# Patient Record
Sex: FEMALE | Race: WHITE | Employment: FULL TIME | URBAN - METROPOLITAN AREA
[De-identification: names, ages, dates, MRNs, and addresses within clinical notes are randomized per-mention and may not be internally consistent; named-entity substitution may affect disease eponyms.]

---

## 2017-02-03 ENCOUNTER — HOSPITAL ENCOUNTER (OUTPATIENT)
Dept: MRI IMAGING | Age: 47
Discharge: HOME | End: 2017-02-03
Payer: COMMERCIAL

## 2017-02-03 DIAGNOSIS — G89.4 CHRONIC PAIN SYNDROME: ICD-10-CM

## 2017-02-03 DIAGNOSIS — G47.01 INSOMNIA SECONDARY TO CHRONIC PAIN: ICD-10-CM

## 2017-02-03 DIAGNOSIS — G89.29 CHRONIC PAIN OF LEFT KNEE: ICD-10-CM

## 2017-02-03 DIAGNOSIS — M79.18 MYOFASCIAL PAIN DYSFUNCTION SYNDROME: ICD-10-CM

## 2017-02-03 DIAGNOSIS — M54.12 CERVICAL RADICULOPATHY: ICD-10-CM

## 2017-02-03 DIAGNOSIS — M25.50 POLYARTHRALGIA: ICD-10-CM

## 2017-02-03 DIAGNOSIS — M51.36 DDD (DEGENERATIVE DISC DISEASE), LUMBAR: ICD-10-CM

## 2017-02-03 DIAGNOSIS — Z79.899 ENCOUNTER FOR LONG-TERM (CURRENT) USE OF HIGH-RISK MEDICATION: ICD-10-CM

## 2017-02-03 DIAGNOSIS — M54.2 CERVICALGIA: ICD-10-CM

## 2017-02-03 DIAGNOSIS — M25.562 CHRONIC PAIN OF LEFT KNEE: ICD-10-CM

## 2017-02-03 DIAGNOSIS — M50.30 DDD (DEGENERATIVE DISC DISEASE), CERVICAL: ICD-10-CM

## 2017-02-03 DIAGNOSIS — G89.29 INSOMNIA SECONDARY TO CHRONIC PAIN: ICD-10-CM

## 2017-02-03 PROCEDURE — 73221 MRI JOINT UPR EXTREM W/O DYE: CPT

## 2017-02-10 ENCOUNTER — OFFICE VISIT (OUTPATIENT)
Dept: PAIN MANAGEMENT | Age: 47
End: 2017-02-10

## 2017-02-10 VITALS
BODY MASS INDEX: 34.07 KG/M2 | SYSTOLIC BLOOD PRESSURE: 127 MMHG | HEART RATE: 88 BPM | HEIGHT: 69 IN | WEIGHT: 230 LBS | DIASTOLIC BLOOD PRESSURE: 86 MMHG

## 2017-02-10 DIAGNOSIS — M54.12 CERVICAL RADICULOPATHY: ICD-10-CM

## 2017-02-10 DIAGNOSIS — M79.18 MYOFASCIAL PAIN DYSFUNCTION SYNDROME: ICD-10-CM

## 2017-02-10 DIAGNOSIS — G89.29 CHRONIC RIGHT SHOULDER PAIN: ICD-10-CM

## 2017-02-10 DIAGNOSIS — M25.521 RIGHT ELBOW PAIN: ICD-10-CM

## 2017-02-10 DIAGNOSIS — M15.9 GENERALIZED OA: ICD-10-CM

## 2017-02-10 DIAGNOSIS — G89.29 CHRONIC PAIN OF LEFT KNEE: ICD-10-CM

## 2017-02-10 DIAGNOSIS — M25.511 CHRONIC RIGHT SHOULDER PAIN: ICD-10-CM

## 2017-02-10 DIAGNOSIS — F51.04 CHRONIC INSOMNIA: ICD-10-CM

## 2017-02-10 DIAGNOSIS — M50.30 DDD (DEGENERATIVE DISC DISEASE), CERVICAL: ICD-10-CM

## 2017-02-10 DIAGNOSIS — G47.01 INSOMNIA SECONDARY TO CHRONIC PAIN: ICD-10-CM

## 2017-02-10 DIAGNOSIS — M51.36 DDD (DEGENERATIVE DISC DISEASE), LUMBAR: ICD-10-CM

## 2017-02-10 DIAGNOSIS — M25.562 CHRONIC PAIN OF LEFT KNEE: ICD-10-CM

## 2017-02-10 DIAGNOSIS — G89.29 INSOMNIA SECONDARY TO CHRONIC PAIN: ICD-10-CM

## 2017-02-10 DIAGNOSIS — M54.2 CERVICALGIA: ICD-10-CM

## 2017-02-10 DIAGNOSIS — M76.899 ENTHESOPATHY, KNEE: ICD-10-CM

## 2017-02-10 DIAGNOSIS — G89.4 CHRONIC PAIN SYNDROME: Primary | ICD-10-CM

## 2017-02-10 DIAGNOSIS — M25.50 POLYARTHRALGIA: ICD-10-CM

## 2017-02-10 RX ORDER — OXYCODONE AND ACETAMINOPHEN 7.5; 325 MG/1; MG/1
1 TABLET ORAL
Qty: 90 TAB | Refills: 0 | Status: SHIPPED | OUTPATIENT
Start: 2017-02-10 | End: 2017-04-27 | Stop reason: SDUPTHER

## 2017-02-10 NOTE — PROGRESS NOTES
HISTORY OF PRESENT ILLNESS  Pernell Kirby is a 42 y.o. female. HPI  The patient presents today for follow up of chronic  severe pain involving the cervical and lumbar spine related to underlying degenerative disc disease and spondylosis. She also continues to have difficulties with the left knee. She has not met with ortho yet. She has been given a referral by her physical therapist.  She is not sure of the name of the orthopedist.  She does not need a referral for this. She is also having trouble with her right elbow and has been wearing a band which helps some of the time. She plans to discuss this further with ortho also. The patient denies any significant changes since last f/u. She tolerates medications without side effects. Pernell Kirby reports no change in sleep or constipation. She takes trazodone to help her sleep at night. No cpap. She denies constipation. The patient reports 50% relief with current medications. She describes her pain as constant, achy (knees and legs), burning and nagging (in SI Joints). Activity, bending, excessive walking, standing, kneeling and weather aggravates her pain. Rest and medication alleviate her pain. She used to get injections but she moved to [facility]. She is waiting to get more settled before scheduling any injections (was getting cervical adwoa's). She is no longer going to PT for her knee (feeling much better). 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 She has to do a lot of reaching and maneuvering with her right arm/shoulder. The patient reports functional improvement and QOL with pain medication. History or right shoulder surgery (x2). She also had a Sands fracture with surgery (left foot). She also plays acoustic guitar in a band. Right handed. EXAM: MRI SHOULDER RT WO CONT study done 2/3/17     INDICATION: Right shoulder pain and limited range of motion.  Several surgeries  secondary to spur/impingement.     COMPARISON: None     TECHNIQUE: Axial proton density fat-saturated; oblique coronal T1, T2  fat-saturated, and proton density fat-saturated; and oblique sagittal T2  fat-saturated MRI of the right shoulder .     CONTRAST: None.     FINDINGS: A.C. joint: Normal for age. Anterior acromion process type: The  patient is status post prior acromioplasty.     Bone marrow: Small cyst in the posterior greater tuberosity likely related to  entering vasculature. No acute fracture, dislocation, or marrow replacing  process.     Joint fluid: No significant joint effusion. There is a ganglion cyst extending  from the rotator interval on series 5 image 14 which then traverses along the  coracoid process and then extends down into the suprascapular notch and adjacent  to the base of the coracoid process. The largest portion of this measures 2.1 x  1.2 cm on series 5 image 15.      Rotator cuff tendons: There is mildly increased signal in the superior fibers of  the subscapularis related to mild tendinosis. This is best seen on axial series  4 images 14 and 13. This is seen on sagittal series 8 image 10. The cuff is  otherwise intact. Trace fluid is seen in the subacromial and subdeltoid bursa.      Biceps tendon: Intact and located within the bicipital groove.      Muscles: No edema or atrophy.     Glenoid labrum: Intact.      Joint capsule: within normal limits.     Glenohumeral articular cartilage: Intact. No focal osteochondral lesion.     Soft tissue mass: None.     IMPRESSION  IMPRESSION:   1. Mild tendinosis in the superior fibers of the subscapularis. 2. Ganglion cyst extending from the rotator interval adjacent to the coracoid  process. This has a tortuous course adjacent to the coracoid process extending  down towards the suprascapular notch and further medially. The muscles appear  intact without evidence of denervation. 3. Status post acromioplasty. UDS 9/9/16 reviewed and consistent.    Review of Systems   Constitutional: Positive for malaise/fatigue. Gastrointestinal: Positive for constipation, heartburn and nausea. Musculoskeletal: Positive for back pain, joint pain (polyarticular), myalgias and neck pain. Neurological: Positive for weakness (generalized). Psychiatric/Behavioral: The patient has insomnia. All other systems reviewed and are negative. Physical Exam   Constitutional: She appears well-developed and well-nourished. No distress. HENT:   Head: Normocephalic. Right Ear: External ear normal.   Left Ear: External ear normal.   Eyes: Conjunctivae and EOM are normal. Pupils are equal, round, and reactive to light. Pulmonary/Chest: Effort normal. No respiratory distress. Musculoskeletal:        Right shoulder: She exhibits decreased range of motion, tenderness and pain. Left shoulder: She exhibits tenderness and pain. She exhibits normal range of motion. Left knee: She exhibits decreased range of motion. Tenderness found. Cervical back: She exhibits tenderness and pain. Lumbar back: She exhibits decreased range of motion, tenderness and pain. Neurological: Gait (antalgic) abnormal.   Skin: Skin is warm and dry. Psychiatric: She has a normal mood and affect. Her behavior is normal. Judgment and thought content normal.   Nursing note and vitals reviewed. ASSESSMENT and PLAN  Encounter Diagnoses   Name Primary?     Chronic pain syndrome Yes    Chronic right shoulder pain     Right elbow pain     Insomnia secondary to chronic pain     Cervical radiculopathy     Myofascial pain dysfunction syndrome     Cervicalgia     DDD (degenerative disc disease), cervical     DDD (degenerative disc disease), lumbar     Generalized OA     Polyarthralgia     Chronic insomnia     Chronic pain of left knee     Enthesopathy, knee      Orders Placed This Encounter    oxyCODONE-acetaminophen (PERCOCET) 7.5-325 mg per tablet    oxyCODONE-acetaminophen (PERCOCET) 7.5-325 mg per tablet     Patient Instructions   1. Continue medications as prescribed. 2.  Follow up in two months  3. Please see ortho for your right shoulder and elbow    40 minutes spent in visit face to face with the patient.     >50% of time spent counseling and coordinating care

## 2017-02-10 NOTE — MR AVS SNAPSHOT
Visit Information Date & Time Provider Department Dept. Phone Encounter #  
 2/10/2017  1:40 PM [facility] 21  Follow-up Instructions Return in about 2 months (around 4/10/2017). Upcoming Health Maintenance Date Due DTaP/Tdap/Td series (1 - Tdap) 3/17/1993 PAP AKA CERVICAL CYTOLOGY 5/14/2016 INFLUENZA AGE 9 TO ADULT 8/1/2016 Allergies as of 2/10/2017  Review Complete On: 2/10/2017 By: KAIT Gr Severity Noted Reaction Type Reactions Wellbutrin [Bupropion Hcl]  09/09/2016    Other (comments)  
 manic Current Immunizations  Never Reviewed No immunizations on file. Not reviewed this visit You Were Diagnosed With   
  
 Codes Comments Chronic pain syndrome    -  Primary ICD-10-CM: G89.4 ICD-9-CM: 338. 4 Chronic right shoulder pain     ICD-10-CM: M25.511, G89.29 ICD-9-CM: 719.41, 338.29 Right elbow pain     ICD-10-CM: M25.521 ICD-9-CM: 719.42 Insomnia secondary to chronic pain     ICD-10-CM: G89.29, G47.01 
ICD-9-CM: 338.29, 327.01 Cervical radiculopathy     ICD-10-CM: M54.12 
ICD-9-CM: 723.4 Myofascial pain dysfunction syndrome     ICD-10-CM: M79.1 ICD-9-CM: 729.1 Cervicalgia     ICD-10-CM: M54.2 ICD-9-CM: 723.1 DDD (degenerative disc disease), cervical     ICD-10-CM: M50.30 ICD-9-CM: 722.4 DDD (degenerative disc disease), lumbar     ICD-10-CM: M51.36 
ICD-9-CM: 722.52 Generalized OA     ICD-10-CM: M15.9 ICD-9-CM: 715.00 Polyarthralgia     ICD-10-CM: M25.50 ICD-9-CM: 719.49 Chronic insomnia     ICD-10-CM: F51.04 
ICD-9-CM: 780.52 Chronic pain of left knee     ICD-10-CM: M25.562, G89.29 ICD-9-CM: 719.46, 338.29 Enthesopathy, knee     ICD-10-CM: M76.9 ICD-9-CM: 726.60 Vitals BP Pulse Height(growth percentile) Weight(growth percentile) BMI OB Status 127/86 88 5' 9\" (1.753 m) 230 lb (104.3 kg) 33.97 kg/m2 Having regular periods Smoking Status Former Smoker Vitals History BMI and BSA Data Body Mass Index Body Surface Area  
 33.97 kg/m 2 2.25 m 2 Your Updated Medication List  
  
   
This list is accurate as of: 2/10/17  3:06 PM.  Always use your most recent med list.  
  
  
  
  
 EFFEXOR  mg capsule Generic drug:  venlafaxine-SR Take  by mouth daily. NexIUM 40 mg capsule Generic drug:  esomeprazole Take  by mouth daily. * oxyCODONE-acetaminophen 7.5-325 mg per tablet Commonly known as:  PERCOCET Take 1 Tab by mouth three (3) times daily as needed for Pain for up to 30 days. Max Daily Amount: 3 Tabs. Due to fill 02/28/17 for chronic pain  Indications: Pain * oxyCODONE-acetaminophen 7.5-325 mg per tablet Commonly known as:  PERCOCET Take 1 Tab by mouth three (3) times daily as needed for Pain for up to 30 days. Max Daily Amount: 3 Tabs. Due to fill 03/29/17 for chronic pain  Indications: Pain  
  
 topiramate 100 mg tablet Commonly known as:  TOPAMAX Take 50 mg by mouth two (2) times a day. 50 in am and 100 in pm  
  
 traZODone 100 mg tablet Commonly known as:  Debbe Gunner Take 100 mg by mouth nightly. TREXIMET  mg tablet Generic drug:  SUMAtriptan-naproxen Take 1 Tab by mouth once as needed. ZANAFLEX 4 mg tablet Generic drug:  tiZANidine Take 4 mg by mouth every six (6) hours as needed. * Notice: This list has 2 medication(s) that are the same as other medications prescribed for you. Read the directions carefully, and ask your doctor or other care provider to review them with you. Prescriptions Printed Refills  
 oxyCODONE-acetaminophen (PERCOCET) 7.5-325 mg per tablet 0 Sig: Take 1 Tab by mouth three (3) times daily as needed for Pain for up to 30 days. Max Daily Amount: 3 Tabs.  Due to fill 02/28/17 for chronic pain  Indications: Pain Class: Print Route: Oral  
 oxyCODONE-acetaminophen (PERCOCET) 7.5-325 mg per tablet 0 Sig: Take 1 Tab by mouth three (3) times daily as needed for Pain for up to 30 days. Max Daily Amount: 3 Tabs. Due to fill 03/29/17 for chronic pain  Indications: Pain Class: Print Route: Oral  
  
Follow-up Instructions Return in about 2 months (around 4/10/2017). Patient Instructions 1. Continue medications as prescribed. 2.  Follow up in two months 3. Please see ortho for your right shoulder and elbow Introducing [facility]! Misty Brown introduces [employer] patient portal. Now you can access parts of your medical record, email your doctor's office, and request medication refills online. 1. In your internet browser, go to [email or web address] 2. Click on the First Time User? Click Here link in the Sign In box. You will see the New Member Sign Up page. 3. Enter your [employer] Access Code exactly as it appears below. You will not need to use this code after youve completed the sign-up process. If you do not sign up before the expiration date, you must request a new code. · [employer] Access Code: [email or web address] Expires: 3/14/2017  5:25 PM 
 
4. Enter the last four digits of your Social Security Number (xxxx) and Date of Birth (mm/dd/yyyy) as indicated and click Submit. You will be taken to the next sign-up page. 5. Create a [employer]miro ID. This will be your [employer] login ID and cannot be changed, so think of one that is secure and easy to remember. 6. Create a [employer]ro password. You can change your password at any time. 7. Enter your Password Reset Question and Answer. This can be used at a later time if you forget your password. 8. Enter your e-mail address. You will receive e-mail notification when new information is available in [address]. 9. Click Sign Up. You can now view and download portions of your medical record. 10. Click the Download Summary menu link to download a portable copy of your medical information. If you have questions, please visit the Frequently Asked Questions section of the [employer] website. Remember, [employer] is NOT to be used for urgent needs. For medical emergencies, dial 911. Now available from your iPhone and Android! Please provide this summary of care documentation to your next provider. Your primary care clinician is listed as MELA ARMIJO. If you have any questions after today's visit, please call [phone].

## 2017-02-22 ENCOUNTER — TELEPHONE (OUTPATIENT)
Dept: PAIN MANAGEMENT | Age: 47
End: 2017-02-22

## 2017-02-23 NOTE — TELEPHONE ENCOUNTER
Patient updated ; patient advised that this office does not handle post-procedure pain; patient also advised to notify this office within 72 hours of being prescribed post procedure pain medication from surgeon.

## 2017-03-24 ENCOUNTER — TELEPHONE (OUTPATIENT)
Dept: PAIN MANAGEMENT | Age: 47
End: 2017-03-24

## 2017-04-27 ENCOUNTER — OFFICE VISIT (OUTPATIENT)
Dept: PAIN MANAGEMENT | Age: 47
End: 2017-04-27

## 2017-04-27 VITALS
DIASTOLIC BLOOD PRESSURE: 85 MMHG | HEART RATE: 81 BPM | SYSTOLIC BLOOD PRESSURE: 117 MMHG | BODY MASS INDEX: 33.97 KG/M2 | WEIGHT: 230 LBS

## 2017-04-27 DIAGNOSIS — M79.18 MYOFASCIAL PAIN DYSFUNCTION SYNDROME: ICD-10-CM

## 2017-04-27 DIAGNOSIS — M54.2 CERVICALGIA: ICD-10-CM

## 2017-04-27 DIAGNOSIS — M54.12 CERVICAL RADICULOPATHY: ICD-10-CM

## 2017-04-27 DIAGNOSIS — M51.36 DDD (DEGENERATIVE DISC DISEASE), LUMBAR: ICD-10-CM

## 2017-04-27 DIAGNOSIS — M25.562 CHRONIC PAIN OF LEFT KNEE: ICD-10-CM

## 2017-04-27 DIAGNOSIS — M25.50 POLYARTHRALGIA: ICD-10-CM

## 2017-04-27 DIAGNOSIS — M15.9 GENERALIZED OA: ICD-10-CM

## 2017-04-27 DIAGNOSIS — M50.30 DDD (DEGENERATIVE DISC DISEASE), CERVICAL: ICD-10-CM

## 2017-04-27 DIAGNOSIS — G89.4 CHRONIC PAIN SYNDROME: ICD-10-CM

## 2017-04-27 DIAGNOSIS — G89.29 CHRONIC PAIN OF LEFT KNEE: ICD-10-CM

## 2017-04-27 DIAGNOSIS — Z79.899 ENCOUNTER FOR LONG-TERM (CURRENT) USE OF HIGH-RISK MEDICATION: Primary | ICD-10-CM

## 2017-04-27 LAB
ALCOHOL UR POC: NORMAL
AMPHETAMINES UR POC: NEGATIVE
BARBITURATES UR POC: NEGATIVE
BENZODIAZEPINES UR POC: NEGATIVE
BUPRENORPHINE UR POC: NORMAL
CANNABINOIDS UR POC: NEGATIVE
CARISOPRODOL UR POC: NORMAL
COCAINE UR POC: NEGATIVE
FENTANYL UR POC: NORMAL
MDMA/ECSTASY UR POC: NEGATIVE
METHADONE UR POC: NEGATIVE
METHAMPHETAMINE UR POC: NEGATIVE
METHYLPHENIDATE UR POC: NEGATIVE
OPIATES UR POC: NEGATIVE
OXYCODONE UR POC: NORMAL
PHENCYCLIDINE UR POC: NORMAL
PROPOXYPHENE UR POC: NORMAL
TRAMADOL UR POC: NORMAL
TRICYCLICS UR POC: NEGATIVE

## 2017-04-27 RX ORDER — OXYCODONE AND ACETAMINOPHEN 7.5; 325 MG/1; MG/1
1 TABLET ORAL
Qty: 90 TAB | Refills: 0 | Status: SHIPPED | OUTPATIENT
Start: 2017-05-29 | End: 2017-06-23 | Stop reason: SDUPTHER

## 2017-04-27 RX ORDER — OXYCODONE AND ACETAMINOPHEN 7.5; 325 MG/1; MG/1
1 TABLET ORAL
Qty: 90 TAB | Refills: 0 | Status: SHIPPED | OUTPATIENT
Start: 2017-04-30 | End: 2017-05-30

## 2017-04-27 NOTE — PATIENT INSTRUCTIONS
1. Continue current plan with no evidence of addiction or diversion. Stable on current medication without adverse events. 2. Refill oxycodone 7.5/325 mg up to 3 times daily as needed. 3. Continue OTC ibuprofen as needed. With food only. 4. Continue to follow-up with orthopedic surgeon regarding recent shoulder surgery. 5. Please continue to avoid any further self increases in medication. 6. Schedule education class as soon as possible. 7. Discussed risks of addiction, dependency, and opioid induced hyperalgesia.    8. Return to clinic in 2 months

## 2017-04-27 NOTE — PROGRESS NOTES
HISTORY OF PRESENT ILLNESS  Osbaldo Barcenas is a 41 y.o. female    HPI: Ms. Heidy Abdul  returns today for f/u of chronic, severe polyarticular pain involving the cervical and lumbar spine with radiation to the proximal upper extremities. She has left knee pain. PT for her left knee pain with good improvement. No h/o lumbar or neck surgery. Prior cervical and SI joint injections with some improvement. No h/o PT for back or neck. Today is my first visit with Ms. Saavedra. We had a very lengthy conversation about her current condition and medications in detail today. She recently underwent surgery to remove a ganglion cyst in her right surgery last month. She reports good improvement from her surgery but is still sore postsurgically. She says that she was told that she did not have the surgery she could have lost use of her right arm. She also reports some improvement in her left knee pain as well since her physical therapy. She does still complain of her lumbar and neck pain unchanged since last visit. She has been using oxycodone 7.5/325 mg with significant improvement. She does report that postsurgically she only received 5 mg oxycodone which did not cover her pain. She admits to taking a few extra of her oxycodone 7.5 which left her short on her pill count today. Extensive counseling was provided. I explained her that continued behaviors could result in discharge from our practice. She has not attended our education class at this time. I recommended that she schedule for this class as soon as possible. Current medication management consists of Oxycodone 7.5/325 mg TID PRN. Medications are helping with pain control and quality of life. Her pain is 2/10 with medication and 7/10 without. Pt describes pain as constant, aching, stabbing, and burning. Aggravating factors include standing for long periods and looking upward,  Relieved with rest, medication, and avoiding painful activities.  Current treatment is helping to improve general activity, mood, walking, sleep, enjoyment of life    In the past 30 days, the patient reports approximately 60% pain relief with current treatment/medications. She  is otherwise doing well with no other complaints today. She denies any adverse events including nausea, vomiting, dizziness, increased constipation, hallucinations, or seizures. POC UDS today. Confirmation pending. Has not attended education class. Allergies   Allergen Reactions    Wellbutrin [Bupropion Hcl] Other (comments)     manic       Past Surgical History:   Procedure Laterality Date    HX CHOLECYSTECTOMY  2010    HX HEENT      adenoids with tube placemen , age 13   [facility]uss[facility]TH[facility] Right [phone]    arthroscopic shoulder    HX ORTHOPAEDIC Left [id number]    foot repair after break , bone spur removal         Review of Systems   Constitutional: Negative for chills, fever and weight loss. HENT: Negative for congestion and sore throat. Eyes: Negative for blurred vision and double vision. Respiratory: Negative for cough, shortness of breath and wheezing. Cardiovascular: Negative for chest pain and palpitations. Gastrointestinal: Positive for heartburn. Negative for constipation, diarrhea, nausea and vomiting. Esophageal motility disorder   Genitourinary: Negative. Musculoskeletal: Positive for back pain, joint pain and neck pain. Negative for falls and myalgias. Neurological: Negative for dizziness, sensory change, seizures and loss of consciousness. Endo/Heme/Allergies: Does not bruise/bleed easily. Psychiatric/Behavioral: Positive for depression. The patient has insomnia. The patient is not nervous/anxious. Physical Exam   Constitutional: She is oriented to person, place, and time and well-developed, well-nourished, and in no distress. No distress. overweight   HENT:   Head: Normocephalic and atraumatic.    Eyes: EOM are normal.   Pulmonary/Chest: Effort normal.   Musculoskeletal:        Cervical back: She exhibits decreased range of motion, tenderness and pain. Lumbar back: She exhibits pain. She exhibits normal range of motion and no tenderness. Neurological: She is alert and oriented to person, place, and time. Skin: Skin is warm and dry. No rash noted. She is not diaphoretic. No erythema. Psychiatric: Mood, memory, affect and judgment normal.   Nursing note and vitals reviewed. ASSESSMENT:    1. Encounter for long-term (current) use of high-risk medication    2. Myofascial pain dysfunction syndrome    3. Cervicalgia    4. Cervical radiculopathy    5. DDD (degenerative disc disease), cervical    6. DDD (degenerative disc disease), lumbar    7. Polyarthralgia    8. Generalized OA    9. Chronic pain syndrome    10. Chronic pain of left knee         [place] Prescription Monitoring Program was reviewed which does not demonstrate aberrancies and/or inconsistencies with regard to the historical prescribing of controlled medications to this patient by other providers. PLAN / Pt Instructions:  1. Continue current plan with no evidence of addiction or diversion. Stable on current medication without adverse events. 2. Refill oxycodone 7.5/325 mg up to 3 times daily as needed. 3. Continue OTC ibuprofen as needed. With food only. 4. Continue to follow-up with orthopedic surgeon regarding recent shoulder surgery. 5. Please continue to avoid any further self increases in medication. 6. Schedule education class as soon as possible. 7. Discussed risks of addiction, dependency, and opioid induced hyperalgesia. 8. Return to clinic in 2 months    Medications Ordered Today   Medications    oxyCODONE-acetaminophen (PERCOCET) 7.5-325 mg per tablet     Sig: Take 1 Tab by mouth three (3) times daily as needed for Pain for up to 30 days. Max Daily Amount: 3 Tabs.  Indications: Pain     Dispense:  90 Tab     Refill:  0    oxyCODONE-acetaminophen (PERCOCET) 7.5-325 mg per tablet     Sig: Take 1 Tab by mouth three (3) times daily as needed for Pain for up to 30 days. Max Daily Amount: 3 Tabs. Indications: Pain     Dispense:  90 Tab     Refill:  0       Pain medications prescribed with the objective of pain relief and improved physical and psychosocial function in this patient. Spent 25 minutes with patient today reviewing the treatment plan, goals of treatment plan, and limitations of the treatment plan, to include the potential for side effects from medications and procedures. Gavin Valencia, [address] 4/27/2017      Note: Please excuse any typographical errors. Voice recognition software was used for this note and may cause mistakes.

## 2017-04-27 NOTE — MR AVS SNAPSHOT
Visit Information Date & Time Provider Department Dept. Phone Encounter #  
 4/27/2017 10:20 AM KAIT Mata [address] [facility] [phone] [id number] Follow-up Instructions Return in about 2 months (around 6/27/2017). Your Appointments 6/23/2017 10:20 AM  
Follow Up with KAIT Mata [address] [facility] (ALAYNA SCHEDULING) Appt Note: return in 2 months [address] [facility] [address]  
[phone] [address]  
  
    
 6/23/2017  1:00 PM  
Office Visit with [facility] [address] [facility] (ALAYNA SCHEDULING) Appt Note: edu  
 [address] [facility] [address]  
[phone]  [address] [id number] Upcoming Health Maintenance Date Due DTaP/Tdap/Td series (1 - Tdap) 3/17/1993 PAP AKA CERVICAL CYTOLOGY 5/14/2016 INFLUENZA AGE 9 TO ADULT 8/1/2016 Allergies as of 4/27/2017  Review Complete On: 4/27/2017 By: KAIT Mata Severity Noted Reaction Type Reactions Wellbutrin [Bupropion Hcl]  09/09/2016    Other (comments)  
 manic Current Immunizations  Never Reviewed No immunizations on file. Not reviewed this visit You Were Diagnosed With   
  
 Codes Comments Encounter for long-term (current) use of high-risk medication    -  Primary ICD-10-CM: E88.469 ICD-9-CM: V58.69 Myofascial pain dysfunction syndrome     ICD-10-CM: M79.1 ICD-9-CM: 729.1 Cervicalgia     ICD-10-CM: M54.2 ICD-9-CM: 723.1 Cervical radiculopathy     ICD-10-CM: M54.12 
ICD-9-CM: 723.4 DDD (degenerative disc disease), cervical     ICD-10-CM: M50.30 ICD-9-CM: 722.4 DDD (degenerative disc disease), lumbar     ICD-10-CM: M51.36 
ICD-9-CM: 722.52 Polyarthralgia     ICD-10-CM: M25.50 ICD-9-CM: 719.49 Generalized OA     ICD-10-CM: M15.9 ICD-9-CM: 715.00 Chronic pain syndrome     ICD-10-CM: G89.4 ICD-9-CM: 338. 4 Chronic pain of left knee     ICD-10-CM: M25.562, G89.29 ICD-9-CM: 719.46, 338.29 Vitals BP Pulse Weight(growth percentile) LMP BMI OB Status 117/85 81 230 lb (104.3 kg) 04/23/2017 (Approximate) 33.97 kg/m2 Having regular periods Smoking Status Former Smoker Vitals History BMI and BSA Data Body Mass Index Body Surface Area  
 33.97 kg/m 2 2.25 m 2 Your Updated Medication List  
  
   
This list is accurate as of: 4/27/17 11:35 AM.  Always use your most recent med list.  
  
  
  
  
 EFFEXOR  mg capsule Generic drug:  venlafaxine-SR Take  by mouth daily. NexIUM 40 mg capsule Generic drug:  esomeprazole Take  by mouth daily. * oxyCODONE-acetaminophen 7.5-325 mg per tablet Commonly known as:  PERCOCET Take 1 Tab by mouth three (3) times daily as needed for Pain for up to 30 days. Max Daily Amount: 3 Tabs. Indications: Pain Start taking on:  4/30/2017 * oxyCODONE-acetaminophen 7.5-325 mg per tablet Commonly known as:  PERCOCET Take 1 Tab by mouth three (3) times daily as needed for Pain for up to 30 days. Max Daily Amount: 3 Tabs. Indications: Pain Start taking on:  5/29/2017  
  
 topiramate 100 mg tablet Commonly known as:  TOPAMAX Take 50 mg by mouth two (2) times a day. 50 in am and 100 in pm  
  
 traZODone 100 mg tablet Commonly known as:  Bucklin Belling Take 100 mg by mouth nightly. TREXIMET  mg tablet Generic drug:  SUMAtriptan-naproxen Take 1 Tab by mouth once as needed. ZANAFLEX 4 mg tablet Generic drug:  tiZANidine Take 4 mg by mouth every six (6) hours as needed. * Notice: This list has 2 medication(s) that are the same as other medications prescribed for you. Read the directions carefully, and ask your doctor or other care provider to review them with you. Prescriptions Printed Refills  
 oxyCODONE-acetaminophen (PERCOCET) 7.5-325 mg per tablet 0 Starting on: 4/30/2017 Sig: Take 1 Tab by mouth three (3) times daily as needed for Pain for up to 30 days. Max Daily Amount: 3 Tabs. Indications: Pain Class: Print Route: Oral  
 oxyCODONE-acetaminophen (PERCOCET) 7.5-325 mg per tablet 0 Starting on: 5/29/2017 Sig: Take 1 Tab by mouth three (3) times daily as needed for Pain for up to 30 days. Max Daily Amount: 3 Tabs. Indications: Pain Class: Print Route: Oral  
  
We Performed the Following AMB POC DRUG SCREEN () [ [facility]] DRUG SCREEN [[id number] Custom] Follow-up Instructions Return in about 2 months (around 6/27/2017). Patient Instructions 1. Continue current plan with no evidence of addiction or diversion. Stable on current medication without adverse events. 2. Refill oxycodone 7.5/325 mg up to 3 times daily as needed. 3. Continue OTC ibuprofen as needed. With food only. 4. Continue to follow-up with orthopedic surgeon regarding recent shoulder surgery. 5. Please continue to avoid any further self increases in medication. 6. Schedule education class as soon as possible. 7. Discussed risks of addiction, dependency, and opioid induced hyperalgesia. 8. Return to clinic in 2 months Introducing [facility]! [employer] introduces [employer] patient portal. Now you can access parts of your medical record, email your doctor's office, and request medication refills online. 1. In your internet browser, go to [email or web address] 2. Click on the First Time User? Click Here link in the Sign In box. You will see the New Member Sign Up page. 3. Enter your [employer] Access Code exactly as it appears below. You will not need to use this code after youve completed the sign-up process. If you do not sign up before the expiration date, you must request a new code. · [employer] Access Code: [email or web address] Expires: 7/26/2017 11:35 AM 
 
4. Enter the last four digits of your Social Security Number (xxxx) and Date of Birth (mm/dd/yyyy) as indicated and click Submit. You will be taken to the next sign-up page. 5. Create a [employer] ID. This will be your [employer] login ID and cannot be changed, so think of one that is secure and easy to remember. 6. Create a [employer] password. You can change your password at any time. 7. Enter your Password Reset Question and Answer. This can be used at a later time if you forget your password. 8. Enter your e-mail address. You will receive e-mail notification when new information is available in [address] 9. Click Sign Up. You can now view and download portions of your medical record. 10. Click the Download Summary menu link to download a portable copy of your medical information. If you have questions, please visit the Frequently Asked Questions section of the [employer] website. Remember, [employer] is NOT to be used for urgent needs. For medical emergencies, dial 911. Now available from your iPhone and Android! Please provide this summary of care documentation to your next provider. Your primary care clinician is listed as MELA ARMIJO. If you have any questions after today's visit, please call [phone].

## 2017-04-27 NOTE — PROGRESS NOTES
Nursing Notes    Patient presents to the office today in follow-up. Patient rates her pain at 4/10 on the numerical pain scale. Reviewed medications with counts as follows:    Rx Date filled Qty Dispensed Pill Count Last Dose Short     Percocet 7.5/325mg  04/02/17 90 1 This am  Yes ( three days short )                                             Comments:     POC UDS was performed in office today    Any new labs or imaging since last appointment? YES; pre-op labwork and right shoulder x-ray    Have you been to an emergency room (ER) or urgent care clinic since your last visit? NO            Have you been hospitalized since your last visit? NO     If yes, where, when, and reason for visit? Have you seen or consulted any other health care providers outside of the [address] [place] [address]  since your last visit? YES     If yes, where, when, and reason for visit? Orthopedic surgeon       HM deferred to pcp. Patient noted to be short on medications , percocet 7.5/325mg ( three days short)  this office visit; advised to follow dosing schedule [facility]. Patient admits to self increasing due to recent surgery.

## 2017-06-21 ENCOUNTER — DOCUMENTATION ONLY (OUTPATIENT)
Dept: PAIN MANAGEMENT | Age: 47
End: 2017-06-21

## 2017-06-23 ENCOUNTER — DOCUMENTATION ONLY (OUTPATIENT)
Dept: PAIN MANAGEMENT | Age: 47
End: 2017-06-23

## 2017-06-23 ENCOUNTER — OFFICE VISIT (OUTPATIENT)
Dept: PAIN MANAGEMENT | Age: 47
End: 2017-06-23

## 2017-06-23 DIAGNOSIS — Z71.89 COUNSELING AND COORDINATION OF CARE: Primary | ICD-10-CM

## 2017-06-23 RX ORDER — OXYCODONE AND ACETAMINOPHEN 7.5; 325 MG/1; MG/1
1 TABLET ORAL
Qty: 90 TAB | Refills: 0 | Status: SHIPPED | OUTPATIENT
Start: 2017-06-28 | End: 2017-07-17 | Stop reason: SDUPTHER

## 2017-06-23 RX ORDER — OXYCODONE AND ACETAMINOPHEN 7.5; 325 MG/1; MG/1
1 TABLET ORAL
Qty: 90 TAB | Refills: 0 | OUTPATIENT
Start: 2017-06-28 | End: 2017-06-23 | Stop reason: SDUPTHER

## 2017-06-23 NOTE — PROGRESS NOTES
Ms. Ángel Jade attended the Education Class facilitated by Lizette Chery. Objectives of this class are to review practice policies, protocols and the Controlled Substances Consent and Treatment Agreement, discuss \"what if\" scenarios, introduce staff, and provide an opportunity for questions and answers. Ultimately, goals for this class are for Ms. Saavedra to:    · Be educated - Learn as much as possible about her pain and related treatment, our policies and the Controlled Substances Agreement. · Be responsible - Follow the providers advice regarding treatment recommendations, medications, and prescription information. · Be confident - Better manage her pain and return to a more functional lifestyle. At least 45 minutes was spent with the patient in face-to-face contact today.

## 2017-06-23 NOTE — PROGRESS NOTES
came into the office today for an Appt. She was about 15 minutes late, so we told her she had to reschedule. She was a little upset. She was given a new appt. She asked the [facility]  About her medications. She was told she would have to call Triage to leave a message about getting meds until her appt. She started crying.  As she walked out of our office she punched our sliding doors and they came off track

## 2017-06-23 NOTE — PROGRESS NOTES
HISTORY OF PRESENT ILLNESS  Justine Talley is a 41 y.o. female.   HPI    ROS    Physical Exam    ASSESSMENT and PLAN  {ASSESSMENT/PLAN:59370}

## 2017-07-08 ENCOUNTER — HOSPITAL ENCOUNTER (OUTPATIENT)
Dept: MRI IMAGING | Age: 47
Discharge: HOME | End: 2017-07-08
Attending: INTERNAL MEDICINE
Payer: COMMERCIAL

## 2017-07-08 DIAGNOSIS — M54.12 CERVICAL RADICULOPATHY: ICD-10-CM

## 2017-07-08 PROCEDURE — 72141 MRI NECK SPINE W/O DYE: CPT

## 2017-07-17 ENCOUNTER — OFFICE VISIT (OUTPATIENT)
Dept: PAIN MANAGEMENT | Age: 47
End: 2017-07-17

## 2017-07-17 VITALS
DIASTOLIC BLOOD PRESSURE: 86 MMHG | HEART RATE: 80 BPM | WEIGHT: 230 LBS | BODY MASS INDEX: 33.97 KG/M2 | RESPIRATION RATE: 20 BRPM | SYSTOLIC BLOOD PRESSURE: 126 MMHG | TEMPERATURE: 97.2 F

## 2017-07-17 DIAGNOSIS — M50.30 DDD (DEGENERATIVE DISC DISEASE), CERVICAL: ICD-10-CM

## 2017-07-17 DIAGNOSIS — G89.29 CHRONIC MIDLINE LOW BACK PAIN WITHOUT SCIATICA: ICD-10-CM

## 2017-07-17 DIAGNOSIS — M54.2 CERVICALGIA: ICD-10-CM

## 2017-07-17 DIAGNOSIS — M54.50 CHRONIC MIDLINE LOW BACK PAIN WITHOUT SCIATICA: ICD-10-CM

## 2017-07-17 DIAGNOSIS — G89.4 CHRONIC PAIN SYNDROME: ICD-10-CM

## 2017-07-17 DIAGNOSIS — M15.9 GENERALIZED OA: ICD-10-CM

## 2017-07-17 DIAGNOSIS — M25.50 POLYARTHRALGIA: ICD-10-CM

## 2017-07-17 DIAGNOSIS — M51.36 DDD (DEGENERATIVE DISC DISEASE), LUMBAR: ICD-10-CM

## 2017-07-17 DIAGNOSIS — M54.12 CERVICAL RADICULOPATHY: ICD-10-CM

## 2017-07-17 RX ORDER — OXYCODONE AND ACETAMINOPHEN 7.5; 325 MG/1; MG/1
1 TABLET ORAL
Qty: 90 TAB | Refills: 0 | Status: SHIPPED | OUTPATIENT
Start: 2017-07-17 | End: 2017-07-17 | Stop reason: SDUPTHER

## 2017-07-17 RX ORDER — OXYCODONE AND ACETAMINOPHEN 7.5; 325 MG/1; MG/1
1 TABLET ORAL
Qty: 90 TAB | Refills: 0 | Status: SHIPPED | OUTPATIENT
Start: 2017-08-26 | End: 2017-09-14 | Stop reason: SDUPTHER

## 2017-07-17 RX ORDER — OXYCODONE AND ACETAMINOPHEN 7.5; 325 MG/1; MG/1
1 TABLET ORAL
Qty: 90 TAB | Refills: 0 | Status: SHIPPED | OUTPATIENT
Start: 2017-07-27 | End: 2017-09-14 | Stop reason: SDUPTHER

## 2017-07-17 NOTE — PROGRESS NOTES
Nursing Notes    Patient presents to the office today in follow-up. Patient rates her pain at 5/10 on the numerical pain scale. Reviewed medications with counts as follows:    Rx Date filled Qty Dispensed Pill Count Last Dose Short     Percocet 7.5/325mg  06/28/17 90 27 Today  No ( with one day valerie period )                                             Comments:     POC UDS was not performed in office today    Any new labs or imaging since last appointment? YES; labwork and cervical MRI    Have you been to an emergency room (ER) or urgent care clinic since your last visit? NO            Have you been hospitalized since your last visit? NO     If yes, where, when, and reason for visit? Have you seen or consulted any other health care providers outside of the [address] [place] [address]  since your last visit? YES     If yes, where, when, and reason for visit? Anesthesiologist , rheumatology , and new pcp       HM deferred to pcp.

## 2017-07-17 NOTE — PATIENT INSTRUCTIONS
1. Continue current plan with no evidence of addiction or diversion. Stable on current medication without adverse events. 2. Refill oxycodone 7.5/325 mg up to 3 times daily as needed. 3. Continue to follow-up with interventional is regarding possible cervical injections. 4. Continue OTC ibuprofen as needed. With food only. 5. Continue to follow-up with orthopedic surgeon regarding recent shoulder surgery. 6. Discussed risks of addiction, dependency, and opioid induced hyperalgesia.    7. Return to clinic in 2 months

## 2017-07-17 NOTE — PROGRESS NOTES
HISTORY OF PRESENT ILLNESS  Eva Restrepo is a 41 y.o. female    HPI: Ms. Teofilo Hoffman  returns today for f/u of chronic, severe polyarticular pain involving the cervical and lumbar spine with radiation to the proximal upper extremities. She has left knee pain. PT for her left knee pain with good improvement. No h/o lumbar or neck surgery. Prior cervical and SI joint injections with some improvement. No h/o PT for back or neck. Ms. Teofilo Hoffman continues unchanged since last visit. She is recovering well since her recent surgery to remove ganglion cyst in her shoulder. She does still complain of her neck pain. She is currently following up with an interventionalists in [place] who is planning to do cervical injections. MRI was recently ordered which was reviewed and discussed in office today. She is planning to follow-up with her interventionalists soon. We will continue with her current treatment plan which is offering significant pain control. I will have her follow-up in 2 months for further evaluation and recommendation. Current medication management consists of Oxycodone 7.5/325 mg TID PRN. Medications are helping with pain control and quality of life. Her pain is 2/10 with medication and 7/10 without. Pt describes pain as constant, aching, stabbing, and burning. Aggravating factors include standing for long periods and looking upward,  Relieved with rest, medication, and avoiding painful activities. Current treatment is helping to improve general activity, mood, walking, sleep, enjoyment of life    In the past 30 days, the patient reports approximately 60% pain relief with current treatment/medications. She  is otherwise doing well with no other complaints today. She denies any adverse events including nausea, vomiting, dizziness, increased constipation, hallucinations, or seizures. education class on 6/23/17.        Allergies   Allergen Reactions    Wellbutrin [Bupropion Hcl] Other (comments) manic       Past Surgical History:   Procedure Laterality Date    HX CHOLECYSTECTOMY  2010    HX HEENT      adenoids with tube placemen , age 13   [facility] Right [phone]    arthroscopic shoulder    HX ORTHOPAEDIC Left [id number]    foot repair after break , bone spur removal         Review of Systems   Constitutional: Negative for chills, fever and weight loss. HENT: Negative for congestion and sore throat. Eyes: Negative for blurred vision and double vision. Respiratory: Negative for cough, shortness of breath and wheezing. Cardiovascular: Negative for chest pain and palpitations. Gastrointestinal: Positive for heartburn. Negative for constipation, diarrhea, nausea and vomiting. Esophageal motility disorder   Genitourinary: Negative. Musculoskeletal: Positive for back pain, joint pain and neck pain. Negative for falls and myalgias. Neurological: Negative for dizziness, sensory change, seizures and loss of consciousness. Endo/Heme/Allergies: Does not bruise/bleed easily. Psychiatric/Behavioral: Positive for depression. The patient has insomnia. The patient is not nervous/anxious. Physical Exam   Constitutional: She is oriented to person, place, and time and well-developed, well-nourished, and in no distress. No distress. overweight   HENT:   Head: Normocephalic and atraumatic. Eyes: EOM are normal.   Pulmonary/Chest: Effort normal.   Musculoskeletal:        Cervical back: She exhibits decreased range of motion, tenderness and pain. Lumbar back: She exhibits pain. She exhibits normal range of motion and no tenderness. Neurological: She is alert and oriented to person, place, and time. Skin: Skin is warm and dry. No rash noted. She is not diaphoretic. No erythema. Psychiatric: Mood, memory, affect and judgment normal.   Nursing note and vitals reviewed. ASSESSMENT:    1. Cervicalgia    2. Cervical radiculopathy    3.  DDD (degenerative disc disease), cervical    4. DDD (degenerative disc disease), lumbar    5. Chronic midline low back pain without sciatica    6. Polyarthralgia    7. Generalized OA    8. Chronic pain syndrome         [place] Prescription Monitoring Program was reviewed which does not demonstrate aberrancies and/or inconsistencies with regard to the historical prescribing of controlled medications to this patient by other providers. PLAN / Pt Instructions:  1. Continue current plan with no evidence of addiction or diversion. Stable on current medication without adverse events. 2. Refill oxycodone 7.5/325 mg up to 3 times daily as needed. 3. Continue OTC ibuprofen as needed. With food only. 4. Continue to follow-up with orthopedic surgeon regarding recent shoulder surgery. 5. Discussed risks of addiction, dependency, and opioid induced hyperalgesia. 6. Return to clinic in 2 months       Medications Ordered Today   Medications    DISCONTD: oxyCODONE-acetaminophen (PERCOCET) 7.5-325 mg per tablet     Sig: Take 1 Tab by mouth three (3) times daily as needed for Pain for up to 30 days. Max Daily Amount: 3 Tabs. Indications: Pain     Dispense:  90 Tab     Refill:  0    DISCONTD: oxyCODONE-acetaminophen (PERCOCET) 7.5-325 mg per tablet     Sig: Take 1 Tab by mouth three (3) times daily as needed for Pain for up to 30 days. Max Daily Amount: 3 Tabs. Indications: Pain     Dispense:  90 Tab     Refill:  0    oxyCODONE-acetaminophen (PERCOCET) 7.5-325 mg per tablet     Sig: Take 1 Tab by mouth three (3) times daily as needed for Pain for up to 30 days. Max Daily Amount: 3 Tabs. Indications: Pain     Dispense:  90 Tab     Refill:  0    oxyCODONE-acetaminophen (PERCOCET) 7.5-325 mg per tablet     Sig: Take 1 Tab by mouth three (3) times daily as needed for Pain for up to 30 days. Max Daily Amount: 3 Tabs.  Indications: Pain     Dispense:  90 Tab     Refill:  0       Pain medications prescribed with the objective of pain relief and improved physical and psychosocial function in this patient. Spent 25 minutes with patient today reviewing the treatment plan, goals of treatment plan, and limitations of the treatment plan, to include the potential for side effects from medications and procedures. [place], [place] 7/17/2017      Note: Please excuse any typographical errors. Voice recognition software was used for this note and may cause mistakes.

## 2017-07-17 NOTE — MR AVS SNAPSHOT
Visit Information Date & Time Provider Department Dept. Phone Encounter #  
 7/17/2017  1:40 PM Natalie Uribe, [address] [place] [address] Pain Management [phone] [id number] Follow-up Instructions Return in about 2 months (around 9/17/2017). Upcoming Health Maintenance Date Due DTaP/Tdap/Td series (1 - Tdap) 3/17/1993 PAP AKA CERVICAL CYTOLOGY 5/14/2016 INFLUENZA AGE 9 TO ADULT 8/1/2017 Allergies as of 7/17/2017  Review Complete On: 7/17/2017 By: KAIT Conway Severity Noted Reaction Type Reactions Wellbutrin [Bupropion Hcl]  09/09/2016    Other (comments)  
 manic Current Immunizations  Never Reviewed No immunizations on file. Not reviewed this visit You Were Diagnosed With   
  
 Codes Comments Cervicalgia     ICD-10-CM: M54.2 ICD-9-CM: 723.1 Cervical radiculopathy     ICD-10-CM: M54.12 
ICD-9-CM: 723.4 DDD (degenerative disc disease), cervical     ICD-10-CM: M50.30 ICD-9-CM: 722.4 DDD (degenerative disc disease), lumbar     ICD-10-CM: M51.36 
ICD-9-CM: 722.52 Chronic midline low back pain without sciatica     ICD-10-CM: M54.5, G89.29 ICD-9-CM: 724.2, 338.29 Polyarthralgia     ICD-10-CM: M25.50 ICD-9-CM: 719.49 Generalized OA     ICD-10-CM: M15.9 ICD-9-CM: 715.00 Chronic pain syndrome     ICD-10-CM: G89.4 ICD-9-CM: 338. 4 Vitals BP Pulse Temp Resp Weight(growth percentile) BMI  
 126/86 (BP 1 Location: Left arm, BP Patient Position: Sitting) 80 97.2 °F (36.2 °C) (Oral) 20 230 lb (104.3 kg) 33.97 kg/m2 OB Status Smoking Status Having regular periods Former Smoker Vitals History BMI and BSA Data Body Mass Index Body Surface Area  
 33.97 kg/m 2 2.25 m 2 Your Updated Medication List  
  
   
This list is accurate as of: 7/17/17  3:11 PM.  Always use your most recent med list.  
  
  
  
  
 EFFEXOR  mg capsule Generic drug:  venlafaxine-SR Take  by mouth daily. NexIUM 40 mg capsule Generic drug:  esomeprazole Take  by mouth daily. * oxyCODONE-acetaminophen 7.5-325 mg per tablet Commonly known as:  PERCOCET Take 1 Tab by mouth three (3) times daily as needed for Pain for up to 30 days. Max Daily Amount: 3 Tabs. Indications: Pain Start taking on:  7/27/2017 * oxyCODONE-acetaminophen 7.5-325 mg per tablet Commonly known as:  PERCOCET Take 1 Tab by mouth three (3) times daily as needed for Pain for up to 30 days. Max Daily Amount: 3 Tabs. Indications: Pain Start taking on:  8/26/2017  
  
 topiramate 100 mg tablet Commonly known as:  TOPAMAX Take 50 mg by mouth two (2) times a day. 50 in am and 100 in pm  
  
 traZODone 100 mg tablet Commonly known as:  Illene Dus Take 100 mg by mouth nightly. TREXIMET  mg tablet Generic drug:  SUMAtriptan-naproxen Take 1 Tab by mouth once as needed. ZANAFLEX 4 mg tablet Generic drug:  tiZANidine Take 4 mg by mouth every six (6) hours as needed. * Notice: This list has 2 medication(s) that are the same as other medications prescribed for you. Read the directions carefully, and ask your doctor or other care provider to review them with you. Prescriptions Printed Refills  
 oxyCODONE-acetaminophen (PERCOCET) 7.5-325 mg per tablet 0 Starting on: 7/27/2017 Sig: Take 1 Tab by mouth three (3) times daily as needed for Pain for up to 30 days. Max Daily Amount: 3 Tabs. Indications: Pain Class: Print Route: Oral  
 oxyCODONE-acetaminophen (PERCOCET) 7.5-325 mg per tablet 0 Starting on: 8/26/2017 Sig: Take 1 Tab by mouth three (3) times daily as needed for Pain for up to 30 days. Max Daily Amount: 3 Tabs. Indications: Pain Class: Print Route: Oral  
  
Follow-up Instructions Return in about 2 months (around 9/17/2017). Patient Instructions 1. Continue current plan with no evidence of addiction or diversion. Stable on current medication without adverse events. 2. Refill oxycodone 7.5/325 mg up to 3 times daily as needed. 3. Continue to follow-up with interventional is regarding possible cervical injections. 4. Continue OTC ibuprofen as needed. With food only. 5. Continue to follow-up with orthopedic surgeon regarding recent shoulder surgery. 6. Discussed risks of addiction, dependency, and opioid induced hyperalgesia. 7. Return to clinic in 2 months Introducing [facility]! Dear Robb Storm: Thank you for requesting a [employer] account. Our records indicate that you already have an active [employer] account. You can access your account anytime at [email or web address] Did you know that you can access your hospital and ER discharge instructions at any time in [employer]? You can also review all of your test results from your hospital stay or ER visit. Additional Information If you have questions, please visit the Frequently Asked Questions section of the [employer] website at [email or web address] Remember, [employer] is NOT to be used for urgent needs. For medical emergencies, dial 911. Now available from your iPhone and Android! Please provide this summary of care documentation to your next provider. Your primary care clinician is listed as Guanaco Moody. If you have any questions after today's visit, please call [phone].

## 2017-07-18 ENCOUNTER — DOCUMENTATION ONLY (OUTPATIENT)
Dept: PAIN MANAGEMENT | Age: 47
End: 2017-07-18

## 2017-09-14 ENCOUNTER — OFFICE VISIT (OUTPATIENT)
Dept: PAIN MANAGEMENT | Age: 47
End: 2017-09-14

## 2017-09-14 VITALS
HEART RATE: 86 BPM | RESPIRATION RATE: 12 BRPM | HEIGHT: 69 IN | SYSTOLIC BLOOD PRESSURE: 154 MMHG | DIASTOLIC BLOOD PRESSURE: 97 MMHG | BODY MASS INDEX: 34.07 KG/M2 | WEIGHT: 230 LBS | TEMPERATURE: 98.2 F

## 2017-09-14 DIAGNOSIS — M54.12 CERVICAL RADICULOPATHY: ICD-10-CM

## 2017-09-14 DIAGNOSIS — M50.30 DDD (DEGENERATIVE DISC DISEASE), CERVICAL: ICD-10-CM

## 2017-09-14 DIAGNOSIS — M15.9 GENERALIZED OA: ICD-10-CM

## 2017-09-14 DIAGNOSIS — M54.50 CHRONIC MIDLINE LOW BACK PAIN WITHOUT SCIATICA: ICD-10-CM

## 2017-09-14 DIAGNOSIS — M25.50 POLYARTHRALGIA: ICD-10-CM

## 2017-09-14 DIAGNOSIS — G89.4 CHRONIC PAIN SYNDROME: ICD-10-CM

## 2017-09-14 DIAGNOSIS — M54.2 CERVICALGIA: Primary | ICD-10-CM

## 2017-09-14 DIAGNOSIS — Z79.899 ENCOUNTER FOR LONG-TERM (CURRENT) USE OF HIGH-RISK MEDICATION: ICD-10-CM

## 2017-09-14 DIAGNOSIS — G89.29 CHRONIC MIDLINE LOW BACK PAIN WITHOUT SCIATICA: ICD-10-CM

## 2017-09-14 LAB
ALCOHOL UR POC: NORMAL
AMPHETAMINES UR POC: NORMAL
BARBITURATES UR POC: NORMAL
BENZODIAZEPINES UR POC: NORMAL
BUPRENORPHINE UR POC: NORMAL
CANNABINOIDS UR POC: NORMAL
CARISOPRODOL UR POC: NORMAL
COCAINE UR POC: NORMAL
FENTANYL UR POC: NORMAL
MDMA/ECSTASY UR POC: NORMAL
METHADONE UR POC: NORMAL
METHAMPHETAMINE UR POC: NORMAL
METHYLPHENIDATE UR POC: NORMAL
OPIATES UR POC: NORMAL
OXYCODONE UR POC: NORMAL
PHENCYCLIDINE UR POC: NORMAL
PROPOXYPHENE UR POC: NORMAL
TRAMADOL UR POC: NORMAL
TRICYCLICS UR POC: NORMAL

## 2017-09-14 RX ORDER — OXYCODONE AND ACETAMINOPHEN 7.5; 325 MG/1; MG/1
1 TABLET ORAL
Qty: 90 TAB | Refills: 0 | Status: SHIPPED | OUTPATIENT
Start: 2017-09-25 | End: 2017-12-07 | Stop reason: SDUPTHER

## 2017-09-14 RX ORDER — OXYCODONE AND ACETAMINOPHEN 7.5; 325 MG/1; MG/1
1 TABLET ORAL
Qty: 90 TAB | Refills: 0 | Status: SHIPPED | OUTPATIENT
Start: 2017-10-24 | End: 2017-12-07 | Stop reason: SDUPTHER

## 2017-09-14 RX ORDER — OXYCODONE AND ACETAMINOPHEN 7.5; 325 MG/1; MG/1
1 TABLET ORAL
Qty: 90 TAB | Refills: 0 | Status: SHIPPED | OUTPATIENT
Start: 2017-11-23 | End: 2017-12-07 | Stop reason: SDUPTHER

## 2017-09-14 NOTE — PATIENT INSTRUCTIONS
1. Continue current plan with no evidence of addiction or diversion. Stable on current medication without adverse events. 2. Refill oxycodone 7.5/325 mg up to 3 times daily as needed. 3. Continue OTC ibuprofen as needed. With food only. 4. Continue to follow-up with orthopedic surgeon regarding recent shoulder surgery. 5. Discussed risks of addiction, dependency, and opioid induced hyperalgesia.    6. Return to clinic in 3 months

## 2017-09-14 NOTE — MR AVS SNAPSHOT
Visit Information Date & Time Provider Department Dept. Phone Encounter #  
 9/14/2017  1:20 PM Alex Talavera, [facility] [phone] Follow-up Instructions Return in about 3 months (around 12/14/2017). Upcoming Health Maintenance Date Due DTaP/Tdap/Td series (1 - Tdap) 3/17/1993 PAP AKA CERVICAL CYTOLOGY 5/14/2016 INFLUENZA AGE 9 TO ADULT 8/1/2017 Allergies as of 9/14/2017  Review Complete On: 9/14/2017 By: Ibeth Marquez Severity Noted Reaction Type Reactions Wellbutrin [Bupropion Hcl]  09/09/2016    Other (comments)  
 manic Current Immunizations  Never Reviewed No immunizations on file. Not reviewed this visit You Were Diagnosed With   
  
 Codes Comments Cervicalgia    -  Primary ICD-10-CM: M54.2 ICD-9-CM: 723.1 Cervical radiculopathy     ICD-10-CM: M54.12 
ICD-9-CM: 723.4 Chronic midline low back pain without sciatica     ICD-10-CM: M54.5, G89.29 ICD-9-CM: 724.2, 338.29 Chronic pain syndrome     ICD-10-CM: G89.4 ICD-9-CM: 338.4 DDD (degenerative disc disease), cervical     ICD-10-CM: M50.30 ICD-9-CM: 722.4 Polyarthralgia     ICD-10-CM: M25.50 ICD-9-CM: 719.49 Generalized OA     ICD-10-CM: M15.9 ICD-9-CM: 715.00 Vitals BP Pulse Temp Resp Height(growth percentile) Weight(growth percentile) (!) 154/97 86 98.2 °F (36.8 °C) 12 5' 9\" (1.753 m) 230 lb (104.3 kg) LMP BMI OB Status Smoking Status 09/08/2017 33.97 kg/m2 Having regular periods Former Smoker BMI and BSA Data Body Mass Index Body Surface Area  
 33.97 kg/m 2 2.25 m 2 Your Updated Medication List  
  
   
This list is accurate as of: 9/14/17  1:40 PM.  Always use your most recent med list.  
  
  
  
  
 EFFEXOR  mg capsule Generic drug:  venlafaxine-SR Take  by mouth daily. NexIUM 40 mg capsule Generic drug:  esomeprazole Take  by mouth daily. * oxyCODONE-acetaminophen 7.5-325 mg per tablet Commonly known as:  PERCOCET Take 1 Tab by mouth three (3) times daily as needed for Pain for up to 30 days. Max Daily Amount: 3 Tabs. Indications: Pain Start taking on:  9/25/2017 * oxyCODONE-acetaminophen 7.5-325 mg per tablet Commonly known as:  PERCOCET Take 1 Tab by mouth three (3) times daily as needed for Pain for up to 30 days. Max Daily Amount: 3 Tabs. Indications: Pain Start taking on:  10/24/2017 * oxyCODONE-acetaminophen 7.5-325 mg per tablet Commonly known as:  PERCOCET Take 1 Tab by mouth three (3) times daily as needed for Pain for up to 30 days. Max Daily Amount: 3 Tabs. Indications: Pain Start taking on:  11/23/2017  
  
 topiramate 100 mg tablet Commonly known as:  TOPAMAX Take 50 mg by mouth two (2) times a day. 50 in am and 100 in pm  
  
 traZODone 100 mg tablet Commonly known as:  Jaja Geneva Take 100 mg by mouth nightly. TREXIMET  mg tablet Generic drug:  SUMAtriptan-naproxen Take 1 Tab by mouth once as needed. ZANAFLEX 4 mg tablet Generic drug:  tiZANidine Take 4 mg by mouth every six (6) hours as needed. * Notice: This list has 3 medication(s) that are the same as other medications prescribed for you. Read the directions carefully, and ask your doctor or other care provider to review them with you. Prescriptions Printed Refills  
 oxyCODONE-acetaminophen (PERCOCET) 7.5-325 mg per tablet 0 Starting on: 9/25/2017 Sig: Take 1 Tab by mouth three (3) times daily as needed for Pain for up to 30 days. Max Daily Amount: 3 Tabs. Indications: Pain Class: Print Route: Oral  
 oxyCODONE-acetaminophen (PERCOCET) 7.5-325 mg per tablet 0 Starting on: 10/24/2017 Sig: Take 1 Tab by mouth three (3) times daily as needed for Pain for up to 30 days. Max Daily Amount: 3 Tabs. Indications: Pain Class: Print  Route: Oral  
 oxyCODONE-acetaminophen (PERCOCET) 7.5-325 mg per tablet 0 Starting on: 11/23/2017 Sig: Take 1 Tab by mouth three (3) times daily as needed for Pain for up to 30 days. Max Daily Amount: 3 Tabs. Indications: Pain Class: Print Route: Oral  
  
Follow-up Instructions Return in about 3 months (around 12/14/2017). Patient Instructions 1. Continue current plan with no evidence of addiction or diversion. Stable on current medication without adverse events. 2. Refill oxycodone 7.5/325 mg up to 3 times daily as needed. 3. Continue OTC ibuprofen as needed. With food only. 4. Continue to follow-up with orthopedic surgeon regarding recent shoulder surgery. 5. Discussed risks of addiction, dependency, and opioid induced hyperalgesia. 6. Return to clinic in 3 months Introducing [facility]! Dear Cristo Choudhury: Thank you for requesting a [employer] account. Our records indicate that you already have an active [employer] account. You can access your account anytime at [email or web address] Did you know that you can access your hospital and ER discharge instructions at any time in [employer]? You can also review all of your test results from your hospital stay or ER visit. Additional Information If you have questions, please visit the Frequently Asked Questions section of the [employer] website at [email or web address] Remember, [employer] is NOT to be used for urgent needs. For medical emergencies, dial 911. Now available from your iPhone and Android! Please provide this summary of care documentation to your next provider. Your primary care clinician is listed as Raissa Arredondo. If you have any questions after today's visit, please call [phone].

## 2017-09-14 NOTE — PROGRESS NOTES
Nursing Notes    Patient presents to the office today in follow-up. Patient rates her pain at 3/10 on the numerical pain scale. Reviewed medications with counts as follows:    Rx Date filled Qty Dispensed Pill Count Last Dose Short   oxycodonr 7.5-325mg 08/26/17 90 31 today no                                          Comments:     POC UDS was performed in office today    Any new labs or imaging since last appointment? YES,x ray teeth    Have you been to an emergency room (ER) or urgent care clinic since your last visit? NO            Have you been hospitalized since your last visit? NO     If yes, where, when, and reason for visit? Have you seen or consulted any other health care providers outside of the [address] [place] [address]  since your last visit? YES     If yes, where, when, and reason for visit? HM deferred to pcp.

## 2017-09-14 NOTE — PROGRESS NOTES
HISTORY OF PRESENT ILLNESS  Andreia Oseguera is a 41 y.o. female    HPI: Ms. Yamini Umanzor  returns today for f/u of chronic, severe polyarticular pain involving the cervical and lumbar spine with radiation to the proximal upper extremities. She has left knee pain. PT for her left knee pain with good improvement. No h/o lumbar or neck surgery. Prior cervical and SI joint injections with some improvement. No h/o PT for back or neck. Ms. Yamini Umanzor continues unchanged since last visit. She is doing very well with her current treatment plan which has been offering significant pain control. She reports no new complaints today. She continues to follow-up with interventionalist closer to her home in [place]. She is planning to undergo cervical injections soon. We will continue with her current treatment plan no changes today. I will have her follow-up in 3 months for further evaluation and recommendation. Current medication management consists of Oxycodone 7.5/325 mg TID PRN. Medications are helping with pain control and quality of life. Her pain is 2/10 with medication and 7/10 without. Pt describes pain as constant, aching, stabbing, and burning. Aggravating factors include standing for long periods and looking upward,  Relieved with rest, medication, and avoiding painful activities. Current treatment is helping to improve general activity, mood, walking, sleep, enjoyment of life    In the past 30 days, the patient reports approximately 60% pain relief with current treatment/medications. She  is otherwise doing well with no other complaints today. She denies any adverse events including nausea, vomiting, dizziness, increased constipation, hallucinations, or seizures. education class on 6/23/17    POC UDS today. Confirmation pending.        Allergies   Allergen Reactions    Wellbutrin [Bupropion Hcl] Other (comments)     manic       Past Surgical History:   Procedure Laterality Date    HX CHOLECYSTECTOMY 2010    HX HEENT      adenoids with tube placemen , age 13   [facility] ORTHOP[facility]IC Right [id number],3041    arthroscopic shoulder    HX ORTHOPAEDIC Left [phone]    foot repair after break , bone spur removal         Review of Systems   Constitutional: Negative for chills, fever and weight loss. HENT: Negative for congestion and sore throat. Eyes: Negative for blurred vision and double vision. Respiratory: Negative for cough, shortness of breath and wheezing. Cardiovascular: Negative for chest pain and palpitations. Gastrointestinal: Positive for heartburn. Negative for constipation, diarrhea, nausea and vomiting. Esophageal motility disorder   Genitourinary: Negative. Musculoskeletal: Positive for back pain, joint pain and neck pain. Negative for falls and myalgias. Neurological: Negative for dizziness, sensory change, seizures and loss of consciousness. Endo/Heme/Allergies: Does not bruise/bleed easily. Psychiatric/Behavioral: Positive for depression. The patient has insomnia. The patient is not nervous/anxious. Physical Exam   Constitutional: She is oriented to person, place, and time and well-developed, well-nourished, and in no distress. No distress. overweight   HENT:   Head: Normocephalic and atraumatic. Eyes: EOM are normal.   Pulmonary/Chest: Effort normal.   Musculoskeletal:        Cervical back: She exhibits decreased range of motion, tenderness and pain. Lumbar back: She exhibits pain. She exhibits normal range of motion and no tenderness. Neurological: She is alert and oriented to person, place, and time. Skin: Skin is warm and dry. No rash noted. She is not diaphoretic. No erythema. Psychiatric: Mood, memory, affect and judgment normal.   Nursing note and vitals reviewed. ASSESSMENT:    1. Cervicalgia    2. Cervical radiculopathy    3. Chronic midline low back pain without sciatica    4. Chronic pain syndrome    5.  DDD (degenerative disc disease), cervical    6. Polyarthralgia    7. Generalized OA         [place] Prescription Monitoring Program was reviewed which does not demonstrate aberrancies and/or inconsistencies with regard to the historical prescribing of controlled medications to this patient by other providers. PLAN / Pt Instructions:  1. Continue current plan with no evidence of addiction or diversion. Stable on current medication without adverse events. 2. Refill oxycodone 7.5/325 mg up to 3 times daily as needed. 3. Continue OTC ibuprofen as needed. With food only. 4. Continue to follow-up with orthopedic surgeon regarding recent shoulder surgery. 5. Discussed risks of addiction, dependency, and opioid induced hyperalgesia. 6. Return to clinic in 3 months       Medications Ordered Today   Medications    oxyCODONE-acetaminophen (PERCOCET) 7.5-325 mg per tablet     Sig: Take 1 Tab by mouth three (3) times daily as needed for Pain for up to 30 days. Max Daily Amount: 3 Tabs. Indications: Pain     Dispense:  90 Tab     Refill:  0    oxyCODONE-acetaminophen (PERCOCET) 7.5-325 mg per tablet     Sig: Take 1 Tab by mouth three (3) times daily as needed for Pain for up to 30 days. Max Daily Amount: 3 Tabs. Indications: Pain     Dispense:  90 Tab     Refill:  0    oxyCODONE-acetaminophen (PERCOCET) 7.5-325 mg per tablet     Sig: Take 1 Tab by mouth three (3) times daily as needed for Pain for up to 30 days. Max Daily Amount: 3 Tabs. Indications: Pain     Dispense:  90 Tab     Refill:  0       Pain medications prescribed with the objective of pain relief and improved physical and psychosocial function in this patient. Spent 25 minutes with patient today reviewing the treatment plan, goals of treatment plan, and limitations of the treatment plan, to include the potential for side effects from medications and procedures. [address] 9/14/2017      Note: Please excuse any typographical errors.  Voice recognition software was used for this note and may cause mistakes.

## 2017-12-07 ENCOUNTER — OFFICE VISIT (OUTPATIENT)
Dept: PAIN MANAGEMENT | Age: 47
End: 2017-12-07

## 2017-12-07 VITALS
RESPIRATION RATE: 14 BRPM | DIASTOLIC BLOOD PRESSURE: 79 MMHG | BODY MASS INDEX: 34.07 KG/M2 | HEIGHT: 69 IN | WEIGHT: 230 LBS | TEMPERATURE: 97.4 F | SYSTOLIC BLOOD PRESSURE: 124 MMHG | HEART RATE: 93 BPM

## 2017-12-07 DIAGNOSIS — M54.2 CERVICALGIA: ICD-10-CM

## 2017-12-07 DIAGNOSIS — M25.50 POLYARTHRALGIA: ICD-10-CM

## 2017-12-07 DIAGNOSIS — M79.18 MYOFASCIAL PAIN DYSFUNCTION SYNDROME: ICD-10-CM

## 2017-12-07 DIAGNOSIS — M51.36 DDD (DEGENERATIVE DISC DISEASE), LUMBAR: ICD-10-CM

## 2017-12-07 DIAGNOSIS — M54.50 CHRONIC MIDLINE LOW BACK PAIN WITHOUT SCIATICA: ICD-10-CM

## 2017-12-07 DIAGNOSIS — G89.29 CHRONIC MIDLINE LOW BACK PAIN WITHOUT SCIATICA: ICD-10-CM

## 2017-12-07 DIAGNOSIS — M50.30 DDD (DEGENERATIVE DISC DISEASE), CERVICAL: ICD-10-CM

## 2017-12-07 DIAGNOSIS — M54.12 CERVICAL RADICULOPATHY: ICD-10-CM

## 2017-12-07 RX ORDER — IBUPROFEN 200 MG
800 TABLET ORAL
COMMUNITY
End: 2019-07-22

## 2017-12-07 RX ORDER — OXYCODONE AND ACETAMINOPHEN 7.5; 325 MG/1; MG/1
1 TABLET ORAL
Qty: 90 TAB | Refills: 0 | Status: SHIPPED | OUTPATIENT
Start: 2018-01-21 | End: 2018-03-12 | Stop reason: SDUPTHER

## 2017-12-07 RX ORDER — OXYCODONE AND ACETAMINOPHEN 7.5; 325 MG/1; MG/1
1 TABLET ORAL
Qty: 90 TAB | Refills: 0 | Status: SHIPPED | OUTPATIENT
Start: 2018-02-20 | End: 2018-03-12 | Stop reason: SDUPTHER

## 2017-12-07 RX ORDER — NALOXONE HYDROCHLORIDE 4 MG/.1ML
1 SPRAY NASAL AS NEEDED
Qty: 1 EACH | Refills: 1 | Status: SHIPPED | OUTPATIENT
Start: 2017-12-07 | End: 2019-07-22

## 2017-12-07 RX ORDER — MORPHINE SULFATE 15 MG/1
15 TABLET, FILM COATED, EXTENDED RELEASE ORAL EVERY 12 HOURS
Qty: 60 TAB | Refills: 0 | Status: SHIPPED | OUTPATIENT
Start: 2018-02-05 | End: 2018-03-12 | Stop reason: SDUPTHER

## 2017-12-07 RX ORDER — MORPHINE SULFATE 15 MG/1
15 TABLET, FILM COATED, EXTENDED RELEASE ORAL EVERY 12 HOURS
Qty: 60 TAB | Refills: 0 | Status: SHIPPED | OUTPATIENT
Start: 2018-01-06 | End: 2018-03-12 | Stop reason: SDUPTHER

## 2017-12-07 RX ORDER — OXYCODONE AND ACETAMINOPHEN 7.5; 325 MG/1; MG/1
1 TABLET ORAL
Qty: 90 TAB | Refills: 0 | Status: SHIPPED | OUTPATIENT
Start: 2017-12-22 | End: 2018-01-21

## 2017-12-07 RX ORDER — MORPHINE SULFATE 15 MG/1
15 TABLET, FILM COATED, EXTENDED RELEASE ORAL EVERY 12 HOURS
Qty: 60 TAB | Refills: 0 | Status: SHIPPED | OUTPATIENT
Start: 2017-12-07 | End: 2018-03-12 | Stop reason: SDUPTHER

## 2017-12-07 NOTE — PATIENT INSTRUCTIONS
1. Continue current plan with no evidence of addiction or diversion. Stable on current medication without adverse events. 2. Refill oxycodone 7.5/325 mg up to 3 times daily as needed. 3. Add morphine ER 15 mg every 12 hours. 4. Continue OTC ibuprofen as needed. With food only. 5. Continue to follow-up with orthopedic surgeon regarding recent shoulder surgery. 6. Add naloxone 4 mg nasal spray for opioid induced respiratory depression emergency only. Extensive counseling was provided regarding this medication. 7. Discussed risks of addiction, dependency, and opioid induced hyperalgesia. 8. Return to clinic in 3 months          Stopping Smokeless Tobacco Use: Care Instructions  Your Care Instructions    Smokeless tobacco comes in many forms, such as snuff and chewing tobacco:  · Snuff is finely ground tobacco sold in cans or pouches. Most of the time, snuff is used by putting a \"pinch\" or \"dip\" between the lower lip or cheek and the gum. · Chewing tobacco is sold as loose leaves, plugs, or twists. It is chewed or placed between the cheek and the gum or teeth. There are plenty of reasons to stop using smokeless tobacco. These products are harmful. They are not risk-free alternatives to smoking. Smokeless tobacco contains nicotine, which is addicting. Though using smokeless tobacco is less harmful than smoking cigarettes, it can cause serious health problems, such as:  · White patches or red sores in your mouth that can turn into mouth cancer involving the lip, tongue, or cheek. · Tooth loss and other dental problems. · Gum disease. Your gums may pull away from your teeth and not grow back. People who use smokeless tobacco crave the nicotine in it. Giving up smokeless tobacco is much harder than simply changing a habit. Your body has to stop craving the nicotine. It is hard to quit, but you can do it.  Many tools are available for people who want to quit using smokeless tobacco. You may find that combining tools works best for you. There are several steps to quitting. First you get ready to quit. Then you get support to help you. After that, you learn new skills and behaviors to quit. For many people, a necessary step is getting and using medicine. Your doctor will help you set up the plan that best meets your needs. You may want to attend a tobacco cessation program. When you choose a program, look for one that has proven success. Ask your doctor for ideas. You will greatly increase your chances of success if you take medicine as well as get counseling or join a cessation program.  Some of the changes you feel when you first quit smokeless tobacco are uncomfortable. Your body will miss the nicotine at first, and you may feel short-tempered and grumpy. You may have trouble sleeping or concentrating. Medicine can help you deal with these symptoms. You may struggle with changing your habits and rituals. The last step is the tricky one: Be prepared for the urge to use smokeless tobacco to continue for a time. This is a lot to deal with, but keep at it. You will feel better. Follow-up care is a key part of your treatment and safety. Be sure to make and go to all appointments, and call your doctor if you are having problems. It's also a good idea to know your test results and keep a list of the medicines you take. How can you care for yourself at home? · Ask your family, friends, and coworkers for support. You have a better chance of quitting if you have help and support. · Join a support group for people who are trying to quit using smokeless tobacco.  · Set a quit date. Pick your date carefully so that it is not right in the middle of a big deadline or stressful time. After you quit, do not use smokeless tobacco even once. Get rid of all spit cups, cans, and pouches after your last use. Clean your house and your clothes so that they do not smell of tobacco.  · Learn how to be a non-user.  Think about ways you can avoid those things that make you reach for tobacco.  ¨ Learn some ways to deal with cravings, like calling a friend or going for a walk. Cravings often pass. ¨ Avoid situations that put you at greatest risk for using smokeless tobacco. For some people, it is hard to spend time with friends without dipping or chewing. For others, they might skip a coffee break with coworkers who smoke or use smokeless tobacco.  ¨ Change your daily routine. Take a different route to work, or eat a meal in a different place. · Cut down on stress. Calm yourself or release tension by doing an activity you enjoy, such as reading a book, taking a hot bath, or gardening. · Talk to your doctor or pharmacist about nicotine replacement therapy. You still get nicotine, but you do not use tobacco. Nicotine replacement products help you slowly reduce the amount of nicotine you need. Many of these products are available over the counter. They include nicotine patches, gum, lozenges, and inhalers. · Ask your doctor about bupropion (Wellbutrin) or varenicline (Chantix), which are prescription medicines. They do not contain nicotine. They help you by reducing withdrawal symptoms, such as stress and anxiety. · Get regular exercise. Having healthy habits will help your body move past its craving for nicotine. · Be prepared to keep trying. Most people are not successful the first few times they try to quit. Do not get mad at yourself if you use tobacco again. Make a list of things you learned, and think about when you want to try again, such as next week, next month, or next year. Where can you learn more? Go to http://jackie-yair.info/. Enter M105 in the search box to learn more about \"Stopping Smokeless Tobacco Use: Care Instructions. \"  Current as of: March 20, 2017  Content Version: 11.4  © 9122-1228 [employer], [employer].  Care instructions adapted under license by [employer] (which disclaims liability or warranty for this information). If you have questions about a medical condition or this instruction, always ask your healthcare professional. [place] [address] any warranty or liability for your use of this information.

## 2017-12-07 NOTE — PROGRESS NOTES
Nursing Notes    Patient presents to the office today in follow-up. Patient rates her pain at 6/10 on the numerical pain scale. Reviewed medications with counts as follows:    Rx Date filled Qty Dispensed Pill Count Last Dose Short   Percocet 7.5/325 mg 11/23/17 90 45 today no                                      POC UDS was not performed in office today    Any new labs or imaging since last appointment? NO    Have you been to an emergency room (ER) or urgent care clinic since your last visit? NO            Have you been hospitalized since your last visit? NO     If yes, where, when, and reason for visit? Have you seen or consulted any other health care providers outside of the [address] [place] [address]  since your last visit? NO     If yes, where, when, and reason for visit? HM deferred to pcp.

## 2017-12-07 NOTE — PROGRESS NOTES
HISTORY OF PRESENT ILLNESS  Rick Way is a 41 y.o. female    HPI: Ms. Leroy Doe  returns today for f/u of chronic, severe polyarticular pain involving the cervical and lumbar spine with radiation to the proximal upper extremities. She has left knee pain. PT for her left knee pain with good improvement. No h/o lumbar or neck surgery. Prior cervical and SI joint injections with some improvement. No h/o PT for back or neck. Ms. Leroy Doe continues unchanged since last visit. She has been doing well with her current treatment plan which has been offering significant pain control but she has noticed that the medication is not lasting long as it used to. She is now receiving about 3-4 hours of  pain relief. I have recommended that we begin transition to long-acting medication. We will start morphine ER 15 mg every 12 hours. She will continue with her oxycodone to use on an as-needed basis only. She is in agreement with this plan. I will have her follow-up in 3 months or sooner if needed. Because the patient's current regimen places him/her at increased risk for possible overdose, a prescription for naloxone nasal spray is being provided. The patient understands that this medication is only to be used in the setting of a possible overdose and that inadvertent use of this medication could precipitate overt withdrawal.    Current medication management consists of Oxycodone 7.5/325 mg TID PRN. Medications are helping with pain control and quality of life. Her pain is 2/10 with medication and 7/10 without. Pt describes pain as constant, aching, stabbing, and burning. Aggravating factors include standing for long periods and looking upward,  Relieved with rest, medication, and avoiding painful activities. Current treatment is helping to improve general activity, mood, walking, sleep, enjoyment of life    In the past 30 days, the patient reports approximately 60% pain relief with current treatment/medications. She  is otherwise doing well with no other complaints today. She denies any adverse events including nausea, vomiting, dizziness, increased constipation, hallucinations, or seizures. education class on 6/23/17    POC UDS today. Confirmation pending. Allergies   Allergen Reactions    Wellbutrin [Bupropion Hcl] Other (comments)     manic       Past Surgical History:   Procedure Laterality Date    HX CHOLECYSTECTOMY  2010    HX HEENT      adenoids with tube placemen , age 13   [facility] Right [id number]    arthroscopic shoulder    HX ORTHOPAEDIC Left [id number]    foot repair after break , bone spur removal         Review of Systems   Constitutional: Negative for chills, fever and weight loss. HENT: Negative for congestion and sore throat. Eyes: Negative for blurred vision and double vision. Respiratory: Negative for cough, shortness of breath and wheezing. Cardiovascular: Negative for chest pain and palpitations. Gastrointestinal: Positive for heartburn. Negative for constipation, diarrhea, nausea and vomiting. Esophageal motility disorder   Genitourinary: Negative. Musculoskeletal: Positive for back pain, joint pain and neck pain. Negative for falls and myalgias. Neurological: Negative for dizziness, sensory change, seizures and loss of consciousness. Endo/Heme/Allergies: Does not bruise/bleed easily. Psychiatric/Behavioral: Positive for depression. The patient has insomnia. The patient is not nervous/anxious. Physical Exam   Constitutional: She is oriented to person, place, and time and well-developed, well-nourished, and in no distress. No distress. overweight   HENT:   Head: Normocephalic and atraumatic. Eyes: EOM are normal.   Pulmonary/Chest: Effort normal.   Musculoskeletal:        Cervical back: She exhibits decreased range of motion, tenderness and pain. Lumbar back: She exhibits pain. She exhibits normal range of motion and no tenderness. Neurological: She is alert and oriented to person, place, and time. Skin: Skin is warm and dry. No rash noted. She is not diaphoretic. No erythema. Psychiatric: Mood, memory, affect and judgment normal.   Nursing note and vitals reviewed. ASSESSMENT:    1. Myofascial pain dysfunction syndrome    2. Cervicalgia    3. Cervical radiculopathy    4. DDD (degenerative disc disease), cervical    5. DDD (degenerative disc disease), lumbar    6. Chronic midline low back pain without sciatica    7. HUMBERTO Jacobo  Monitoring Program was reviewed which does not demonstrate aberrancies and/or inconsistencies with regard to the historical prescribing of controlled medications to this patient by other providers. PLAN / Pt Instructions:  1. Continue current plan with no evidence of addiction or diversion. Stable on current medication without adverse events. 2. Refill oxycodone 7.5/325 mg up to 3 times daily as needed. 3. Add morphine ER 15 mg every 12 hours. 4. Continue OTC ibuprofen as needed. With food only. 5. Continue to follow-up with orthopedic surgeon regarding recent shoulder surgery. 6. Add naloxone 4 mg nasal spray for opioid induced respiratory depression emergency only. Extensive counseling was provided regarding this medication. 7. Discussed risks of addiction, dependency, and opioid induced hyperalgesia. 8. Return to clinic in 3 months       Medications Ordered Today   Medications    oxyCODONE-acetaminophen (PERCOCET) 7.5-325 mg per tablet     Sig: Take 1 Tab by mouth three (3) times daily as needed for Pain for up to 30 days. Max Daily Amount: 3 Tabs. Indications: Pain     Dispense:  90 Tab     Refill:  0    oxyCODONE-acetaminophen (PERCOCET) 7.5-325 mg per tablet     Sig: Take 1 Tab by mouth three (3) times daily as needed for Pain for up to 30 days. Max Daily Amount: 3 Tabs.  Indications: Pain     Dispense:  90 Tab     Refill:  0    oxyCODONE-acetaminophen (PERCOCET) 7.5-325 mg per tablet     Sig: Take 1 Tab by mouth three (3) times daily as needed for Pain for up to 30 days. Max Daily Amount: 3 Tabs. Indications: Pain     Dispense:  90 Tab     Refill:  0    morphine CR (MS CONTIN) 15 mg CR tablet     Sig: Take 1 Tab by mouth every twelve (12) hours for 30 days. Max Daily Amount: 30 mg. Dispense:  60 Tab     Refill:  0    morphine CR (MS CONTIN) 15 mg CR tablet     Sig: Take 1 Tab by mouth every twelve (12) hours for 30 days. Max Daily Amount: 30 mg. Dispense:  60 Tab     Refill:  0    morphine CR (MS CONTIN) 15 mg CR tablet     Sig: Take 1 Tab by mouth every twelve (12) hours for 30 days. Max Daily Amount: 30 mg. Dispense:  60 Tab     Refill:  0    naloxone (NARCAN) 4 mg/actuation nasal spray     Si [place] by IntraNASal route as needed. Indications: OPIATE-INDUCED RESPIRATORY DEPRESSION     Dispense:  1 Each     Refill:  1       Pain medications prescribed with the objective of pain relief and improved physical and psychosocial function in this patient. Spent 25 minutes with patient today reviewing the treatment plan, goals of treatment plan, and limitations of the treatment plan, to include the potential for side effects from medications and procedures. Toshia Chun, [id number] 2017      Note: Please excuse any typographical errors. Voice recognition software was used for this note and may cause mistakes.

## 2017-12-07 NOTE — MR AVS SNAPSHOT
Visit Information Date & Time Provider Department Dept. Phone Encounter #  
 12/7/2017  1:00 PM Lou Camara [facility] [phone] Follow-up Instructions Return in about 3 months (around 3/7/2018). Upcoming Health Maintenance Date Due DTaP/Tdap/Td series (1 - Tdap) 3/17/1993 PAP AKA CERVICAL CYTOLOGY 5/14/2016 Influenza Age 5 to Adult 8/1/2017 Allergies as of 12/7/2017  Review Complete On: 12/7/2017 By: KAIT Cortes Severity Noted Reaction Type Reactions Wellbutrin [Bupropion Hcl]  09/09/2016    Other (comments)  
 manic Current Immunizations  Never Reviewed No immunizations on file. Not reviewed this visit You Were Diagnosed With   
  
 Codes Comments Myofascial pain dysfunction syndrome     ICD-10-CM: M79.1 ICD-9-CM: 729.1 Cervicalgia     ICD-10-CM: M54.2 ICD-9-CM: 723.1 Cervical radiculopathy     ICD-10-CM: M54.12 
ICD-9-CM: 723.4 DDD (degenerative disc disease), cervical     ICD-10-CM: M50.30 ICD-9-CM: 722.4 DDD (degenerative disc disease), lumbar     ICD-10-CM: M51.36 
ICD-9-CM: 722.52 Chronic midline low back pain without sciatica     ICD-10-CM: M54.5, G89.29 ICD-9-CM: 724.2, 338.29 Polyarthralgia     ICD-10-CM: M25.50 ICD-9-CM: 719.49 Vitals BP Pulse Temp Resp Height(growth percentile) Weight(growth percentile) 124/79 (BP 1 Location: Left arm, BP Patient Position: Sitting) 93 97.4 °F (36.3 °C) (Oral) 14 5' 9\" (1.753 m) 230 lb (104.3 kg) BMI OB Status Smoking Status 33.97 kg/m2 Having regular periods Former Smoker Vitals History BMI and BSA Data Body Mass Index Body Surface Area  
 33.97 kg/m 2 2.25 m 2 Your Updated Medication List  
  
   
This list is accurate as of: 12/7/17  1:50 PM.  Always use your most recent med list.  
  
  
  
  
 EFFEXOR  mg capsule Generic drug:  venlafaxine-SR Take 150 mg by mouth daily. ibuprofen 200 mg tablet Commonly known as:  MOTRIN Take 800 mg by mouth every eight (8) hours as needed for Pain. * morphine CR 15 mg CR tablet Commonly known as:  MS CONTIN Take 1 Tab by mouth every twelve (12) hours for 30 days. Max Daily Amount: 30 mg.  
  
 * morphine CR 15 mg CR tablet Commonly known as:  MS CONTIN Take 1 Tab by mouth every twelve (12) hours for 30 days. Max Daily Amount: 30 mg. Start taking on:  1/6/2018 * morphine CR 15 mg CR tablet Commonly known as:  MS CONTIN Take 1 Tab by mouth every twelve (12) hours for 30 days. Max Daily Amount: 30 mg. Start taking on:  2/5/2018 NexIUM 40 mg capsule Generic drug:  esomeprazole Take 40 mg by mouth daily. * oxyCODONE-acetaminophen 7.5-325 mg per tablet Commonly known as:  PERCOCET Take 1 Tab by mouth three (3) times daily as needed for Pain for up to 30 days. Max Daily Amount: 3 Tabs. Indications: Pain Start taking on:  12/22/2017 * oxyCODONE-acetaminophen 7.5-325 mg per tablet Commonly known as:  PERCOCET Take 1 Tab by mouth three (3) times daily as needed for Pain for up to 30 days. Max Daily Amount: 3 Tabs. Indications: Pain Start taking on:  1/21/2018 * oxyCODONE-acetaminophen 7.5-325 mg per tablet Commonly known as:  PERCOCET Take 1 Tab by mouth three (3) times daily as needed for Pain for up to 30 days. Max Daily Amount: 3 Tabs. Indications: Pain Start taking on:  2/20/2018  
  
 topiramate 100 mg tablet Commonly known as:  TOPAMAX Take 50 mg by mouth two (2) times a day. 50 in am and 100 in pm  
  
 traZODone 100 mg tablet Commonly known as:  [place] Take 100 mg by mouth nightly. TREXIMET  mg tablet Generic drug:  SUMAtriptan-naproxen Take 1 Tab by mouth once as needed. ZANAFLEX 4 mg tablet Generic drug:  tiZANidine Take 4 mg by mouth every six (6) hours as needed. * Notice: This list has 6 medication(s) that are the same as other medications prescribed for you. Read the directions carefully, and ask your doctor or other care provider to review them with you. Prescriptions Printed Refills  
 oxyCODONE-acetaminophen (PERCOCET) 7.5-325 mg per tablet 0 Starting on: 12/22/2017 Sig: Take 1 Tab by mouth three (3) times daily as needed for Pain for up to 30 days. Max Daily Amount: 3 Tabs. Indications: Pain Class: Print Route: Oral  
 oxyCODONE-acetaminophen (PERCOCET) 7.5-325 mg per tablet 0 Starting on: 1/21/2018 Sig: Take 1 Tab by mouth three (3) times daily as needed for Pain for up to 30 days. Max Daily Amount: 3 Tabs. Indications: Pain Class: Print Route: Oral  
 oxyCODONE-acetaminophen (PERCOCET) 7.5-325 mg per tablet 0 Starting on: 2/20/2018 Sig: Take 1 Tab by mouth three (3) times daily as needed for Pain for up to 30 days. Max Daily Amount: 3 Tabs. Indications: Pain Class: Print Route: Oral  
 morphine CR (MS CONTIN) 15 mg CR tablet 0 Sig: Take 1 Tab by mouth every twelve (12) hours for 30 days. Max Daily Amount: 30 mg.  
 Class: Print Route: Oral  
 morphine CR (MS CONTIN) 15 mg CR tablet 0 Starting on: 1/6/2018 Sig: Take 1 Tab by mouth every twelve (12) hours for 30 days. Max Daily Amount: 30 mg.  
 Class: Print Route: Oral  
 morphine CR (MS CONTIN) 15 mg CR tablet 0 Starting on: 2/5/2018 Sig: Take 1 Tab by mouth every twelve (12) hours for 30 days. Max Daily Amount: 30 mg.  
 Class: Print Route: Oral  
  
Follow-up Instructions Return in about 3 months (around 3/7/2018). Patient Instructions 1. Continue current plan with no evidence of addiction or diversion. Stable on current medication without adverse events. 2. Refill oxycodone 7.5/325 mg up to 3 times daily as needed. 3. Add morphine ER 15 mg every 12 hours. 4. Continue OTC ibuprofen as needed. With food only. 5. Continue to follow-up with orthopedic surgeon regarding recent shoulder surgery. 6. Discussed risks of addiction, dependency, and opioid induced hyperalgesia. 7. Return to clinic in 3 months Stopping Smokeless Tobacco Use: Care Instructions Your Care Instructions Smokeless tobacco comes in many forms, such as snuff and chewing tobacco: · Snuff is finely ground tobacco sold in cans or pouches. Most of the time, snuff is used by putting a \"pinch\" or \"dip\" between the lower lip or cheek and the gum. · Chewing tobacco is sold as loose leaves, plugs, or twists. It is chewed or placed between the cheek and the gum or teeth. There are plenty of reasons to stop using smokeless tobacco. These products are harmful. They are not risk-free alternatives to smoking. Smokeless tobacco contains nicotine, which is addicting. Though using smokeless tobacco is less harmful than smoking cigarettes, it can cause serious health problems, such as: 
· White patches or red sores in your mouth that can turn into mouth cancer involving the lip, tongue, or cheek. · Tooth loss and other dental problems. · Gum disease. Your gums may pull away from your teeth and not grow back. People who use smokeless tobacco crave the nicotine in it. Giving up smokeless tobacco is much harder than simply changing a habit. Your body has to stop craving the nicotine. It is hard to quit, but you can do it. Many tools are available for people who want to quit using smokeless tobacco. You may find that combining tools works best for you. There are several steps to quitting. First you get ready to quit. Then you get support to help you. After that, you learn new skills and behaviors to quit. For many people, a necessary step is getting and using medicine. Your doctor will help you set up the plan that best meets your needs.  You may want to attend a tobacco cessation program. When you choose a program, look for one that has proven success. Ask your doctor for ideas. You will greatly increase your chances of success if you take medicine as well as get counseling or join a cessation program. 
Some of the changes you feel when you first quit smokeless tobacco are uncomfortable. Your body will miss the nicotine at first, and you may feel short-tempered and grumpy. You may have trouble sleeping or concentrating. Medicine can help you deal with these symptoms. You may struggle with changing your habits and rituals. The last step is the tricky one: Be prepared for the urge to use smokeless tobacco to continue for a time. This is a lot to deal with, but keep at it. You will feel better. Follow-up care is a key part of your treatment and safety. Be sure to make and go to all appointments, and call your doctor if you are having problems. It's also a good idea to know your test results and keep a list of the medicines you take. How can you care for yourself at home? · Ask your family, friends, and coworkers for support. You have a better chance of quitting if you have help and support. · Join a support group for people who are trying to quit using smokeless tobacco. 
· Set a quit date. Pick your date carefully so that it is not right in the middle of a big deadline or stressful time. After you quit, do not use smokeless tobacco even once. Get rid of all spit cups, cans, and pouches after your last use. Clean your house and your clothes so that they do not smell of tobacco. 
· Learn how to be a non-user. Think about ways you can avoid those things that make you reach for tobacco. 
¨ Learn some ways to deal with cravings, like calling a friend or going for a walk. Cravings often pass. ¨ Avoid situations that put you at greatest risk for using smokeless tobacco. For some people, it is hard to spend time with friends without dipping or chewing.  For others, they might skip a coffee break with coworkers who smoke or use smokeless tobacco. 
¨ Change your daily routine. Take a different route to work, or eat a meal in a different place. · Cut down on stress. Calm yourself or release tension by doing an activity you enjoy, such as reading a book, taking a hot bath, or gardening. · Talk to your doctor or pharmacist about nicotine replacement therapy. You still get nicotine, but you do not use tobacco. Nicotine replacement products help you slowly reduce the amount of nicotine you need. Many of these products are available over the counter. They include nicotine patches, gum, lozenges, and inhalers. · Ask your doctor about bupropion (Wellbutrin) or varenicline (Chantix), which are prescription medicines. They do not contain nicotine. They help you by reducing withdrawal symptoms, such as stress and anxiety. · Get regular exercise. Having healthy habits will help your body move past its craving for nicotine. · Be prepared to keep trying. Most people are not successful the first few times they try to quit. Do not get mad at yourself if you use tobacco again. Make a list of things you learned, and think about when you want to try again, such as next week, next month, or next year. Where can you learn more? Go to http://jackie-yair.info/. Enter D323 in the search box to learn more about \"Stopping Smokeless Tobacco Use: Care Instructions. \" Current as of: March 20, 2017 Content Version: 11.4 © 1977-6382 [employer]. Care instructions adapted under license by [employer] (which disclaims liability or warranty for this information). If you have questions about a medical condition or this instruction, always ask your healthcare professional. [address] any warranty or liability for your use of this information. Introducing [facility]! Dear Kaycee Matute: Thank you for requesting a [employer] account.   Our records indicate that you already have an active [employer] account. You can access your account anytime at [email or web address] Did you know that you can access your hospital and ER discharge instructions at any time in [employer] You can also review all of your test results from your hospital stay or ER visit. Additional Information If you have questions, please visit the Frequently Asked Questions section of the [employer] website at [email or web address] Remember, [employer] is NOT to be used for urgent needs. For medical emergencies, dial 911. Now available from your iPhone and Android! Please provide this summary of care documentation to your next provider. Your primary care clinician is listed as Ry Laurent. If you have any questions after today's visit, please call [phone].

## 2018-03-12 ENCOUNTER — OFFICE VISIT (OUTPATIENT)
Dept: PAIN MANAGEMENT | Age: 48
End: 2018-03-12

## 2018-03-12 VITALS
HEART RATE: 86 BPM | WEIGHT: 245 LBS | TEMPERATURE: 98.9 F | BODY MASS INDEX: 36.29 KG/M2 | HEIGHT: 69 IN | SYSTOLIC BLOOD PRESSURE: 129 MMHG | DIASTOLIC BLOOD PRESSURE: 78 MMHG | RESPIRATION RATE: 14 BRPM

## 2018-03-12 DIAGNOSIS — M51.36 DDD (DEGENERATIVE DISC DISEASE), LUMBAR: ICD-10-CM

## 2018-03-12 DIAGNOSIS — M25.50 POLYARTHRALGIA: ICD-10-CM

## 2018-03-12 DIAGNOSIS — Z79.899 ENCOUNTER FOR LONG-TERM (CURRENT) USE OF HIGH-RISK MEDICATION: Primary | ICD-10-CM

## 2018-03-12 DIAGNOSIS — M54.50 CHRONIC MIDLINE LOW BACK PAIN WITHOUT SCIATICA: ICD-10-CM

## 2018-03-12 DIAGNOSIS — G89.29 CHRONIC MIDLINE LOW BACK PAIN WITHOUT SCIATICA: ICD-10-CM

## 2018-03-12 DIAGNOSIS — M50.30 DDD (DEGENERATIVE DISC DISEASE), CERVICAL: ICD-10-CM

## 2018-03-12 DIAGNOSIS — M15.9 GENERALIZED OA: ICD-10-CM

## 2018-03-12 DIAGNOSIS — G89.4 CHRONIC PAIN SYNDROME: ICD-10-CM

## 2018-03-12 DIAGNOSIS — M54.12 CERVICAL RADICULOPATHY: ICD-10-CM

## 2018-03-12 DIAGNOSIS — M79.18 MYOFASCIAL PAIN DYSFUNCTION SYNDROME: ICD-10-CM

## 2018-03-12 LAB
ALCOHOL UR POC: NORMAL
AMPHETAMINES UR POC: NEGATIVE
BARBITURATES UR POC: NORMAL
BENZODIAZEPINES UR POC: NEGATIVE
BUPRENORPHINE UR POC: NEGATIVE
CANNABINOIDS UR POC: NEGATIVE
CARISOPRODOL UR POC: NORMAL
COCAINE UR POC: NEGATIVE
FENTANYL UR POC: NORMAL
MDMA/ECSTASY UR POC: NORMAL
METHADONE UR POC: NEGATIVE
METHAMPHETAMINE UR POC: NORMAL
METHYLPHENIDATE UR POC: NORMAL
OPIATES UR POC: NORMAL
OXYCODONE UR POC: NORMAL
PHENCYCLIDINE UR POC: NORMAL
PROPOXYPHENE UR POC: NORMAL
TRAMADOL UR POC: NORMAL
TRICYCLICS UR POC: NORMAL

## 2018-03-12 RX ORDER — NALOXONE HYDROCHLORIDE 2 MG/.4ML
2 INJECTION, SOLUTION INTRAMUSCULAR; SUBCUTANEOUS
Qty: 1 DEVICE | Refills: 1 | Status: SHIPPED | OUTPATIENT
Start: 2018-03-12 | End: 2018-03-12 | Stop reason: SDUPTHER

## 2018-03-12 RX ORDER — MORPHINE SULFATE 15 MG/1
15 TABLET, FILM COATED, EXTENDED RELEASE ORAL EVERY 12 HOURS
Qty: 60 TAB | Refills: 0 | Status: SHIPPED | OUTPATIENT
Start: 2018-04-11 | End: 2018-06-18 | Stop reason: SDUPTHER

## 2018-03-12 RX ORDER — MORPHINE SULFATE 15 MG/1
15 TABLET, FILM COATED, EXTENDED RELEASE ORAL EVERY 12 HOURS
Qty: 60 TAB | Refills: 0 | Status: SHIPPED | OUTPATIENT
Start: 2018-05-10 | End: 2018-06-18 | Stop reason: SDUPTHER

## 2018-03-12 RX ORDER — NALOXONE HYDROCHLORIDE 2 MG/.4ML
2 INJECTION, SOLUTION INTRAMUSCULAR; SUBCUTANEOUS
Qty: 1 DEVICE | Refills: 1 | Status: SHIPPED | OUTPATIENT
Start: 2018-03-12 | End: 2019-07-22

## 2018-03-12 RX ORDER — OXYCODONE AND ACETAMINOPHEN 7.5; 325 MG/1; MG/1
1 TABLET ORAL
Qty: 60 TAB | Refills: 0 | Status: SHIPPED | OUTPATIENT
Start: 2018-05-09 | End: 2018-06-18 | Stop reason: SDUPTHER

## 2018-03-12 RX ORDER — OXYCODONE AND ACETAMINOPHEN 7.5; 325 MG/1; MG/1
1 TABLET ORAL
Qty: 60 TAB | Refills: 0 | Status: SHIPPED | OUTPATIENT
Start: 2018-06-08 | End: 2018-06-18 | Stop reason: SDUPTHER

## 2018-03-12 RX ORDER — SUMATRIPTAN 50 MG/1
100 TABLET, FILM COATED ORAL 2 TIMES DAILY
Refills: 5 | COMMUNITY
Start: 2018-03-04 | End: 2019-05-24

## 2018-03-12 RX ORDER — MORPHINE SULFATE 15 MG/1
15 TABLET, FILM COATED, EXTENDED RELEASE ORAL EVERY 12 HOURS
Qty: 60 TAB | Refills: 0 | Status: SHIPPED | OUTPATIENT
Start: 2018-03-12 | End: 2018-06-18 | Stop reason: SDUPTHER

## 2018-03-12 NOTE — PROGRESS NOTES
HISTORY OF PRESENT ILLNESS  eDbra Winkler is a 41 y.o. female    HPI: Ms. Kulwant Rodriguez  returns today for f/u of chronic, severe polyarticular pain involving the cervical and lumbar spine with radiation to the proximal upper extremities. She has left knee pain. PT for her left knee pain with good improvement. No h/o lumbar or neck surgery. Prior cervical and SI joint injections with some improvement. No h/o PT for back or neck. Ms. Kulwant Rodriguez reports mild improvement since last visit. We started morphine ER 15 mg every 12 hours. She is now able to use much less of her oxycodone. She has an unfilled prescription with her today as well as plenty of her last prescription left over. We discussed tapering her oxycodone down to no more than 2 times daily as needed. She is in agreement with this plan. She is otherwise doing well with no new complaints. We will continue with no changes today. I will have her follow-up in 3 months or sooner if needed. She does note that she never received a prescription for naloxone. A new prescription for naloxone injector will be sent today. Because the patient's current regimen places him/her at increased risk for possible overdose, a prescription for naloxone injector is being provided. The patient understands that this medication is only to be used in the setting of a possible overdose and that inadvertent use of this medication could precipitate overt withdrawal.    Current medication management consists of Oxycodone 7.5/325 mg TID PRN. Medications are helping with pain control and quality of life. Her pain is 2/10 with medication and 7/10 without. Pt describes pain as constant, aching, stabbing, and burning. Aggravating factors include standing for long periods and looking upward,  Relieved with rest, medication, and avoiding painful activities.  Current treatment is helping to improve general activity, mood, walking, sleep, enjoyment of life    In the past 30 days, the patient reports approximately 60% pain relief with current treatment/medications. She  is otherwise doing well with no other complaints today. She denies any adverse events including nausea, vomiting, dizziness, increased constipation, hallucinations, or seizures. education class on 6/23/17    POC UDS today. Confirmation pending. Allergies   Allergen Reactions    Wellbutrin [Bupropion Hcl] Other (comments)     manic       Past Surgical History:   Procedure Laterality Date    HX CHOLECYSTECTOMY  2010    HX HEENT      adenoids with tube placemen , age 13   [facility] Right [id number]    arthroscopic shoulder    HX ORTHOPAEDIC Left [id number]    foot repair after break , bone spur removal         Review of Systems   Constitutional: Negative for chills, fever and weight loss. HENT: Negative for congestion and sore throat. Eyes: Negative for blurred vision and double vision. Respiratory: Negative for cough, shortness of breath and wheezing. Cardiovascular: Negative for chest pain and palpitations. Gastrointestinal: Positive for heartburn. Negative for constipation, diarrhea, nausea and vomiting. Esophageal motility disorder   Genitourinary: Negative. Musculoskeletal: Positive for back pain, joint pain and neck pain. Negative for falls and myalgias. Neurological: Negative for dizziness, sensory change, seizures and loss of consciousness. Endo/Heme/Allergies: Does not bruise/bleed easily. Psychiatric/Behavioral: Positive for depression. The patient has insomnia. The patient is not nervous/anxious. Physical Exam   Constitutional: She is oriented to person, place, and time and well-developed, well-nourished, and in no distress. No distress. overweight   HENT:   Head: Normocephalic and atraumatic. Eyes: EOM are normal.   Pulmonary/Chest: Effort normal.   Musculoskeletal:        Cervical back: She exhibits decreased range of motion, tenderness and pain.         Lumbar back: She exhibits pain. She exhibits normal range of motion and no tenderness. Neurological: She is alert and oriented to person, place, and time. Skin: Skin is warm and dry. No rash noted. She is not diaphoretic. No erythema. Psychiatric: Mood, memory, affect and judgment normal.   Nursing note and vitals reviewed. ASSESSMENT:    1. Encounter for long-term (current) use of high-risk medication    2. Myofascial pain dysfunction syndrome    3. Cervical radiculopathy    4. DDD (degenerative disc disease), cervical    5. DDD (degenerative disc disease), lumbar    6. Chronic midline low back pain without sciatica    7. Polyarthralgia    8. Generalized OA    9. Chronic pain syndrome         [place] Prescription Monitoring Program was reviewed which does not demonstrate aberrancies and/or inconsistencies with regard to the historical prescribing of controlled medications to this patient by other providers. PLAN / Pt Instructions:  1. Continue current plan with no evidence of addiction or diversion. Stable on current medication without adverse events. 2. Refill oxycodone 7.5/325 mg up to 3 times daily as needed. 3. Refill Morphine ER 15 mg every 12 hours. 4. Continue OTC ibuprofen as needed. With food only. 5. Continue to follow-up with orthopedic surgeon regarding recent shoulder surgery. 6. Add naloxone 4 mg injector for opioid induced respiratory depression emergency only. Extensive counseling was provided regarding this medication. 7. Discussed risks of addiction, dependency, and opioid induced hyperalgesia. 8. Return to clinic in 3 months       Medications Ordered Today   Medications    morphine CR (MS CONTIN) 15 mg CR tablet     Sig: Take 1 Tab by mouth every twelve (12) hours for 30 days. Max Daily Amount: 30 mg. Dispense:  60 Tab     Refill:  0    morphine CR (MS CONTIN) 15 mg CR tablet     Sig: Take 1 Tab by mouth every twelve (12) hours for 30 days. Max Daily Amount: 30 mg. Dispense:  60 Tab     Refill:  0    morphine CR (MS CONTIN) 15 mg CR tablet     Sig: Take 1 Tab by mouth every twelve (12) hours for 30 days. Max Daily Amount: 30 mg. Dispense:  60 Tab     Refill:  0    oxyCODONE-acetaminophen (PERCOCET) 7.5-325 mg per tablet     Sig: Take 1 Tab by mouth two (2) times daily as needed for Pain for up to 30 days. Max Daily Amount: 2 Tabs. Indications: Pain     Dispense:  60 Tab     Refill:  0    oxyCODONE-acetaminophen (PERCOCET) 7.5-325 mg per tablet     Sig: Take 1 Tab by mouth two (2) times daily as needed for Pain for up to 30 days. Max Daily Amount: 2 Tabs. Indications: Pain     Dispense:  60 Tab     Refill:  0    DISCONTD: naloxone (EVZIO) 2 mg/0.4 mL auto-injector     Si.4 mL by IntraMUSCular route once as needed for Overdose. Dispense:  1 Device     Refill:  1    naloxone (EVZIO) 2 mg/0.4 mL auto-injector     Si.4 mL by IntraMUSCular route once as needed for Overdose. Dispense:  1 Device     Refill:  1       Pain medications prescribed with the objective of pain relief and improved physical and psychosocial function in this patient. Spent 25 minutes with patient today reviewing the treatment plan, goals of treatment plan, and limitations of the treatment plan, to include the potential for side effects from medications and procedures. Frank Pink 3/12/2018      Note: Please excuse any typographical errors. Voice recognition software was used for this note and may cause mistakes.

## 2018-03-12 NOTE — MR AVS SNAPSHOT
[address] [facility] [address] [phone] Patient: Carmita White MRN: [id number] XCK:[birth year] Visit Information Date & Time Provider Department Dept. Phone Encounter #  
 3/12/2018  1:00 PM Bree Mai [address] [place] [address] for Pain Management 21  Follow-up Instructions Return in about 3 months (around 6/12/2018). Upcoming Health Maintenance Date Due DTaP/Tdap/Td series (1 - Tdap) 3/17/1993 PAP AKA CERVICAL CYTOLOGY 5/14/2016 Influenza Age 5 to Adult 8/1/2017 Allergies as of 3/12/2018  Review Complete On: 3/12/2018 By: KAIT Pascal Severity Noted Reaction Type Reactions Wellbutrin [Bupropion Hcl]  09/09/2016    Other (comments)  
 manic Current Immunizations  Never Reviewed No immunizations on file. Not reviewed this visit You Were Diagnosed With   
  
 Codes Comments Encounter for long-term (current) use of high-risk medication    -  Primary ICD-10-CM: R12.771 ICD-9-CM: V58.69 Myofascial pain dysfunction syndrome     ICD-10-CM: M79.1 ICD-9-CM: 729.1 Cervical radiculopathy     ICD-10-CM: M54.12 
ICD-9-CM: 723.4 DDD (degenerative disc disease), cervical     ICD-10-CM: M50.30 ICD-9-CM: 722.4 DDD (degenerative disc disease), lumbar     ICD-10-CM: M51.36 
ICD-9-CM: 722.52 Chronic midline low back pain without sciatica     ICD-10-CM: M54.5, G89.29 ICD-9-CM: 724.2, 338.29 Polyarthralgia     ICD-10-CM: M25.50 ICD-9-CM: 719.49 Generalized OA     ICD-10-CM: M15.9 ICD-9-CM: 715.00 Chronic pain syndrome     ICD-10-CM: G89.4 ICD-9-CM: 338. 4 Vitals BP Pulse Temp Resp Height(growth percentile) Weight(growth percentile) 129/78 (BP 1 Location: Left arm, BP Patient Position: Sitting) 86 98.9 °F (37.2 °C) 14 5' 9\" (1.753 m) 245 lb (111.1 kg) BMI OB Status Smoking Status 36.18 kg/m2 Having regular periods Former Smoker BMI and BSA Data Body Mass Index Body Surface Area  
 36.18 kg/m 2 2.33 m 2 Preferred Pharmacy Pharmacy Name Phone [address] [place] #  [phone] Your Updated Medication List  
  
   
This list is accurate as of 3/12/18  1:44 PM.  Always use your most recent med list.  
  
  
  
  
 EFFEXOR  mg capsule Generic drug:  venlafaxine-SR Take 150 mg by mouth daily. ibuprofen 200 mg tablet Commonly known as:  MOTRIN Take 800 mg by mouth every eight (8) hours as needed for Pain. * morphine CR 15 mg CR tablet Commonly known as:  MS CONTIN Take 1 Tab by mouth every twelve (12) hours for 30 days. Max Daily Amount: 30 mg.  
  
 * morphine CR 15 mg CR tablet Commonly known as:  MS CONTIN Take 1 Tab by mouth every twelve (12) hours for 30 days. Max Daily Amount: 30 mg. Start taking on:  4/11/2018 * morphine CR 15 mg CR tablet Commonly known as:  MS CONTIN Take 1 Tab by mouth every twelve (12) hours for 30 days. Max Daily Amount: 30 mg. Start taking on:  5/10/2018 * naloxone 4 mg/actuation nasal spray Commonly known as:  NARCAN  
1 Thelma by IntraNASal route as needed. Indications: OPIATE-INDUCED RESPIRATORY DEPRESSION  
  
 * naloxone 2 mg/0.4 mL auto-injector Commonly known as:  EVZIO  
0.4 mL by IntraMUSCular route once as needed for Overdose. NexIUM 40 mg capsule Generic drug:  esomeprazole Take 40 mg by mouth daily. * oxyCODONE-acetaminophen 7.5-325 mg per tablet Commonly known as:  PERCOCET Take 1 Tab by mouth two (2) times daily as needed for Pain for up to 30 days. Max Daily Amount: 2 Tabs. Indications: Pain Start taking on:  5/9/2018 * oxyCODONE-acetaminophen 7.5-325 mg per tablet Commonly known as:  PERCOCET Take 1 Tab by mouth two (2) times daily as needed for Pain for up to 30 days. Max Daily Amount: 2 Tabs. Indications: Pain Start taking on:  6/8/2018 SUMAtriptan 50 mg tablet Commonly known as:  IMITREX Take 50 mg by mouth two (2) times a day. topiramate 100 mg tablet Commonly known as:  TOPAMAX Take 50 mg by mouth two (2) times a day. 50 in am and 100 in pm  
  
 traZODone 100 mg tablet Commonly known as:  Emelia Oconnorese Take 100 mg by mouth nightly. TREXIMET  mg tablet Generic drug:  SUMAtriptan-naproxen Take 1 Tab by mouth once as needed. ZANAFLEX 4 mg tablet Generic drug:  tiZANidine Take 4 mg by mouth every six (6) hours as needed. * Notice: This list has 7 medication(s) that are the same as other medications prescribed for you. Read the directions carefully, and ask your doctor or other care provider to review them with you. Prescriptions Printed Refills  
 morphine CR (MS CONTIN) 15 mg CR tablet 0 Sig: Take 1 Tab by mouth every twelve (12) hours for 30 days. Max Daily Amount: 30 mg.  
 Class: Print Route: Oral  
 morphine CR (MS CONTIN) 15 mg CR tablet 0 Starting on: 4/11/2018 Sig: Take 1 Tab by mouth every twelve (12) hours for 30 days. Max Daily Amount: 30 mg.  
 Class: Print Route: Oral  
 morphine CR (MS CONTIN) 15 mg CR tablet 0 Starting on: 5/10/2018 Sig: Take 1 Tab by mouth every twelve (12) hours for 30 days. Max Daily Amount: 30 mg.  
 Class: Print Route: Oral  
 oxyCODONE-acetaminophen (PERCOCET) 7.5-325 mg per tablet 0 Starting on: 5/9/2018 Sig: Take 1 Tab by mouth two (2) times daily as needed for Pain for up to 30 days. Max Daily Amount: 2 Tabs. Indications: Pain Class: Print Route: Oral  
 oxyCODONE-acetaminophen (PERCOCET) 7.5-325 mg per tablet 0 Starting on: 6/8/2018 Sig: Take 1 Tab by mouth two (2) times daily as needed for Pain for up to 30 days. Max Daily Amount: 2 Tabs. Indications: Pain Class: Print  Route: Oral  
  
 Prescriptions Sent to Pharmacy Refills  
 naloxone (EVZIO) 2 mg/0.4 mL auto-injector 1 Si.4 mL by IntraMUSCular route once as needed for Overdose. Class: Normal  
 Pharmacy: [address] [place] [address] # [id number]  #: [phone] Route: IntraMUSCular We Performed the Following AMB POC DRUG SCREEN () [ [facility]] DRUG SCREEN [[id number] Custom] Follow-up Instructions Return in about 3 months (around 2018). Patient Instructions 1. Continue current plan with no evidence of addiction or diversion. Stable on current medication without adverse events. 2. Refill oxycodone 7.5/325 mg up to 3 times daily as needed. 3. Refill Morphine ER 15 mg every 12 hours. 4. Continue OTC ibuprofen as needed. With food only. 5. Continue to follow-up with orthopedic surgeon regarding recent shoulder surgery. 6. Add naloxone 4 mg injector for opioid induced respiratory depression emergency only. Extensive counseling was provided regarding this medication. 7. Discussed risks of addiction, dependency, and opioid induced hyperalgesia. 8. Return to clinic in 3 months Introducing [facility]! Dear Josue Dumont: Thank you for requesting a [employer] account. Our records indicate that you already have an active [employer] account. You can access your account anytime at [email or web address] Did you know that you can access your hospital and ER discharge instructions at any time in [employer]? You can also review all of your test results from your hospital stay or ER visit. Additional Information If you have questions, please visit the Frequently Asked Questions section of the [employer] website at [email or web address] Remember, [employer] is NOT to be used for urgent needs. For medical emergencies, dial 911. Now available from your iPhone and Android! Please provide this summary of care documentation to your next provider. Your primary care clinician is listed as Frantz Huff. If you have any questions after today's visit, please call [phone].

## 2018-03-12 NOTE — PROGRESS NOTES
Nursing Notes    Patient presents to the office today in follow-up. Patient rates her pain at 1/10 on the numerical pain scale. Reviewed medications with counts as follows:    Rx Date filled Qty Dispensed Pill Count Last Dose Short   Percocet 7.5 mg 02/28/18 90 83 yesterday no   Ms. Contin 15 mg ER 02/11/18 60 7 This am  no         Comments: Patient is here today for a follow up appt today she states her pain level today is a 1  She states she is now taking Imitrex and Naproxsen due to her insurance. She is taking abx for a tooth issue    POC UDS was performed in office today per verbal order per [facility]    Any new labs or imaging since last appointment? NO    Have you been to an emergency room (ER) or urgent care clinic since your last visit? NO            Have you been hospitalized since your last visit? NO     If yes, where, when, and reason for visit? Have you seen or consulted any other health care providers outside of the [address] [place] [address]  since your last visit? NO     If yes, where, when, and reason for visit? HM deferred to pcp.

## 2018-03-12 NOTE — PATIENT INSTRUCTIONS
1. Continue current plan with no evidence of addiction or diversion. Stable on current medication without adverse events. 2. Refill oxycodone 7.5/325 mg up to 3 times daily as needed. 3. Refill Morphine ER 15 mg every 12 hours. 4. Continue OTC ibuprofen as needed. With food only. 5. Continue to follow-up with orthopedic surgeon regarding recent shoulder surgery. 6. Add naloxone 4 mg injector for opioid induced respiratory depression emergency only. Extensive counseling was provided regarding this medication. 7. Discussed risks of addiction, dependency, and opioid induced hyperalgesia.    8. Return to clinic in 3 months

## 2018-06-15 ENCOUNTER — TELEPHONE (OUTPATIENT)
Dept: PAIN MANAGEMENT | Age: 48
End: 2018-06-15

## 2018-06-18 ENCOUNTER — OFFICE VISIT (OUTPATIENT)
Dept: PAIN MANAGEMENT | Age: 48
End: 2018-06-18

## 2018-06-18 VITALS
TEMPERATURE: 99.1 F | SYSTOLIC BLOOD PRESSURE: 124 MMHG | BODY MASS INDEX: 36.29 KG/M2 | HEIGHT: 69 IN | HEART RATE: 79 BPM | RESPIRATION RATE: 12 BRPM | WEIGHT: 245 LBS | DIASTOLIC BLOOD PRESSURE: 84 MMHG

## 2018-06-18 DIAGNOSIS — M54.50 CHRONIC MIDLINE LOW BACK PAIN WITHOUT SCIATICA: ICD-10-CM

## 2018-06-18 DIAGNOSIS — M54.2 CERVICALGIA: ICD-10-CM

## 2018-06-18 DIAGNOSIS — M79.18 MYOFASCIAL PAIN DYSFUNCTION SYNDROME: ICD-10-CM

## 2018-06-18 DIAGNOSIS — M50.30 DDD (DEGENERATIVE DISC DISEASE), CERVICAL: ICD-10-CM

## 2018-06-18 DIAGNOSIS — G89.4 CHRONIC PAIN SYNDROME: ICD-10-CM

## 2018-06-18 DIAGNOSIS — M54.12 CERVICAL RADICULOPATHY: ICD-10-CM

## 2018-06-18 DIAGNOSIS — G89.29 CHRONIC MIDLINE LOW BACK PAIN WITHOUT SCIATICA: ICD-10-CM

## 2018-06-18 DIAGNOSIS — M15.9 GENERALIZED OA: ICD-10-CM

## 2018-06-18 DIAGNOSIS — M25.50 POLYARTHRALGIA: ICD-10-CM

## 2018-06-18 DIAGNOSIS — M51.36 DDD (DEGENERATIVE DISC DISEASE), LUMBAR: ICD-10-CM

## 2018-06-18 RX ORDER — OXYCODONE AND ACETAMINOPHEN 7.5; 325 MG/1; MG/1
1 TABLET ORAL
Qty: 60 TAB | Refills: 0 | Status: SHIPPED | OUTPATIENT
Start: 2018-06-25 | End: 2018-07-25

## 2018-06-18 RX ORDER — OXYCODONE AND ACETAMINOPHEN 7.5; 325 MG/1; MG/1
1 TABLET ORAL
Qty: 90 TAB | Refills: 0 | Status: SHIPPED | OUTPATIENT
Start: 2018-08-23 | End: 2018-09-11 | Stop reason: SDUPTHER

## 2018-06-18 RX ORDER — OXYCODONE AND ACETAMINOPHEN 7.5; 325 MG/1; MG/1
1 TABLET ORAL
Qty: 60 TAB | Refills: 0 | Status: SHIPPED | OUTPATIENT
Start: 2018-07-24 | End: 2018-10-03 | Stop reason: SDUPTHER

## 2018-06-18 RX ORDER — NAPROXEN 500 MG/1
500 TABLET ORAL 2 TIMES DAILY WITH MEALS
COMMUNITY
End: 2019-05-24

## 2018-06-18 RX ORDER — MORPHINE SULFATE 15 MG/1
15 TABLET, FILM COATED, EXTENDED RELEASE ORAL EVERY 12 HOURS
Qty: 60 TAB | Refills: 0 | Status: SHIPPED | OUTPATIENT
Start: 2018-07-19 | End: 2018-08-18

## 2018-06-18 RX ORDER — MORPHINE SULFATE 15 MG/1
15 TABLET, FILM COATED, EXTENDED RELEASE ORAL EVERY 12 HOURS
Qty: 60 TAB | Refills: 0 | Status: SHIPPED | OUTPATIENT
Start: 2018-08-18 | End: 2018-09-11 | Stop reason: SDUPTHER

## 2018-06-18 RX ORDER — MORPHINE SULFATE 15 MG/1
15 TABLET, FILM COATED, EXTENDED RELEASE ORAL EVERY 12 HOURS
Qty: 60 TAB | Refills: 0 | Status: SHIPPED | OUTPATIENT
Start: 2018-06-20 | End: 2018-07-20

## 2018-06-18 NOTE — PROGRESS NOTES
HISTORY OF PRESENT ILLNESS  Giuliano Wong is a 57 y.o. female    HPI: Ms. Jung Truong  returns today for f/u of chronic, severe polyarticular pain involving the cervical and lumbar spine with radiation to the proximal upper extremities. She has left knee pain. PT for her left knee pain with good improvement. No h/o lumbar or neck surgery. Prior cervical and SI joint injections with some improvement. No h/o PT for back or neck. Ms. Jung Truong continues unchanged as last visit. She has been doing well with her current treatment plan which has been offering significant pain control. We did have a lengthy conversation about changes to our practice. I explained to her that moving forward we will be focusing on more conservative and non-opioid plan of care. This will result in changes to her current treatment plan. I have agreed to continue with her current treatment plan for now but we will begin tapering her medications next visit. She does drive quite some distance but would like to continue for now. I have asked her to schedule appointment 3 months and to call and cancel her appointment and pain management agreement she does decide to transfer her care. Current medication management consists of morphine ER 15 mg every 12 hours and oxycodone 7.5/325 mg TID PRN. No concurrent benzodiazepines. Medications are helping with pain control and quality of life. Her pain is 2/10 with medication and 7/10 without. Pt describes pain as constant, aching, stabbing, and burning. Aggravating factors include standing for long periods and looking upward,  Relieved with rest, medication, and avoiding painful activities. Current treatment is helping to improve general activity, mood, walking, sleep, enjoyment of life    Ms. Saavedra is tolerating medications well, with no side effects noted. She is able to stay more active with less discomfort with these current doses.  In the past 30 days, the patient reports an average of 60% pain relief with current treatment/medications. She is informed of side effects, risks, and benefits of this regimen, and emphasizes that she derives a significant improvement in functionality and quality of life, and notes that non-opioid medications and therapies in the past have not offered significant benefit. She  is otherwise doing well with no other complaints today. She denies any adverse events including nausea, vomiting, dizziness, increased constipation, hallucinations, or seizures. Because the patient's current regimen places him/her at increased risk for possible overdose, a prescription for naloxone injector is being provided. The patient understands that this medication is only to be used in the setting of a possible overdose and that inadvertent use of this medication could precipitate overt withdrawal.    MME: 52.5  COMM: 6  OSWESTRY: 16 %  ORT: 3  PMA updated: 6/18/2018  education class on 6/23/17       Allergies   Allergen Reactions    Wellbutrin [Bupropion Hcl] Other (comments)     manic       Past Surgical History:   Procedure Laterality Date    HX CHOLECYSTECTOMY  2010    HX HEENT      adenoids with tube placemen , age 13   [facility] Right [phone]    arthroscopic shoulder    HX ORTHOPAEDIC Left [id number]    foot repair after break , bone spur removal         Review of Systems   Constitutional: Negative for chills, fever and weight loss. HENT: Negative for congestion and sore throat. Eyes: Negative for blurred vision and double vision. Respiratory: Negative for cough, shortness of breath and wheezing. Cardiovascular: Negative for chest pain and palpitations. Gastrointestinal: Positive for heartburn. Negative for constipation, diarrhea, nausea and vomiting. Esophageal motility disorder   Genitourinary: Negative. Musculoskeletal: Positive for back pain, joint pain and neck pain. Negative for falls and myalgias.    Neurological: Negative for dizziness, sensory change, seizures and loss of consciousness. Endo/Heme/Allergies: Does not bruise/bleed easily. Psychiatric/Behavioral: Positive for depression. The patient has insomnia. The patient is not nervous/anxious. Physical Exam   Constitutional: She is oriented to person, place, and time and well-developed, well-nourished, and in no distress. No distress. overweight   HENT:   Head: Normocephalic and atraumatic. Eyes: EOM are normal.   Pulmonary/Chest: Effort normal.   Musculoskeletal:        Cervical back: She exhibits decreased range of motion, tenderness and pain. Lumbar back: She exhibits pain. She exhibits normal range of motion and no tenderness. Neurological: She is alert and oriented to person, place, and time. Skin: Skin is warm and dry. No rash noted. She is not diaphoretic. No erythema. Psychiatric: Mood, memory, affect and judgment normal.   Nursing note and vitals reviewed. ASSESSMENT:    1. Chronic midline low back pain without sciatica    2. Chronic pain syndrome    3. Myofascial pain dysfunction syndrome    4. Cervical radiculopathy    5. DDD (degenerative disc disease), cervical    6. DDD (degenerative disc disease), lumbar    7. Cervicalgia    8. Polyarthralgia    9. Generalized OA         [place] Prescription Monitoring Program was reviewed which does not demonstrate aberrancies and/or inconsistencies with regard to the historical prescribing of controlled medications to this patient by other providers. PLAN / Pt Instructions:  1. Continue current plan with no evidence of addiction or diversion. Stable on current medication without adverse events. 2. Refill oxycodone 7.5/325 mg up to 3 times daily as needed. 3. Refill Morphine ER 15 mg every 12 hours. 4. Continue OTC ibuprofen as needed. With food only. 5. Continue to follow-up with orthopedic surgeon regarding recent shoulder surgery.   6. Naloxone 4 mg injector for opioid induced respiratory depression emergency only. 7. Discussed risks of addiction, dependency, and opioid induced hyperalgesia. Please remember to call at least 4-5 business days prior to your medication refill. Return to clinic in 3 months. Please call and cancel your appointment and pain management agreement if you do decide to transfer your care. Medications Ordered Today   Medications    morphine CR (MS CONTIN) 15 mg CR tablet     Sig: Take 1 Tab by mouth every twelve (12) hours for 30 days. Max Daily Amount: 30 mg. Dispense:  60 Tab     Refill:  0    oxyCODONE-acetaminophen (PERCOCET) 7.5-325 mg per tablet     Sig: Take 1 Tab by mouth two (2) times daily as needed for Pain for up to 30 days. Max Daily Amount: 2 Tabs. Indications: Pain     Dispense:  60 Tab     Refill:  0    morphine CR (MS CONTIN) 15 mg CR tablet     Sig: Take 1 Tab by mouth every twelve (12) hours for 30 days. Max Daily Amount: 30 mg. Dispense:  60 Tab     Refill:  0    morphine CR (MS CONTIN) 15 mg CR tablet     Sig: Take 1 Tab by mouth every twelve (12) hours for 30 days. Max Daily Amount: 30 mg. Dispense:  60 Tab     Refill:  0    oxyCODONE-acetaminophen (PERCOCET) 7.5-325 mg per tablet     Sig: Take 1 Tab by mouth two (2) times daily as needed for Pain for up to 30 days. Max Daily Amount: 2 Tabs. Indications: Pain     Dispense:  60 Tab     Refill:  0    oxyCODONE-acetaminophen (PERCOCET) 7.5-325 mg per tablet     Sig: Take 1 Tab by mouth three (3) times daily as needed for Pain for up to 30 days. Max Daily Amount: 3 Tabs. Indications: Pain     Dispense:  90 Tab     Refill:  0         DISPOSITION   Pain medications are prescribed with the objective of pain relief and improved physical and psychosocial function in this patient.  Patient has been counseled on proper use of prescribed medications.    Patient has been counseled about chronic medical conditions and their relationship to anxiety and depression and recommended mental health support as needed.  Reviewed with patient self-help tools, home exercise, and lifestyle changes to assist the patient in self-management of symptoms.  Reviewed with patient the treatment plan, goals of treatment plan, and limitations of treatment plan, to include the potential for side effects from medications and procedures. If side effects occur, it is the responsibility of the patient to inform the clinic so that a change in the treatment plan can be made in a safe manner. The patient is advised that stopping prescribed medication may cause an increase in symptoms and possible medication withdrawal symptoms. The patient is informed an emergency room evaluation may be necessary if this occurs. Spent 25 minutes with patient today which more than 50% of that time was spent on counseling and coordination of care. Frank Ruff 6/18/2018      Note: Please excuse any typographical errors. Voice recognition software was used for this note and may cause mistakes.

## 2018-06-18 NOTE — PATIENT INSTRUCTIONS
1. Continue current plan with no evidence of addiction or diversion. Stable on current medication without adverse events. 2. Refill oxycodone 7.5/325 mg up to 3 times daily as needed. 3. Refill Morphine ER 15 mg every 12 hours. 4. Continue OTC ibuprofen as needed. With food only. 5. Continue to follow-up with orthopedic surgeon regarding recent shoulder surgery. 6. Naloxone 4 mg injector for opioid induced respiratory depression emergency only. 7. Discussed risks of addiction, dependency, and opioid induced hyperalgesia. Please remember to call at least 4-5 business days prior to your medication refill. Return to clinic in 3 months. Please call and cancel your appointment and pain management agreement if you do decide to transfer your care.

## 2018-06-18 NOTE — PROGRESS NOTES
Nursing Notes    Patient presents to the office today in follow-up. Patient rates her pain at 3/10 on the numerical pain scale. Reviewed medications with counts as follows:    Rx Date filled Qty Dispensed Pill Count Last Dose Short   Percocet 7.5-325 mg tab 5/26/18 60 20 6/17/18 no   Morphine Sulf ER 15 mg tab 5/21/18 60 3 today no                                Comments: The pt's COMM was completed and given to the provider for review. The pt scored an 6. POC UDS was not performed in office today. Any new labs or imaging since last appointment? YES, Lab work for physical.    Have you been to an emergency room (ER) or urgent care clinic since your last visit? YES, Urgent Care for Bronchitis. Have you been hospitalized since your last visit? NO     If yes, where, when, and reason for visit? Have you seen or consulted any other health care providers outside of the [address] [place] [address]  since your last visit? YES, PCP, and Urgent Care Dr.     If yes, where, when, and reason for visit? Ms. Saavedra has a reminder for a \"due or due soon\" health maintenance. I have asked that she contact her primary care provider for follow-up on this health maintenance.

## 2018-06-18 NOTE — MR AVS SNAPSHOT
[address] [id number] [phone] Patient: Kayla Martinez MRN: [id number] QCO:[birth year]02 Visit Information Date & Time Provider Department Dept. Phone Encounter #  
 6/18/2018  1:00 PM Nika Maloney [address] for Pain Management  Follow-up Instructions Return in about 3 months (around 9/18/2018). Upcoming Health Maintenance Date Due DTaP/Tdap/Td series (1 - Tdap) 3/17/1993 PAP AKA CERVICAL CYTOLOGY 5/14/2016 Influenza Age 5 to Adult 8/1/2018 Allergies as of 6/18/2018  Review Complete On: 6/18/2018 By: KAIT Wiseman Severity Noted Reaction Type Reactions Wellbutrin [Bupropion Hcl]  09/09/2016    Other (comments)  
 manic Current Immunizations  Never Reviewed No immunizations on file. Not reviewed this visit You Were Diagnosed With   
  
 Codes Comments Chronic midline low back pain without sciatica     ICD-10-CM: M54.5, G89.29 ICD-9-CM: 724.2, 338.29 Chronic pain syndrome     ICD-10-CM: G89.4 ICD-9-CM: 338. 4 Myofascial pain dysfunction syndrome     ICD-10-CM: M79.1 ICD-9-CM: 729.1 Cervical radiculopathy     ICD-10-CM: M54.12 
ICD-9-CM: 723.4 DDD (degenerative disc disease), cervical     ICD-10-CM: M50.30 ICD-9-CM: 722.4 DDD (degenerative disc disease), lumbar     ICD-10-CM: M51.36 
ICD-9-CM: 722.52 Cervicalgia     ICD-10-CM: M54.2 ICD-9-CM: 723.1 Polyarthralgia     ICD-10-CM: M25.50 ICD-9-CM: 719.49 Generalized OA     ICD-10-CM: M15.9 ICD-9-CM: 715.00 Vitals BP Pulse Temp Resp Height(growth percentile) Weight(growth percentile) 124/84 (BP 1 Location: Right arm, BP Patient Position: Sitting) 79 99.1 °F (37.3 °C) (Oral) 12 5' 9\" (1.753 m) 245 lb (111.1 kg) BMI OB Status Smoking Status 36.18 kg/m2 Having regular periods Former Smoker Vitals History BMI and BSA Data Body Mass Index Body Surface Area  
 36.18 kg/m 2 2.33 m 2 Preferred Pharmacy Pharmacy Name Phone [facility] #  [phone] Your Updated Medication List  
  
   
This list is accurate as of 6/18/18  2:05 PM.  Always use your most recent med list.  
  
  
  
  
 EFFEXOR  mg capsule Generic drug:  venlafaxine-SR Take 150 mg by mouth daily. ibuprofen 200 mg tablet Commonly known as:  MOTRIN Take 800 mg by mouth every eight (8) hours as needed for Pain. LOESTRIN FE 1/20 (28-DAY) PO Take  by mouth. * morphine CR 15 mg CR tablet Commonly known as:  MS CONTIN Take 1 Tab by mouth every twelve (12) hours for 30 days. Max Daily Amount: 30 mg. Start taking on:  6/20/2018 * morphine CR 15 mg CR tablet Commonly known as:  MS CONTIN Take 1 Tab by mouth every twelve (12) hours for 30 days. Max Daily Amount: 30 mg. Start taking on:  7/19/2018 * morphine CR 15 mg CR tablet Commonly known as:  MS CONTIN Take 1 Tab by mouth every twelve (12) hours for 30 days. Max Daily Amount: 30 mg. Start taking on:  8/18/2018 * naloxone 4 mg/actuation nasal spray Commonly known as:  NARCAN  
1 [place] by IntraNASal route as needed. Indications: OPIATE-INDUCED RESPIRATORY DEPRESSION  
  
 * naloxone 2 mg/0.4 mL auto-injector Commonly known as:  EVZIO  
0.4 mL by IntraMUSCular route once as needed for Overdose.  
  
 naproxen 500 mg tablet Commonly known as:  NAPROSYN Take 500 mg by mouth two (2) times daily (with meals). NexIUM 40 mg capsule Generic drug:  esomeprazole Take 40 mg by mouth daily. * oxyCODONE-acetaminophen 7.5-325 mg per tablet Commonly known as:  PERCOCET Take 1 Tab by mouth two (2) times daily as needed for Pain for up to 30 days. Max Daily Amount: 2 Tabs. Indications: Pain Start taking on:  6/25/2018 * oxyCODONE-acetaminophen 7.5-325 mg per tablet Commonly known as:  PERCOCET Take 1 Tab by mouth two (2) times daily as needed for Pain for up to 30 days. Max Daily Amount: 2 Tabs. Indications: Pain Start taking on:  7/24/2018 * oxyCODONE-acetaminophen 7.5-325 mg per tablet Commonly known as:  PERCOCET Take 1 Tab by mouth three (3) times daily as needed for Pain for up to 30 days. Max Daily Amount: 3 Tabs. Indications: Pain Start taking on:  8/23/2018 SUMAtriptan 50 mg tablet Commonly known as:  IMITREX Take 50 mg by mouth two (2) times a day. topiramate 100 mg tablet Commonly known as:  TOPAMAX Take 50 mg by mouth two (2) times a day. 50 in am and 100 in pm  
  
 traZODone 100 mg tablet Commonly known as:   [place] Take 100 mg by mouth nightly. TREXIMET  mg tablet Generic drug:  SUMAtriptan-naproxen Take 1 Tab by mouth once as needed. ZANAFLEX 4 mg tablet Generic drug:  tiZANidine Take 4 mg by mouth every six (6) hours as needed. * Notice: This list has 8 medication(s) that are the same as other medications prescribed for you. Read the directions carefully, and ask your doctor or other care provider to review them with you. Prescriptions Printed Refills  
 morphine CR (MS CONTIN) 15 mg CR tablet 0 Starting on: 6/20/2018 Sig: Take 1 Tab by mouth every twelve (12) hours for 30 days. Max Daily Amount: 30 mg.  
 Class: Print Route: Oral  
 oxyCODONE-acetaminophen (PERCOCET) 7.5-325 mg per tablet 0 Starting on: 6/25/2018 Sig: Take 1 Tab by mouth two (2) times daily as needed for Pain for up to 30 days. Max Daily Amount: 2 Tabs. Indications: Pain Class: Print Route: Oral  
 morphine CR (MS CONTIN) 15 mg CR tablet 0 Starting on: 7/19/2018 Sig: Take 1 Tab by mouth every twelve (12) hours for 30 days. Max Daily Amount: 30 mg.  
 Class: Print  Route: Oral  
 morphine CR (MS CONTIN) 15 mg CR tablet 0  
 Starting on: 8/18/2018 Sig: Take 1 Tab by mouth every twelve (12) hours for 30 days. Max Daily Amount: 30 mg.  
 Class: Print Route: Oral  
 oxyCODONE-acetaminophen (PERCOCET) 7.5-325 mg per tablet 0 Starting on: 7/24/2018 Sig: Take 1 Tab by mouth two (2) times daily as needed for Pain for up to 30 days. Max Daily Amount: 2 Tabs. Indications: Pain Class: Print Route: Oral  
 oxyCODONE-acetaminophen (PERCOCET) 7.5-325 mg per tablet 0 Starting on: 8/23/2018 Sig: Take 1 Tab by mouth three (3) times daily as needed for Pain for up to 30 days. Max Daily Amount: 3 Tabs. Indications: Pain Class: Print Route: Oral  
  
Follow-up Instructions Return in about 3 months (around 9/18/2018). Patient Instructions 1. Continue current plan with no evidence of addiction or diversion. Stable on current medication without adverse events. 2. Refill oxycodone 7.5/325 mg up to 3 times daily as needed. 3. Refill Morphine ER 15 mg every 12 hours. 4. Continue OTC ibuprofen as needed. With food only. 5. Continue to follow-up with orthopedic surgeon regarding recent shoulder surgery. 6. Naloxone 4 mg injector for opioid induced respiratory depression emergency only. 7. Discussed risks of addiction, dependency, and opioid induced hyperalgesia. Please remember to call at least 4-5 business days prior to your medication refill. Return to clinic in 3 months. Please call and cancel your appointment and pain management agreement if you do decide to transfer your care. Introducing [facility]! Dear Cristo Choudhury: Thank you for requesting a [employer] account. Our records indicate that you already have an active [employer] account. You can access your account anytime at [email or web address] Did you know that you can access your hospital and ER discharge instructions at any time in [employer]endor Registry?   You can also review all of your test results from your hospital stay or ER visit. Additional Information If you have questions, please visit the Frequently Asked Questions section of the [employer] website at [email or web address] Remember, [employer] is NOT to be used for urgent needs. For medical emergencies, dial 911. Now available from your iPhone and Android! Please provide this summary of care documentation to your next provider. Your primary care clinician is listed as Lenin Ferrer. If you have any questions after today's visit, please call [phone].

## 2018-07-19 ENCOUNTER — TELEPHONE (OUTPATIENT)
Dept: PAIN MANAGEMENT | Age: 48
End: 2018-07-19

## 2018-07-19 ENCOUNTER — DOCUMENTATION ONLY (OUTPATIENT)
Dept: PAIN MANAGEMENT | Age: 48
End: 2018-07-19

## 2018-07-19 NOTE — TELEPHONE ENCOUNTER
Vangie Davis has called requesting a refill of their controlled medication, Morphine 15 mg and Percocet 7.5-325 mg, for the management of Chronic midline low back pain without sciatica. Last office visit date: 6/18/18    Date last  was pulled and reviewed : 7/19/18 and compliant. Last filled 6/19/18 and 6/27/18    Was the patient compliant when the above report was pulled? yes    Analgesia: Patient report 70% of pain relief with current medication regimen    Aberrancies: No aberranices in the last 30 days. ADL's: Patient report she is able to do basic ADL's at home. Adverse Reaction:Patient report no adverse reaction. Provider's last note and plan of care reviewed? yes  Request forwarded to provider for review.

## 2018-08-23 ENCOUNTER — TELEPHONE (OUTPATIENT)
Dept: PAIN MANAGEMENT | Age: 48
End: 2018-08-23

## 2018-08-23 ENCOUNTER — DOCUMENTATION ONLY (OUTPATIENT)
Dept: PAIN MANAGEMENT | Age: 48
End: 2018-08-23

## 2018-08-23 NOTE — TELEPHONE ENCOUNTER
Patient called for refill of medication 820 1:13pm    1. Name,  verified  2. Medication requested - morphine, oxycodone  3.  [phone]  4. Percentage of relief from medication - 90  5. Yes can do daily tasks  6.   No side effects from medications

## 2018-08-23 NOTE — TELEPHONE ENCOUNTER
Guillewendy Hoyt has called requesting a refill of their controlled medication, Morphine 15 mg and Oxycodone 7.5-325, for the management of Chronic midline low back pain without sciatica. Last office visit date: 6/18/18    Date last  was pulled and reviewed : 8/23/18 and compliant. Last filled 7/23/18 and 7/24/18    Was the patient compliant when the above report was pulled? yes    Analgesia: Patient report 90% of pain relief with current medication regimen    Aberrancies: No aberrancies in the last 30 days. ADL's: Patient report she is able to do basic ADL's at home. Adverse Reaction:Patient report no adverse reaction. Provider's last note and plan of care reviewed? yes  Request forwarded to provider for review. Provider was made aware.

## 2018-09-11 DIAGNOSIS — M54.12 CERVICAL RADICULOPATHY: ICD-10-CM

## 2018-09-11 DIAGNOSIS — G89.4 CHRONIC PAIN SYNDROME: ICD-10-CM

## 2018-09-11 DIAGNOSIS — M54.50 CHRONIC MIDLINE LOW BACK PAIN WITHOUT SCIATICA: ICD-10-CM

## 2018-09-11 DIAGNOSIS — G89.29 CHRONIC MIDLINE LOW BACK PAIN WITHOUT SCIATICA: ICD-10-CM

## 2018-09-11 DIAGNOSIS — M50.30 DDD (DEGENERATIVE DISC DISEASE), CERVICAL: ICD-10-CM

## 2018-09-11 DIAGNOSIS — M51.36 DDD (DEGENERATIVE DISC DISEASE), LUMBAR: ICD-10-CM

## 2018-09-11 DIAGNOSIS — M54.2 CERVICALGIA: ICD-10-CM

## 2018-09-11 DIAGNOSIS — M79.18 MYOFASCIAL PAIN DYSFUNCTION SYNDROME: ICD-10-CM

## 2018-09-11 RX ORDER — OXYCODONE AND ACETAMINOPHEN 7.5; 325 MG/1; MG/1
1 TABLET ORAL
Qty: 90 TAB | Refills: 0 | Status: SHIPPED | OUTPATIENT
Start: 2018-09-23 | End: 2018-10-03 | Stop reason: SDUPTHER

## 2018-09-11 RX ORDER — MORPHINE SULFATE 15 MG/1
15 TABLET, FILM COATED, EXTENDED RELEASE ORAL EVERY 12 HOURS
Qty: 60 TAB | Refills: 0 | Status: SHIPPED | OUTPATIENT
Start: 2018-09-24 | End: 2018-10-03 | Stop reason: SDUPTHER

## 2018-10-03 ENCOUNTER — OFFICE VISIT (OUTPATIENT)
Dept: PAIN MANAGEMENT | Age: 48
End: 2018-10-03

## 2018-10-03 VITALS
BODY MASS INDEX: 35.55 KG/M2 | TEMPERATURE: 97 F | RESPIRATION RATE: 14 BRPM | WEIGHT: 240 LBS | HEIGHT: 69 IN | DIASTOLIC BLOOD PRESSURE: 83 MMHG | SYSTOLIC BLOOD PRESSURE: 123 MMHG | HEART RATE: 81 BPM

## 2018-10-03 DIAGNOSIS — M54.12 CERVICAL RADICULOPATHY: ICD-10-CM

## 2018-10-03 DIAGNOSIS — G89.4 CHRONIC PAIN SYNDROME: ICD-10-CM

## 2018-10-03 DIAGNOSIS — M51.36 DDD (DEGENERATIVE DISC DISEASE), LUMBAR: ICD-10-CM

## 2018-10-03 DIAGNOSIS — M79.18 MYOFASCIAL PAIN DYSFUNCTION SYNDROME: ICD-10-CM

## 2018-10-03 DIAGNOSIS — M54.2 CERVICALGIA: ICD-10-CM

## 2018-10-03 DIAGNOSIS — G89.29 CHRONIC MIDLINE LOW BACK PAIN WITHOUT SCIATICA: ICD-10-CM

## 2018-10-03 DIAGNOSIS — M54.50 CHRONIC MIDLINE LOW BACK PAIN WITHOUT SCIATICA: ICD-10-CM

## 2018-10-03 DIAGNOSIS — Z79.899 ENCOUNTER FOR LONG-TERM (CURRENT) USE OF HIGH-RISK MEDICATION: Primary | ICD-10-CM

## 2018-10-03 DIAGNOSIS — M50.30 DDD (DEGENERATIVE DISC DISEASE), CERVICAL: ICD-10-CM

## 2018-10-03 RX ORDER — MORPHINE SULFATE 15 MG/1
15 TABLET, FILM COATED, EXTENDED RELEASE ORAL DAILY
Qty: 30 TAB | Refills: 0 | Status: SHIPPED | OUTPATIENT
Start: 2018-11-25 | End: 2018-12-27 | Stop reason: CLARIF

## 2018-10-03 RX ORDER — OXYCODONE AND ACETAMINOPHEN 7.5; 325 MG/1; MG/1
1 TABLET ORAL
Qty: 90 TAB | Refills: 0 | Status: SHIPPED | OUTPATIENT
Start: 2018-11-25 | End: 2018-12-27 | Stop reason: SDUPTHER

## 2018-10-03 RX ORDER — METFORMIN HYDROCHLORIDE 500 MG/1
500 TABLET ORAL 2 TIMES DAILY
Refills: 5 | COMMUNITY
Start: 2018-08-27 | End: 2018-12-27

## 2018-10-03 RX ORDER — MORPHINE SULFATE 15 MG/1
15 TABLET, FILM COATED, EXTENDED RELEASE ORAL EVERY 12 HOURS
Qty: 60 TAB | Refills: 0 | Status: SHIPPED | OUTPATIENT
Start: 2018-10-26 | End: 2018-11-25

## 2018-10-03 RX ORDER — OXYCODONE AND ACETAMINOPHEN 7.5; 325 MG/1; MG/1
1 TABLET ORAL
Qty: 90 TAB | Refills: 0 | Status: SHIPPED | OUTPATIENT
Start: 2018-10-26 | End: 2018-11-25

## 2018-10-03 RX ORDER — OXYCODONE AND ACETAMINOPHEN 7.5; 325 MG/1; MG/1
1 TABLET ORAL
Qty: 120 TAB | Refills: 0 | Status: SHIPPED | OUTPATIENT
Start: 2018-12-23 | End: 2018-12-27 | Stop reason: CLARIF

## 2018-10-03 NOTE — PROGRESS NOTES
HISTORY OF PRESENT ILLNESS  Bryan Lee is a 57 y.o. female    HPI: Ms. Michael Gutierres  returns today for f/u of chronic, severe polyarticular pain involving the cervical and lumbar spine with radiation to the proximal upper extremities. She has left knee pain. PT for her left knee pain with good improvement. No h/o lumbar or neck surgery. Prior cervical and SI joint injections with some improvement. No h/o PT for back or neck. Ms. Michael Gutierres continues unchanged as last visit. She reports no significant changes since her last visit. She continues to do well with her current treatment plan which has been offering significant pain control. We began tapering her morphine during her last visit after lengthy discussion regarding changes to our practice. She has been doing well with tapering plan so far but does report some mild increased pain. She lives in [facility] and has been trying to find a practice closer to her home. I have reminded her to call and cancel her appointment and pain management agreement if she does decide to transfer care. We will continue to work on developing a conference having conservative treatment plan moving forward. I will have her follow-up in 3 months for further evaluation and recommendation or sooner if needed. Current medication management consists of morphine ER 15 mg every 12 hours and oxycodone 7.5/325 mg TID PRN. No concurrent benzodiazepines. Medications are helping with pain control and quality of life. Her pain is 2/10 with medication and 7/10 without. Pt describes pain as constant, aching, stabbing, and burning. Aggravating factors include standing for long periods and looking upward,  Relieved with rest, medication, and avoiding painful activities. Current treatment is helping to improve general activity, mood, walking, sleep, enjoyment of life    Ms. Saavedra is tolerating medications well, with no side effects noted.  She is able to stay more active with less discomfort with these current doses. In the past 30 days, the patient reports an average of 60% pain relief with current treatment/medications. She is informed of side effects, risks, and benefits of this regimen, and emphasizes that she derives a significant improvement in functionality and quality of life, and notes that non-opioid medications and therapies in the past have not offered significant benefit. She  is otherwise doing well with no other complaints today. She denies any adverse events including nausea, vomiting, dizziness, increased constipation, hallucinations, or seizures. Because the patient's current regimen places him/her at increased risk for possible overdose, a prescription for naloxone injector is being provided. The patient understands that this medication is only to be used in the setting of a possible overdose and that inadvertent use of this medication could precipitate overt withdrawal.    MME: 63.8  COMM: 1  OSWESTRY: 18 %  ORT: 3  PMA updated: 6/18/2018  POC UDS today. Confirmation pending. education class on 6/23/17       Allergies   Allergen Reactions    Wellbutrin [Bupropion Hcl] Other (comments)     manic       Past Surgical History:   Procedure Laterality Date    HX CHOLECYSTECTOMY  2010    HX HEENT      adenoids with tube placemen , age 13   [de-identified] ORTHOPAEDIC Right 4538,6568    arthroscopic shoulder    HX ORTHOPAEDIC Left [id number]    foot repair after break , bone spur removal         Review of Systems   Constitutional: Negative for chills, fever and weight loss. HENT: Negative for congestion and sore throat. Eyes: Negative for blurred vision and double vision. Respiratory: Negative for cough, shortness of breath and wheezing. Cardiovascular: Negative for chest pain and palpitations. Gastrointestinal: Positive for heartburn. Negative for constipation, diarrhea, nausea and vomiting. Esophageal motility disorder   Genitourinary: Negative. Musculoskeletal: Positive for back pain, joint pain and neck pain. Negative for falls and myalgias. Neurological: Negative for dizziness, sensory change, seizures and loss of consciousness. Endo/Heme/Allergies: Does not bruise/bleed easily. Psychiatric/Behavioral: Positive for depression. The patient has insomnia. The patient is not nervous/anxious. Physical Exam   Constitutional: She is oriented to person, place, and time and well-developed, well-nourished, and in no distress. No distress. overweight   HENT:   Head: Normocephalic and atraumatic. Eyes: EOM are normal.   Pulmonary/Chest: Effort normal.   Musculoskeletal:        Cervical back: She exhibits decreased range of motion, tenderness and pain. Lumbar back: She exhibits pain. She exhibits normal range of motion and no tenderness. Neurological: She is alert and oriented to person, place, and time. Skin: Skin is warm and dry. No rash noted. She is not diaphoretic. No erythema. Psychiatric: Mood, memory, affect and judgment normal.   Nursing note and vitals reviewed. ASSESSMENT:    1. Encounter for long-term (current) use of high-risk medication    2. Chronic midline low back pain without sciatica    3. Chronic pain syndrome    4. Myofascial pain dysfunction syndrome    5. Cervical radiculopathy    6. DDD (degenerative disc disease), cervical    7. DDD (degenerative disc disease), lumbar    8. [address] [place] [address] Prescription Monitoring Program was reviewed which does not demonstrate aberrancies and/or inconsistencies with regard to the historical prescribing of controlled medications to this patient by other providers. PLAN / Pt Instructions:  1. Modify current plan with no evidence of addiction or diversion. Stable on current medication without adverse events.     2. Refill oxycodone 7.5/325 mg up to 3 times daily as needed pain times 2 months, then adjust up to 4 times daily as needed until next visit.  3. Refill Morphine ER 15 mg every 12 hours times 1 month, then adjust down to 15 mg once daily times 1 month, then discontinue  4. Continue OTC ibuprofen as needed. With food only. 5. Continue to follow-up with orthopedic surgeon regarding recent shoulder surgery. 6. Naloxone 4 mg injector for opioid induced respiratory depression emergency only. 7. Discussed risks of addiction, dependency, and opioid induced hyperalgesia. Please remember to call at least 5 business days prior to your medication refill. Return to clinic in 3 months. Please call and cancel your appointment and pain management agreement if you do decide to transfer your care. Medications Ordered Today   Medications    morphine CR (MS CONTIN) 15 mg CR tablet     Sig: Take 1 Tab by mouth every twelve (12) hours for 30 days. Max Daily Amount: 30 mg. Dispense:  60 Tab     Refill:  0    morphine CR (MS CONTIN) 15 mg CR tablet     Sig: Take 1 Tab by mouth daily for 30 days. Max Daily Amount: 15 mg. Dispense:  30 Tab     Refill:  0    oxyCODONE-acetaminophen (PERCOCET) 7.5-325 mg per tablet     Sig: Take 1 Tab by mouth three (3) times daily as needed for Pain for up to 30 days. Max Daily Amount: 3 Tabs. Indications: Pain     Dispense:  90 Tab     Refill:  0    oxyCODONE-acetaminophen (PERCOCET) 7.5-325 mg per tablet     Sig: Take 1 Tab by mouth three (3) times daily as needed for Pain for up to 30 days. Max Daily Amount: 3 Tabs. Indications: Pain     Dispense:  90 Tab     Refill:  0    oxyCODONE-acetaminophen (PERCOCET) 7.5-325 mg per tablet     Sig: Take 1 Tab by mouth four (4) times daily as needed for Pain for up to 30 days. Max Daily Amount: 4 Tabs. Indications: Pain     Dispense:  120 Tab     Refill:  0       DISPOSITION   Pain medications are prescribed with the objective of pain relief and improved physical and psychosocial function in this patient.    Patient has been counseled on proper use of prescribed medications.  Patient has been counseled about chronic medical conditions and their relationship to anxiety and depression and recommended mental health support as needed.  Reviewed with patient self-help tools, home exercise, and lifestyle changes to assist the patient in self-management of symptoms.  Reviewed with patient the treatment plan, goals of treatment plan, and limitations of treatment plan, to include the potential for side effects from medications and procedures. If side effects occur, it is the responsibility of the patient to inform the clinic so that a change in the treatment plan can be made in a safe manner. The patient is advised that stopping prescribed medication may cause an increase in symptoms and possible medication withdrawal symptoms. The patient is informed an emergency room evaluation may be necessary if this occurs. Spent 25 minutes with patient today which more than 50% of that time was spent on counseling and coordination of care. Jessica Nicole, [address] 10/3/2018      Note: Please excuse any typographical errors. Voice recognition software was used for this note and may cause mistakes.

## 2018-10-03 NOTE — PATIENT INSTRUCTIONS
1. Modify current plan with no evidence of addiction or diversion. Stable on current medication without adverse events. 2. Refill oxycodone 7.5/325 mg up to 3 times daily as needed pain times 2 months, then adjust up to 4 times daily as needed until next visit. 3. Refill Morphine ER 15 mg every 12 hours times 1 month, then adjust down to 15 mg once daily times 1 month, then discontinue  4. Continue OTC ibuprofen as needed. With food only. 5. Continue to follow-up with orthopedic surgeon regarding recent shoulder surgery. 6. Naloxone 4 mg injector for opioid induced respiratory depression emergency only. 7. Discussed risks of addiction, dependency, and opioid induced hyperalgesia. Please remember to call at least 5 business days prior to your medication refill. Return to clinic in 3 months. Please call and cancel your appointment and pain management agreement if you do decide to transfer your care.

## 2018-10-03 NOTE — PROGRESS NOTES
Nursing Notes    Patient presents to the office today in follow-up. Patient rates her pain at 1/10 on the numerical pain scale. Reviewed medications with counts as follows:    Rx Date filled Qty Dispensed Pill Count Last Dose Short   Percocet 7.5 mg 09/27/18 90 76 This am  no   Morphine 15 mg  09/27/18 60 50 Last pm no       Last opioid agreement 06/18/18  Last urine drug screen 03/12/18    Comments: Patient is here today for a follow up appt today she states her pain level today is a 1  PHQ 2 was done patient denies any depression. She is taking meds for depression . She also states that she was in the ER for a migraine. She states no labs were taken or imaging was done . POC UDS was performed in office today per verbal order per [facility]    Any new labs or imaging since last appointment? NO    Have you been to an emergency room (ER) or urgent care clinic since your last visit? YES            Have you been hospitalized since your last visit? NO     If yes, where, when, and reason for visit? Have you seen or consulted any other health care providers outside of the [address] [place] [address]  since your last visit? YES   [place] [facility]   If yes, where, when, and reason for visit? Ms. Saavedra has a reminder for a \"due or due soon\" health maintenance. I have asked that she contact her primary care provider for follow-up on this health maintenance.

## 2018-10-03 NOTE — MR AVS SNAPSHOT
[address] [facility] [address] 
[phone] Patient: Bryan Lee MRN: [id number] ICI:0/93/6904 Visit Information Date & Time Provider Department Dept. Phone Encounter #  
 10/3/2018  8:20 AM Pat Monk [address] [place] [address] for Pain Management [phone] Follow-up Instructions Return in about 3 months (around 1/3/2019). Your Appointments 12/20/2018  3:00 PM  
Office Visit with KAIT Ann [address] [place] [address] for Pain Management (ALAYNA SCHEDULING) Appt Note: 3 mon f/u per rc. ...to  
 [facility] [address]  
[phone] [facility] [phone] Upcoming Health Maintenance Date Due DTaP/Tdap/Td series (1 - Tdap) 3/17/1993 PAP AKA CERVICAL CYTOLOGY 5/14/2016 Influenza Age 5 to Adult 8/1/2018 Allergies as of 10/3/2018  Review Complete On: 10/3/2018 By: KAIT Rogel Severity Noted Reaction Type Reactions Wellbutrin [Bupropion Hcl]  09/09/2016    Other (comments)  
 manic Current Immunizations  Never Reviewed No immunizations on file. Not reviewed this visit You Were Diagnosed With   
  
 Codes Comments Encounter for long-term (current) use of high-risk medication    -  Primary ICD-10-CM: O11.296 ICD-9-CM: V58.69 Chronic midline low back pain without sciatica     ICD-10-CM: M54.5, G89.29 ICD-9-CM: 724.2, 338.29 Chronic pain syndrome     ICD-10-CM: G89.4 ICD-9-CM: 338. 4 Myofascial pain dysfunction syndrome     ICD-10-CM: M79.18 ICD-9-CM: 729.1 Cervical radiculopathy     ICD-10-CM: M54.12 
ICD-9-CM: 723.4 DDD (degenerative disc disease), cervical     ICD-10-CM: M50.30 ICD-9-CM: 722.4 DDD (degenerative disc disease), lumbar     ICD-10-CM: M51.36 
ICD-9-CM: 722.52 Cervicalgia     ICD-10-CM: M54.2 ICD-9-CM: 723.1 Vitals BP Pulse Temp Resp Height(growth percentile) Weight(growth percentile) 123/83 (BP 1 Location: Right arm, BP Patient Position: Sitting) 81 97 °F (36.1 °C) 14 5' 9\" (1.753 m) 240 lb (108.9 kg) BMI OB Status Smoking Status 35.44 kg/m2 Having regular periods Former Smoker BMI and BSA Data Body Mass Index Body Surface Area  
 35.44 kg/m 2 2.3 m 2 Preferred Pharmacy Pharmacy Name Phone [address] [facility] #  [phone] Your Updated Medication List  
  
   
This list is accurate as of 10/3/18  8:43 AM.  Always use your most recent med list.  
  
  
  
  
 EFFEXOR  mg capsule Generic drug:  venlafaxine-SR Take 150 mg by mouth daily. ibuprofen 200 mg tablet Commonly known as:  MOTRIN Take 800 mg by mouth every eight (8) hours as needed for Pain. LOESTRIN FE 1/20 (28-DAY) PO Take  by mouth.  
  
 metFORMIN 500 mg tablet Commonly known as:  GLUCOPHAGE Take 500 mg by mouth two (2) times a day. * morphine CR 15 mg CR tablet Commonly known as:  MS CONTIN Take 1 Tab by mouth every twelve (12) hours for 30 days. Max Daily Amount: 30 mg. Start taking on:  10/26/2018 * morphine CR 15 mg CR tablet Commonly known as:  MS CONTIN Take 1 Tab by mouth daily for 30 days. Max Daily Amount: 15 mg. Start taking on:  11/25/2018 * naloxone 4 mg/actuation nasal spray Commonly known as:  NARCAN  
1 Colony by IntraNASal route as needed. Indications: OPIATE-INDUCED RESPIRATORY DEPRESSION  
  
 * naloxone 2 mg/0.4 mL auto-injector Commonly known as:  EVZIO  
0.4 mL by IntraMUSCular route once as needed for Overdose.  
  
 naproxen 500 mg tablet Commonly known as:  NAPROSYN Take 500 mg by mouth two (2) times daily (with meals). NexIUM 40 mg capsule Generic drug:  esomeprazole Take 40 mg by mouth daily. * oxyCODONE-acetaminophen 7.5-325 mg per tablet Commonly known as:  PERCOCET Take 1 Tab by mouth three (3) times daily as needed for Pain for up to 30 days. Max Daily Amount: 3 Tabs. Indications: Pain Start taking on:  10/26/2018 * oxyCODONE-acetaminophen 7.5-325 mg per tablet Commonly known as:  PERCOCET Take 1 Tab by mouth three (3) times daily as needed for Pain for up to 30 days. Max Daily Amount: 3 Tabs. Indications: Pain Start taking on:  11/25/2018 * oxyCODONE-acetaminophen 7.5-325 mg per tablet Commonly known as:  PERCOCET Take 1 Tab by mouth four (4) times daily as needed for Pain for up to 30 days. Max Daily Amount: 4 Tabs. Indications: Pain Start taking on:  12/23/2018 SUMAtriptan 50 mg tablet Commonly known as:  IMITREX Take 50 mg by mouth two (2) times a day. topiramate 100 mg tablet Commonly known as:  TOPAMAX Take 50 mg by mouth two (2) times a day. 50 in am and 100 in pm  
  
 traZODone 100 mg tablet Commonly known as:  Rachael Pamela Take 100 mg by mouth nightly. TREXIMET  mg tablet Generic drug:  SUMAtriptan-naproxen Take 1 Tab by mouth once as needed. ZANAFLEX 4 mg tablet Generic drug:  tiZANidine Take 4 mg by mouth every six (6) hours as needed. * Notice: This list has 7 medication(s) that are the same as other medications prescribed for you. Read the directions carefully, and ask your doctor or other care provider to review them with you. Prescriptions Printed Refills  
 morphine CR (MS CONTIN) 15 mg CR tablet 0 Starting on: 10/26/2018 Sig: Take 1 Tab by mouth every twelve (12) hours for 30 days. Max Daily Amount: 30 mg.  
 Class: Print Route: Oral  
 morphine CR (MS CONTIN) 15 mg CR tablet 0 Starting on: 11/25/2018 Sig: Take 1 Tab by mouth daily for 30 days. Max Daily Amount: 15 mg.  
 Class: Print Route: Oral  
 oxyCODONE-acetaminophen (PERCOCET) 7.5-325 mg per tablet 0 Starting on: 10/26/2018 Sig: Take 1 Tab by mouth three (3) times daily as needed for Pain for up to 30 days. Max Daily Amount: 3 Tabs. Indications: Pain Class: Print Route: Oral  
 oxyCODONE-acetaminophen (PERCOCET) 7.5-325 mg per tablet 0 Starting on: 11/25/2018 Sig: Take 1 Tab by mouth three (3) times daily as needed for Pain for up to 30 days. Max Daily Amount: 3 Tabs. Indications: Pain Class: Print Route: Oral  
 oxyCODONE-acetaminophen (PERCOCET) 7.5-325 mg per tablet 0 Starting on: 12/23/2018 Sig: Take 1 Tab by mouth four (4) times daily as needed for Pain for up to 30 days. Max Daily Amount: 4 Tabs. Indications: Pain Class: Print Route: Oral  
  
We Performed the Following AMB POC DRUG SCREEN () [ [facility]] DRUG SCREEN [[id number] Custom] Follow-up Instructions Return in about 3 months (around 1/3/2019). Patient Instructions 1. Modify current plan with no evidence of addiction or diversion. Stable on current medication without adverse events. 2. Refill oxycodone 7.5/325 mg up to 3 times daily as needed pain times 2 months, then adjust up to 4 times daily as needed until next visit. 3. Refill Morphine ER 15 mg every 12 hours times 1 month, then adjust down to 15 mg once daily times 1 month, then discontinue 4. Continue OTC ibuprofen as needed. With food only. 5. Continue to follow-up with orthopedic surgeon regarding recent shoulder surgery. 6. Naloxone 4 mg injector for opioid induced respiratory depression emergency only. 7. Discussed risks of addiction, dependency, and opioid induced hyperalgesia. Please remember to call at least 5 business days prior to your medication refill. Return to clinic in 3 months. Please call and cancel your appointment and pain management agreement if you do decide to transfer your care. Introducing [facility]! Dear Cole Harden: Thank you for requesting a gdgt account.   Our records indicate that you already have an active [employer] account. You can access your account anytime at [email or web address] Did you know that you can access your hospital and ER discharge instructions at any time in [employer]? You can also review all of your test results from your hospital stay or ER visit. Additional Information If you have questions, please visit the Frequently Asked Questions section of the [employer] website at [email or web address] Remember, [employer] is NOT to be used for urgent needs. For medical emergencies, dial 911. Now available from your iPhone and Android! Please provide this summary of care documentation to your next provider. Your primary care clinician is listed as Ledy Blanco. If you have any questions after today's visit, please call [phone].

## 2018-11-19 ENCOUNTER — TELEPHONE (OUTPATIENT)
Dept: PAIN MANAGEMENT | Age: 48
End: 2018-11-19

## 2018-11-19 NOTE — TELEPHONE ENCOUNTER
[facility] has called requesting a refill of their controlled medication, morphine sulfate ER and percocet, for the management of her chronic neck and back pain. Last office visit date: 10/03/18  Last opioid care agreement 06/18/18  Last UDS was done 10/03/18    Date last  was pulled and reviewed : 11/19/18, last fill date for both meds was 10/30/18. Was the patient compliant when the above report was pulled? yes    Analgesia: pt reports a 90% pain relief with her current opioid medication regimen     Aberrancies: none noted    ADL's: pt reports that she is able to perform her normal daily tasks because she is taking her medication. Adverse Reaction: none reported by the pt at this time. Provider's last note and plan of care reviewed? Yes, pre-printed prescriptions  Request forwarded to provider for review.

## 2018-11-21 NOTE — TELEPHONE ENCOUNTER
Attempted to contact the pt about her refill request. She was not able to be reached on any of the three numbers listed. A message was left on the [phone] number. There was pt identification. She was asked to contact the office at her earliest convenience regarding her recent request. The number to the office was provided for the pt. The [phone] number has been disconnected and the [phone] number is not accepting calls at this time.

## 2018-11-26 NOTE — TELEPHONE ENCOUNTER
Pt called the office back about an hour after a message was left. She was informed by the  staff that her prescription was ready for .

## 2018-11-27 ENCOUNTER — TELEPHONE (OUTPATIENT)
Dept: PAIN MANAGEMENT | Age: 48
End: 2018-11-27

## 2018-11-27 NOTE — TELEPHONE ENCOUNTER
Attempted to contact the pt about her issue. She was not able to be reached. A message was left for the pt and she was asked to contact the office back at her earliest convenience. The number to the nurse triage line and the office were provided for the pt. There was pt identification on the voicemail.

## 2018-11-29 NOTE — TELEPHONE ENCOUNTER
Patient called in to nurse triage line to report that she was unable to fill her prescription dated 10/03 at the pharmacy as it was over 27days old. States she came in and spoke to the lady at the desk about this Tuesday. She stated she is asking for a new prescription and understands she would need to bring the old script in to exchange it, she requested a call back and that if she does not answer, it's because she is busy at work.

## 2018-11-29 NOTE — TELEPHONE ENCOUNTER
Attempted to contact pt about her issue. She was not able to be reached. A message was left for the pt asking her to contact the office if she still needs our assistance. The number to the office was provided. There was pt identification on the voicemail.

## 2018-11-30 NOTE — TELEPHONE ENCOUNTER
I was able to get in contact with the pt. The pt was identified using 2 pt identifiers. She states that she was having trouble getting her prescriptions filled because she was out of state in [place] for work. She is now on her way back home and will try getting them filled locally. The pt was encouraged to contact the office if she has any difficulty with this. She states that she was able to stretch her medication to make it back home. No questions or concerns from the pt at this time.

## 2018-11-30 NOTE — TELEPHONE ENCOUNTER
Pt's pharmacy will not fill either prescription because scripts were written over 30 days ago. Pt is just asking for prescriptions to be re-printed to fill. Please advise.

## 2018-12-27 ENCOUNTER — OFFICE VISIT (OUTPATIENT)
Dept: PAIN MANAGEMENT | Age: 48
End: 2018-12-27

## 2018-12-27 VITALS
HEIGHT: 69 IN | DIASTOLIC BLOOD PRESSURE: 82 MMHG | BODY MASS INDEX: 35.55 KG/M2 | OXYGEN SATURATION: 97 % | WEIGHT: 240 LBS | HEART RATE: 77 BPM | SYSTOLIC BLOOD PRESSURE: 124 MMHG | RESPIRATION RATE: 16 BRPM | TEMPERATURE: 97.5 F

## 2018-12-27 DIAGNOSIS — G89.4 CHRONIC PAIN SYNDROME: ICD-10-CM

## 2018-12-27 DIAGNOSIS — Z79.899 ENCOUNTER FOR LONG-TERM (CURRENT) USE OF HIGH-RISK MEDICATION: Primary | ICD-10-CM

## 2018-12-27 DIAGNOSIS — M50.30 DDD (DEGENERATIVE DISC DISEASE), CERVICAL: ICD-10-CM

## 2018-12-27 DIAGNOSIS — M51.36 DDD (DEGENERATIVE DISC DISEASE), LUMBAR: ICD-10-CM

## 2018-12-27 DIAGNOSIS — M54.12 CERVICAL RADICULOPATHY: ICD-10-CM

## 2018-12-27 DIAGNOSIS — M54.50 CHRONIC MIDLINE LOW BACK PAIN WITHOUT SCIATICA: ICD-10-CM

## 2018-12-27 DIAGNOSIS — G89.29 CHRONIC MIDLINE LOW BACK PAIN WITHOUT SCIATICA: ICD-10-CM

## 2018-12-27 DIAGNOSIS — M54.2 CERVICALGIA: ICD-10-CM

## 2018-12-27 RX ORDER — OXYCODONE AND ACETAMINOPHEN 7.5; 325 MG/1; MG/1
1 TABLET ORAL
Qty: 90 TAB | Refills: 0 | Status: SHIPPED | OUTPATIENT
Start: 2018-12-29 | End: 2019-01-24 | Stop reason: SDUPTHER

## 2018-12-27 RX ORDER — TIZANIDINE 4 MG/1
4 TABLET ORAL
Qty: 30 TAB | Refills: 2 | Status: SHIPPED | OUTPATIENT
Start: 2018-12-27 | End: 2019-03-21 | Stop reason: SDUPTHER

## 2018-12-27 RX ORDER — MORPHINE SULFATE 10 MG/1
10 CAPSULE, EXTENDED RELEASE ORAL DAILY
Qty: 30 CAP | Refills: 0 | Status: SHIPPED | OUTPATIENT
Start: 2018-12-29 | End: 2019-01-28

## 2018-12-27 NOTE — PATIENT INSTRUCTIONS
Safe Use of Opioid Pain Medicine: Care Instructions  Your Care Instructions  Pain is your body's way of warning you that something is wrong. Pain feels different for everybody. Only you can describe your pain. A doctor can suggest or prescribe many types of medicines for pain. These range from over-the-counter medicines like acetaminophen (Tylenol) to powerful medicines called opioids. Examples of opioids are fentanyl, hydrocodone, morphine, and oxycodone. Heroin is an illegal opioid  Opioids are strong medicines. They can help you manage pain when you use them the right way. But if you misuse them, they can cause serious harm and even death. For these reasons, doctors are very careful about how they prescribe opioids. If you decide to take opioids, here are some things to remember. · Keep your doctor informed. You can get addicted to opioids. The risk is higher if you have a history of substance use. Your doctor will monitor you closely for signs of misuse and addiction and to figure out when you no longer need to take opioids. · Make a treatment plan. The goal of your plan is to be able to function and do the things you need to do, even if you still have some pain. You might be able to manage your pain with other non-opioid options like physical therapy, relaxation, or over-the-counter pain medicines. · Be aware of the side effects. Opioids can cause serious side effects, such as constipation, dry mouth, and nausea. And over time, you may need a higher dose to get pain relief. This is called tolerance. Your body also gets used to opioids. This is called physical dependence. If you suddenly stop taking them, you may have withdrawal symptoms. The doctor carefully considered what pain medicine is right for you. You may not have received opioids if your doctor was concerned about drug interactions or your safety, or if he or she had other concerns. It is best to have one doctor or clinic treat your pain. This way you will get the pain medicine that will help you the most. And a doctor will be able to watch for any problems that the medicine might cause. The doctor has checked you carefully, but problems can develop later. If you notice any problems or new symptoms,  get medical treatment right away. Follow-up care is a key part of your treatment and safety. Be sure to make and go to all appointments, and call your doctor if you are having problems. It's also a good idea to know your test results and keep a list of the medicines you take. How can you care for yourself at home? · If you need to take opioids to manage your pain, remember these safety tips. ? Follow directions carefully. It's easy to misuse opioids if you take a dose other than what's prescribed by your doctor. This can lead to overdose and even death. Even sharing them with someone they weren't meant for is misuse. ? Be cautious. Opioids may affect your judgment and decision making. Do not drive or operate machinery until you can think clearly. Talk with your doctor about when it is safe to drive. ? Reduce the risk of drug interactions. Opioids can be dangerous if you take them with alcohol or with certain drugs like sleeping pills and muscle relaxers. Make sure your doctor knows about all the other medicines you take, including over-the-counter medicines. Don't start any new medicines before you talk to your doctor or pharmacist.  ? Keep others safe. Store opioids in a safe and secure place. Make sure that pets, children, friends, and family can't get to them. When you're done using opioids, make sure to properly dispose of them. You can either use a community drug take-back program or your drugstore's mail-back program. If one of these programs isn't available, you can flush opioid skin patches and unused opioid pills down the toilet. ? Reduce the risk of overdose. Misuse of opioids can be very dangerous.  Protect yourself by asking your doctor about a naloxone rescue kit. It can help you--and even save your life--if you take too much of an opioid. · Try other ways to reduce pain. ? Relax, and reduce stress. Relaxation techniques such as deep breathing or meditation can help. ? Keep moving. Gentle, daily exercise can help reduce pain over the long run. Try low- or no-impact exercises such as walking, swimming, and stationary biking. Do stretches to stay flexible. ? Try heat, cold packs, and massage. ? Get enough sleep. Pain can make you tired and drain your energy. Talk with your doctor if you have trouble sleeping because of pain. ? Think positive. Your thoughts can affect your pain level. Do things that you enjoy to distract yourself when you have pain instead of focusing on the pain. See a movie, read a book, listen to music, or spend time with a friend. · If you are not taking a prescription pain medicine, ask your doctor if you can take an over-the-counter medicine. When should you call for help? Call your doctor now or seek immediate medical care if:    · You have a new kind of pain.     · You have new symptoms, such as a fever or rash, along with the pain.    Watch closely for changes in your health, and be sure to contact your doctor if:    · You think you might be using too much pain medicine, and you need help to use less or stop.     · Your pain gets worse.     · You would like a referral to a doctor or clinic that specializes in pain management. Where can you learn more? Go to http://jackie-yair.info/. Enter R108 in the search box to learn more about \"Safe Use of Opioid Pain Medicine: Care Instructions. \"  Current as of: September 10, 2017  Content Version: 11.8  © 6042-6405 [employer]. Care instructions adapted under license by [employer] (which disclaims liability or warranty for this information).  If you have questions about a medical condition or this instruction, always ask your healthcare professional. [place] [address] any warranty or liability for your use of this information.

## 2018-12-27 NOTE — PROGRESS NOTES
Nursing Notes    Patient presents to the office today in follow-up. Patient rates her pain at 1/10 on the numerical pain scale. Reviewed medications with counts as follows:    Rx Date filled Qty Dispensed Pill Count Last Dose Short   Morphine 15 mg  ER 11/30/18 30 4 yesterday no   Percocet 7.5 mg 11/30/18 90 10 today no       Last opioid agreement 06/18/18  Last urine drug screen 10/03/18    Comments:  Patient is here today for a follow up appt today she states her pain level today is a 1   Patient is following up for her low back and neck pain. She states she has seen her pcm since her last appt here     POC UDS was not performed in office today    Any new labs or imaging since last appointment? NO    Have you been to an emergency room (ER) or urgent care clinic since your last visit? NO            Have you been hospitalized since your last visit? NO     If yes, where, when, and reason for visit? Have you seen or consulted any other health care providers outside of the [address] [place] [address]  since your last visit? NO     If yes, where, when, and reason for visit? Ms. Saavedra has a reminder for a \"due or due soon\" health maintenance. I have asked that she contact her primary care provider for follow-up on this health maintenance.

## 2018-12-27 NOTE — PROGRESS NOTES
Referral date 9/12/16, source ? and for polyarticular pain including cervical and lumbar spine with radiation. HPI:  Ashanti Huff is a 57 y.o. female here for f/u visit for ongoing evaluation of chronic neck and lower back pain. Pt was last seen here on 10/3/18. Pt denies interval changes on the character or distribution of pain. Pain is located cervical region described as aching. Pain is located lumbosacral region described as burning to aching to stabbing. Pain at its best is 3/10. Pain at its worse is 8/10. The pain is worsened by prolonged standing and standing on concrete. Symptoms are improved by rest, Naproxen \"when headache comes from neck\", Zanaflex in the past, SIJ and cervical steroid injections in the past, lower back trigger point injections in the past, Lidocaine patches and heat. Never had PT for neck or back. Knee PT ~1 year ago with good improvement. Pt has tried TENS unit with no perceived benefit. She has not tried aquatic PT, acupuncture therapy, yoga therapy, chiuropractor, massage therapy, implantable pain pump, SCS trial, RFA, Gabapentin, Lyrica or TCA. Allergy to Wellbutrin and is therefore unable to take Cymbalta stating \"that would do the same thing\". Stopped taking Topamax for migraines due to it affecting her memory. Since last visit, pt went to [employer] [place] [address] which seems to aggravate her pain.       Social History     Socioeconomic History    Marital status:      Spouse name: Not on file    Number of children: Not on file    Years of education: Not on file    Highest education level: Not on file   Social Needs    Financial resource strain: Not on file    Food insecurity - worry: Not on file    Food insecurity - inability: Not on file    Transportation needs - medical: Not on file   [employer] needs - non-medical: Not on file   Occupational History    Not on file   Tobacco Use    Smoking status: Former Smoker    Smokeless tobacco: Current User    Tobacco comment: pt using vapor cigarettes with 6% nicotine   Substance and Sexual Activity    Alcohol use: No    Drug use: No    Sexual activity: Not on file   Other Topics Concern    Not on file   Social History Narrative    Not on file     No family history on file. Allergies   Allergen Reactions    Wellbutrin [Bupropion Hcl] Other (comments)     manic     Past Medical History:   Diagnosis Date    Depression     Headache     Hypoglycemia     Insomnia     Tinnitus      Past Surgical History:   Procedure Laterality Date    HX CHOLECYSTECTOMY  2010    HX HEENT      adenoids with tube placemen , age 13   [facility] Right [phone]    arthroscopic shoulder    HX ORTHOPAEDIC Left [id number]    foot repair after break , bone spur removal     Current Outpatient Medications on File Prior to Visit   Medication Sig    naproxen (NAPROSYN) 500 mg tablet Take 500 mg by mouth two (2) times daily (with meals).  SUMAtriptan (IMITREX) 50 mg tablet Take 50 mg by mouth two (2) times a day.  naloxone (EVZIO) 2 mg/0.4 mL auto-injector 0.4 mL by IntraMUSCular route once as needed for Overdose.  traZODone (DESYREL) 100 mg tablet Take 100 mg by mouth nightly.  venlafaxine-SR (EFFEXOR XR) 150 mg capsule Take 150 mg by mouth daily.  ibuprofen (MOTRIN) 200 mg tablet Take 800 mg by mouth every eight (8) hours as needed for Pain.  naloxone (NARCAN) 4 mg/actuation nasal spray 1 [place] by IntraNASal route as needed. Indications: OPIATE-INDUCED RESPIRATORY DEPRESSION    SUMAtriptan-naproxen (TREXIMET)  mg tablet Take 1 Tab by mouth once as needed. No current facility-administered medications on file prior to visit.          ROS:  Denies fever, chills, nausea, vomiting, diarrhea, constipation, abdominal pain, chest pain, shortness or breath/trouble breathing, weakness, trouble swallowing, changes in vision, changes in hearing, falls, dizziness, bladder incontinence, bowel incontinence, depression, anxiety, suicidal ideations, homicidal ideations or alcohol use. Opioid specific risk: GERD, obesity, insomnia. Vitals:    12/27/18 1551   BP: 124/82   Pulse: 77   Resp: 16   Temp: 97.5 °F (36.4 °C)   SpO2: 97%   Weight: 108.9 kg (240 lb)   Height: 5' 9\" (1.753 m)   PainSc:   1   PainLoc: Neck        Imaging:  MRI Cervical Spine WO Contrast 7/8/17  \"\"IMPRESSION: Mild multilevel disc and facet degenerative change. Minimal bilateral foraminal stenosis at C6-7. \"\"    Lumbar Spine W/Bend or Ext Views 9/6/13  \"\"Impression: Normal alignment.  No significant osseous abnormality. \"\"    Labs:   BUN/Cr: 11/0.8 on 3/15/16  AST/ALT: 37/23 on 3/15/16      Physical exam:  AFVSS, no acute distress, obese body habitus. A&OXs 3. Normocephalic, atraumatic. Conjugate gaze, clear sclerae. Speech is clear and appropriate. Mood is appropriate and patient is cooperative. Gait and balance are within functional limits. Non-labored breathing. LE:   Full AROM lumbar flexion without reproduction of primary pain. Full AROM lumbar extension with reproduction of primary pain. Strength for right lower extremity is 5/5 for hip flexion, knee extension, dorsiflexion, extensor hallucis longus, plantar flexion. Strength for left lower extremity is 5/5 for hip flexion, knee extension, dorsiflexion, extensor hallucis longus, plantar flexion. Sensation to light touch is intact for right L2-S2. Sensation to light touch is intact for left L2-S2. Negative ankle clonus on the right and the left. Negative seated straight leg raise bilaterally. Negative supine straight leg raise bilaterally. Negative Stinchfield's on the right and a left. Negative FABERs on the right and the left. TTP bilateral SIJ regions. UE:   Full AROM cervical flexion without reproduction of primary pain. Full AROM cervical extension with reproduction of primary pain.   Full AROM cervical rotation without reproduction of primary pain. Full AROM cervical lateral bending with reproduction of primary pain. Strength for right upper extremity is 5/5 for shoulder abduction, elbow flexion, wrist extension, elbow extension, finger abduction, flexor digitorum profundus to digits 2 through 5. Strength for left upper extremity is 5/5 for shoulder abduction, elbow flexion, wrist extension, elbow extension, finger abduction, flexor digitorum profundus to digits 2 through 5. Sensation to light touch is intact for left C4-T1. Sensation to light touch is intact for right C4-T1. TTP bilateral trapezius muscles. Calculated MEQ - 48.8  Naloxone rescue - yes  Prophylactic bowel program - no  Date of last OCA 6/18/18  Last UDS 10/3/18, consistent   date checked today, findings consistent    Primary Care Physician  Erica Chavez  [address] [place] [id number]  [phone]    Today  PGIC - 5 & 2  KAYA - 14%  COMM - 3    PHQ -- . PHQ over the last two weeks 12/27/2018   PHQ Not Done -   Little interest or pleasure in doing things Not at all   Feeling down, depressed, irritable, or hopeless Not at all   Total Score PHQ 2 0         Assessment/Plan:     ICD-10-CM ICD-9-CM    1. Encounter for long-term (current) use of high-risk medication Z79.899 V58.69    2. Chronic midline low back pain without sciatica M54.5 724.2 tiZANidine (ZANAFLEX) 4 mg tablet    G89.29 338.29 oxyCODONE-acetaminophen (PERCOCET) 7.5-325 mg per tablet      morphine (KIET) 10 mg SR capsule   3. Chronic pain syndrome G89.4 338.4 tiZANidine (ZANAFLEX) 4 mg tablet      oxyCODONE-acetaminophen (PERCOCET) 7.5-325 mg per tablet      morphine (KIET) 10 mg SR capsule   4. Cervical radiculopathy M54.12 723.4 tiZANidine (ZANAFLEX) 4 mg tablet      oxyCODONE-acetaminophen (PERCOCET) 7.5-325 mg per tablet      morphine (KIET) 10 mg SR capsule   5.  DDD (degenerative disc disease), cervical M50.30 722.4 tiZANidine (ZANAFLEX) 4 mg tablet oxyCODONE-acetaminophen (PERCOCET) 7.5-325 mg per tablet      morphine (KIET) 10 mg SR capsule   6. DDD (degenerative disc disease), lumbar M51.36 722.52 tiZANidine (ZANAFLEX) 4 mg tablet      oxyCODONE-acetaminophen (PERCOCET) 7.5-325 mg per tablet      morphine (KIET) 10 mg SR capsule   7. Cervicalgia M54.2 723.1 tiZANidine (ZANAFLEX) 4 mg tablet      oxyCODONE-acetaminophen (PERCOCET) 7.5-325 mg per tablet      morphine (KIET) 10 mg SR capsule        Do not recommend long term opioid therapy for this patient at this time. Pt currently taking Morphine ER 15mg daily and Percocet 7.5/325mg up to 3 times a day as needed with a total of 90 tabs to be used over 30 days. Their MME is 48.8. Today, we will continue the weaning of patients opioid medication with a goal of being opioid free, pending safety and compliance. Pt instructed to call if they experience any signs of withdrawal (diarrhea, nausea, vomiting, sweating or chills, agitation, itching). Today, pt given prescription for Morphine ER 10mg daily and Percocet 7.5/325mg up to 3 times a day as needed with a total of 90 tabs to be used over 30 days. Their new MME is 43.8. Will address short-acting opioid once extended release has been discontinued. Pt instructed to call 5-7 days before they run out of their medications for refill. At this time pt will be provided with Percocet 7.5/325mg up to 4 times a day as needed with a total of 100 tabs to be budgeted over 30 days pending safety and compliance. At following refill, pt will be provided with Percocet 7.5/325mg up to 3 times a day as needed with a total of 90 tabs to be used over 30 days pending safety and compliance. Their new MME will be 33.8. At next office visit, the plan is to provide patient with Percocet 7.5/325mg up to 3 times a day as needed with a total of 80 tabs to be budgeted over 30 days.  If patient has difficulty with the wean or difficulty with cravings we will consider referral to mental health for ongoing assessment and treatment for opioid use disorder. Trial of Zanaflex ordered today. Continue Lidocaine patches as needed. Try OTC capsaicin cream.  Consider aquatic PT, acupuncture therapy, yoga therapy and/or massage therapy in the future. Consider tylenol optimization at next office visit. Consider ordering PT or aquatic PT at next visit as patient has never had this for her neck or back. Consider interventional pain procedures with Dr Andrzej Lazcano in the future (SIJ and/or cervical steroid injections or trigger point injections). NSAID prn with food. Follow up ongoing assessment and ongoing development of integrative and comprehensive plan of care for chronic pain. Goals: To establish complementary and integrative plan of care to address chronic pain issues while minimizing pharmaceuticals to maximize patient's function improve quality of life. Education:  Patient again educated on the importance of strict compliance with the opioid care agreement while on opioid therapy. Patient also again educated that they should avoid driving while on chronic opioid therapy. Also advised to avoid alcohol and to avoid benzodiazepines while on opioid therapy. Handouts given regarding opioid safety. Follow-up Disposition:  Return in about 3 months (around 3/27/2019) for 30 min. [address] was used for portions of this report. Unintended errors may occur.

## 2019-01-24 DIAGNOSIS — M54.50 CHRONIC MIDLINE LOW BACK PAIN WITHOUT SCIATICA: ICD-10-CM

## 2019-01-24 DIAGNOSIS — G89.4 CHRONIC PAIN SYNDROME: ICD-10-CM

## 2019-01-24 DIAGNOSIS — M50.30 DDD (DEGENERATIVE DISC DISEASE), CERVICAL: ICD-10-CM

## 2019-01-24 DIAGNOSIS — M54.12 CERVICAL RADICULOPATHY: ICD-10-CM

## 2019-01-24 DIAGNOSIS — M54.2 CERVICALGIA: ICD-10-CM

## 2019-01-24 DIAGNOSIS — G89.29 CHRONIC MIDLINE LOW BACK PAIN WITHOUT SCIATICA: ICD-10-CM

## 2019-01-24 DIAGNOSIS — M51.36 DDD (DEGENERATIVE DISC DISEASE), LUMBAR: ICD-10-CM

## 2019-01-24 RX ORDER — MORPHINE SULFATE 10 MG/1
10 CAPSULE, EXTENDED RELEASE ORAL DAILY
Qty: 30 CAP | Refills: 0 | Status: CANCELLED | OUTPATIENT
Start: 2019-01-24 | End: 2019-02-23

## 2019-01-24 RX ORDER — OXYCODONE AND ACETAMINOPHEN 7.5; 325 MG/1; MG/1
1 TABLET ORAL
Qty: 100 TAB | Refills: 0 | Status: SHIPPED | OUTPATIENT
Start: 2019-01-28 | End: 2019-02-21 | Stop reason: SDUPTHER

## 2019-01-24 NOTE — TELEPHONE ENCOUNTER
Morphine daily discontinued. I fixed script, it should have read 1 tablet by mouth 4 times daily as needed for pain (100 tabs to be budgeted over 30 days). Thanks.

## 2019-01-24 NOTE — TELEPHONE ENCOUNTER
Pt called in for a prescription refill on 1/24/19 current medication runs out on 1/28/19. Please give pt a call at [phone] when the prescription is ready for . Relief:90%  Daily Task:Yes  Side Effects:No    Last appt: 12/27/2018  Future Appointments   Date Time Provider Yulia Lau   3/21/2019  1:00 PM Bernarda Linares PA [facility] SCHED       Requested Prescriptions     Pending Prescriptions Disp Refills    oxyCODONE-acetaminophen (PERCOCET) 7.5-325 mg per tablet 90 Tab 0     Sig: Take 1 Tab by mouth three (3) times daily as needed for Pain for up to 30 days. Max Daily Amount: 3 Tabs.  morphine (KIET) 10 mg SR capsule 30 Cap 0     Sig: Take 10 mg by mouth daily for 30 days. Max Daily Amount: 10 mg.

## 2019-01-24 NOTE — TELEPHONE ENCOUNTER
[facility] has called requesting a refill of their controlled medication  for the management of chronic pain syndrome     Last office visit date: 12/27/18  Last opioid care agreement 06/18/18  Last UDS was done 10/03/18    Date last  was pulled and reviewed : 01/24/19  Last fill date for medication was 12/30/18    Was the patient compliant when the above report was pulled? yes    Analgesia: The patient states that she receives 90% of pain relief from the medication      Am I correct that the Morphine is D/C   Aberrancies: There are no aberrancies noted at this time     ADL's: The patient states that she is able to perform her adls while on the medications     Adverse Reaction: There are no adverse reactions noted at this time     Provider's last note and plan of care reviewed? yes  Request forwarded to provider for review.

## 2019-02-21 DIAGNOSIS — M54.50 CHRONIC MIDLINE LOW BACK PAIN WITHOUT SCIATICA: ICD-10-CM

## 2019-02-21 DIAGNOSIS — G89.4 CHRONIC PAIN SYNDROME: ICD-10-CM

## 2019-02-21 DIAGNOSIS — M54.12 CERVICAL RADICULOPATHY: ICD-10-CM

## 2019-02-21 DIAGNOSIS — G89.29 CHRONIC MIDLINE LOW BACK PAIN WITHOUT SCIATICA: ICD-10-CM

## 2019-02-21 DIAGNOSIS — M54.2 CERVICALGIA: ICD-10-CM

## 2019-02-21 DIAGNOSIS — M50.30 DDD (DEGENERATIVE DISC DISEASE), CERVICAL: ICD-10-CM

## 2019-02-21 DIAGNOSIS — M51.36 DDD (DEGENERATIVE DISC DISEASE), LUMBAR: ICD-10-CM

## 2019-02-21 RX ORDER — OXYCODONE AND ACETAMINOPHEN 7.5; 325 MG/1; MG/1
1 TABLET ORAL
Qty: 90 TAB | Refills: 0 | Status: SHIPPED | OUTPATIENT
Start: 2019-02-26 | End: 2019-03-21 | Stop reason: SDUPTHER

## 2019-02-21 NOTE — TELEPHONE ENCOUNTER
[facility] has called requesting a refill of their controlled medication, Oxycodone, for the management of chronic pain. Last office visit date: 12/27/19  Last opioid care agreement 6/18/18  Last UDS was done 10/3/18    Date last  was pulled and reviewed : 2/21/19  Last fill date for medication was 1/28/19    Was the patient compliant when the above report was pulled? yes    Analgesia: Patient reports 80% pain relief on current regimen. Aberrancies: No aberrancies noted in the last 30 days. ADL's: Patient states they are able to perform ADL's on current regimen. Adverse Reaction: Patient reports no adverse reactions at this time. Provider's last note and plan of care reviewed? yes  Request forwarded to provider for review.

## 2019-03-21 ENCOUNTER — OFFICE VISIT (OUTPATIENT)
Dept: PAIN MANAGEMENT | Age: 49
End: 2019-03-21

## 2019-03-21 VITALS
TEMPERATURE: 97.6 F | DIASTOLIC BLOOD PRESSURE: 82 MMHG | SYSTOLIC BLOOD PRESSURE: 126 MMHG | BODY MASS INDEX: 35.55 KG/M2 | HEART RATE: 84 BPM | WEIGHT: 240 LBS | HEIGHT: 69 IN | OXYGEN SATURATION: 97 % | RESPIRATION RATE: 16 BRPM

## 2019-03-21 DIAGNOSIS — M51.36 DDD (DEGENERATIVE DISC DISEASE), LUMBAR: ICD-10-CM

## 2019-03-21 DIAGNOSIS — G89.4 CHRONIC PAIN SYNDROME: ICD-10-CM

## 2019-03-21 DIAGNOSIS — M54.2 CERVICALGIA: ICD-10-CM

## 2019-03-21 DIAGNOSIS — M50.30 DDD (DEGENERATIVE DISC DISEASE), CERVICAL: ICD-10-CM

## 2019-03-21 DIAGNOSIS — Z79.899 ENCOUNTER FOR LONG-TERM (CURRENT) USE OF HIGH-RISK MEDICATION: ICD-10-CM

## 2019-03-21 DIAGNOSIS — M54.50 CHRONIC MIDLINE LOW BACK PAIN WITHOUT SCIATICA: Primary | ICD-10-CM

## 2019-03-21 DIAGNOSIS — M54.12 CERVICAL RADICULOPATHY: ICD-10-CM

## 2019-03-21 DIAGNOSIS — G89.29 CHRONIC MIDLINE LOW BACK PAIN WITHOUT SCIATICA: Primary | ICD-10-CM

## 2019-03-21 LAB
ALCOHOL UR POC: NORMAL
AMPHETAMINES UR POC: NEGATIVE
BARBITURATES UR POC: NORMAL
BENZODIAZEPINES UR POC: NEGATIVE
BUPRENORPHINE UR POC: NEGATIVE
CANNABINOIDS UR POC: NEGATIVE
CARISOPRODOL UR POC: NORMAL
COCAINE UR POC: NEGATIVE
FENTANYL UR POC: NORMAL
MDMA/ECSTASY UR POC: NORMAL
METHADONE UR POC: NEGATIVE
METHAMPHETAMINE UR POC: NORMAL
METHYLPHENIDATE UR POC: NORMAL
OPIATES UR POC: NEGATIVE
OXYCODONE UR POC: NORMAL
PHENCYCLIDINE UR POC: NORMAL
PROPOXYPHENE UR POC: NORMAL
TRAMADOL UR POC: NORMAL
TRICYCLICS UR POC: NORMAL

## 2019-03-21 RX ORDER — OXYCODONE AND ACETAMINOPHEN 7.5; 325 MG/1; MG/1
1 TABLET ORAL
Qty: 90 TAB | Refills: 0 | Status: SHIPPED | OUTPATIENT
Start: 2019-03-28 | End: 2019-04-22 | Stop reason: SDUPTHER

## 2019-03-21 RX ORDER — GABAPENTIN 100 MG/1
CAPSULE ORAL
Qty: 70 CAP | Refills: 0 | Status: SHIPPED | OUTPATIENT
Start: 2019-03-21 | End: 2019-04-23 | Stop reason: CLARIF

## 2019-03-21 RX ORDER — PREDNISONE 10 MG/1
10 TABLET ORAL AS NEEDED
COMMUNITY
Start: 2019-03-06 | End: 2019-05-24

## 2019-03-21 RX ORDER — TIZANIDINE 4 MG/1
4 TABLET ORAL
Qty: 30 TAB | Refills: 2 | Status: SHIPPED | OUTPATIENT
Start: 2019-03-21 | End: 2019-05-24 | Stop reason: SDUPTHER

## 2019-03-21 NOTE — PROGRESS NOTES
Nursing Notes    Patient presents to the office today in follow-up. Patient rates her pain at 3/10 on the numerical pain scale. Reviewed medications with counts as follows:    Rx Date filled Qty Dispensed Pill Count Last Dose Short   Percocet 7.5 mg 02/26/19 90 15 This am  no        reviewed YES  Any aberrancies noted on  NO  Last opioid agreement 06/18/18  Last urine drug screen 03/21/19    Comments:  Patient is here today for her follow up appt today for her chronic back and neck pain. She states her pain level today is a 3  PHQ 2 was done patient denies any depression. She states that she has seen her Ortho MD and an xray was done on her right shoulder   She was given Prednisone therapy for that. She states she has bone spurs in her arm     POC UDS was performed in office today per verbal order per [place]    Any new labs or imaging since last appointment? YES xray done at Ortho     Have you been to an emergency room (ER) or urgent care clinic since your last visit? NO            Have you been hospitalized since your last visit? NO     If yes, where, when, and reason for visit? Have you seen or consulted any other health care providers outside of the [address] [place] [address]  since your last visit? NO     If yes, where, when, and reason for visit? Ms. Saavedra has a reminder for a \"due or due soon\" health maintenance. I have asked that she contact her primary care provider for follow-up on this health maintenance.

## 2019-03-21 NOTE — PATIENT INSTRUCTIONS

## 2019-03-27 NOTE — PROGRESS NOTES
Referral date 9/12/16, source ? and for polyarticular pain including cervical and lumbar spine with radiation. HPI:  Kayla Martinez is a 54 y.o. female here for f/u visit for ongoing evaluation of chronic neck and lower back pain. Pt was last seen here on 12/27/18. Pt denies interval changes on the character or distribution of pain. Pain is located cervical region described as aching. Pain is located lumbosacral region described as burning to aching to stabbing. Pain at its best is 3/10. Pain at its worse is 8/10. The pain is worsened by prolonged standing and standing on concrete. Symptoms are improved by rest, Naproxen \"when headache comes from neck\", Zanaflex in the past, SIJ and cervical steroid injections in the past, lower back trigger point injections in the past, Lidocaine patches and heat. Never had PT for neck or back. Knee PT ~1 year ago with good improvement. Pt has tried TENS unit with no perceived benefit. She has not tried aquatic PT, acupuncture therapy, yoga therapy, chiuropractor, massage therapy, implantable pain pump, SCS trial, RFA, Gabapentin, Lyrica or TCA. Allergy to Wellbutrin and is therefore unable to take Cymbalta stating \"that would do the same thing\". Stopped taking Topamax for migraines due to it affecting her memory. Since last visit, pt went to her orthopedist beginning of March due to neck pain radiating to her right thumb in which she thought was a ganglion cyst she had removed in the past. She was told her neck has worsened and was given prednisone which helped. Trial of Zanaflex ordered at last visit is helping.       Social History     Socioeconomic History    Marital status:      Spouse name: Not on file    Number of children: Not on file    Years of education: Not on file    Highest education level: Not on file   Occupational History    Not on file   Social Needs    Financial resource strain: Not on file    Food insecurity:     Worry: Not on file     Inability: Not on file    Transportation needs:     Medical: Not on file     Non-medical: Not on file   Tobacco Use    Smoking status: Former Smoker    Smokeless tobacco: Current User    Tobacco comment: pt using vapor cigarettes with 6% nicotine   Substance and Sexual Activity    Alcohol use: No    Drug use: No    Sexual activity: Not on file   Lifestyle    Physical activity:     Days per week: Not on file     Minutes per session: Not on file    Stress: Not on file   Relationships    Social connections:     Talks on phone: Not on file     Gets together: Not on file     Attends Faith service: Not on file     Active member of club or organization: Not on file     Attends meetings of clubs or organizations: Not on file     Relationship status: Not on file    Intimate partner violence:     Fear of current or ex partner: Not on file     Emotionally abused: Not on file     Physically abused: Not on file     Forced sexual activity: Not on file   Other Topics Concern    Not on file   Social History Narrative    Not on file     No family history on file. Allergies   Allergen Reactions    Wellbutrin [Bupropion Hcl] Other (comments)     manic     Past Medical History:   Diagnosis Date    Depression     Headache     Hypoglycemia     Insomnia     Tinnitus      Past Surgical History:   Procedure Laterality Date    HX CHOLECYSTECTOMY  2010    HX HEENT      adenoids with tube placemen , age 13   [de-identified] ORTHOPAEDIC Right 1992,2006    arthroscopic shoulder    HX ORTHOPAEDIC Left [id number]    foot repair after break , bone spur removal     Current Outpatient Medications on File Prior to Visit   Medication Sig    predniSONE (STERAPRED DS) 10 mg dose pack Take 10 mg by mouth as needed.  naproxen (NAPROSYN) 500 mg tablet Take 500 mg by mouth two (2) times daily (with meals).  SUMAtriptan (IMITREX) 50 mg tablet Take 50 mg by mouth two (2) times a day.     naloxone (EVZIO) 2 mg/0.4 mL auto-injector 0.4 mL by IntraMUSCular route once as needed for Overdose.  ibuprofen (MOTRIN) 200 mg tablet Take 800 mg by mouth every eight (8) hours as needed for Pain.  naloxone (NARCAN) 4 mg/actuation nasal spray 1 [place] by IntraNASal route as needed. Indications: OPIATE-INDUCED RESPIRATORY DEPRESSION    traZODone (DESYREL) 100 mg tablet Take 100 mg by mouth nightly.  venlafaxine-SR (EFFEXOR XR) 150 mg capsule Take 150 mg by mouth daily.  SUMAtriptan-naproxen (TREXIMET)  mg tablet Take 1 Tab by mouth once as needed. No current facility-administered medications on file prior to visit. ROS:  Denies fever, chills, nausea, vomiting, diarrhea, constipation, abdominal pain, chest pain, shortness or breath/trouble breathing, weakness, trouble swallowing, changes in vision, changes in hearing, falls, dizziness, bladder incontinence, bowel incontinence, depression, anxiety, suicidal ideations, homicidal ideations or alcohol use. Opioid specific risk: GERD, obesity, insomnia. Vitals:    03/21/19 1249   BP: 126/82   Pulse: 84   Resp: 16   Temp: 97.6 °F (36.4 °C)   SpO2: 97%   Weight: 108.9 kg (240 lb)   Height: 5' 9\" (1.753 m)   PainSc:   3   PainLoc: Neck          Physical exam:  AFVSS, no acute distress, obese body habitus. A&OXs 3. Normocephalic, atraumatic. Conjugate gaze, clear sclerae. Speech is clear and appropriate. Mood is appropriate and patient is cooperative. Gait and balance are within functional limits. Non-labored breathing.       Calculated MEQ - 33.75  Naloxone rescue - yes  Prophylactic bowel program - no  Date of last OCA 6/18/18  Last UDS today, consistent POC, pending confirmatory testing  Prior UDS 10/3/18, consistent   date checked today, findings consistent    Primary Care Physician  Jabari Horton [facility], [address] [address] [phone]    Today   Last Visit  PGIC - 6 & 2  5 & 2  KAYA - 22%  14%  COMM - 4  3    PHQ -- .  3 most recent PHQ Screens 3/21/2019   PHQ Not Done -   Little interest or pleasure in doing things Not at all   Feeling down, depressed, irritable, or hopeless Not at all   Total Score PHQ 2 0         Assessment/Plan:     ICD-10-CM ICD-9-CM    1. Chronic midline low back pain without sciatica M54.5 724.2 tiZANidine (ZANAFLEX) 4 mg tablet    G89.29 338.29 gabapentin (NEURONTIN) 100 mg capsule      oxyCODONE-acetaminophen (PERCOCET) 7.5-325 mg per tablet   2. Cervical radiculopathy M54.12 723.4 tiZANidine (ZANAFLEX) 4 mg tablet      gabapentin (NEURONTIN) 100 mg capsule      oxyCODONE-acetaminophen (PERCOCET) 7.5-325 mg per tablet   3. DDD (degenerative disc disease), cervical M50.30 722.4 tiZANidine (ZANAFLEX) 4 mg tablet      gabapentin (NEURONTIN) 100 mg capsule      oxyCODONE-acetaminophen (PERCOCET) 7.5-325 mg per tablet   4. DDD (degenerative disc disease), lumbar M51.36 722.52 tiZANidine (ZANAFLEX) 4 mg tablet      gabapentin (NEURONTIN) 100 mg capsule      oxyCODONE-acetaminophen (PERCOCET) 7.5-325 mg per tablet   5. Cervicalgia M54.2 723.1 tiZANidine (ZANAFLEX) 4 mg tablet      gabapentin (NEURONTIN) 100 mg capsule      oxyCODONE-acetaminophen (PERCOCET) 7.5-325 mg per tablet   6. Chronic pain syndrome G89.4 338.4 tiZANidine (ZANAFLEX) 4 mg tablet      gabapentin (NEURONTIN) 100 mg capsule      oxyCODONE-acetaminophen (PERCOCET) 7.5-325 mg per tablet   7. Encounter for long-term (current) use of high-risk medication Z79.899 V58.69 DRUG SCREEN      AMB POC DRUG SCREEN ()        Do not recommend long term opioid therapy for this patient at this time; risks outweigh potential benefits. Pt currently taking Percocet 7.5/325mg up to 3 times a day as needed with a total of 90 tabs to be budgeted over 30 days. She has been successfully weaned off Morphine ER with last fill date of 12/30/18. Their MME is 33.75.  Today, we will continue with this dosing and plan to continue the weaning of patients opioid medication with a goal of being opioid free, pending safety and compliance at next office visit. Pt instructed to call if they experience any signs of withdrawal (diarrhea, nausea, vomiting, sweating or chills, agitation, itching). At next office visit, the plan is to provide patient with Percocet 7.5/325mg up to 3 times a day as needed with a total of 85 tabs to be budgeted over 30 days. If patient has difficulty with the wean or difficulty with cravings we will consider referral to mental health for ongoing assessment and treatment for opioid use disorder. Continue Zanaflex as needed; refill provided today. Continue Lidocaine patches as needed. Trial of Gabapentin ordered today. OTC capsaicin cream with too much transference. Advised her to try OTC capsaicin patch instead. Continue NSAID as needed with food. Tylenol optimization discussed today; pt may take a total of 3g per acetaminophen from all sources per day. Consider aquatic PT, acupuncture therapy, yoga therapy and/or massage therapy in the future. Consider ordering PT or aquatic PT at next visit as patient has never had this for her neck or back. Consider interventional pain procedures with Dr Elenaor Tovar in the future (SIJ and/or cervical steroid injections or trigger point injections). Follow up ongoing assessment and ongoing development of integrative and comprehensive plan of care for chronic pain. Goals: To establish complementary and integrative plan of care to address chronic pain issues while minimizing pharmaceuticals to maximize patient's function improve quality of life. Education:  Patient again educated on the importance of strict compliance with the opioid care agreement while on opioid therapy. Patient also again educated that they should avoid driving while on chronic opioid therapy. Also advised to avoid alcohol and to avoid benzodiazepines while on opioid therapy. Handouts given regarding opioid safety.       Follow-up and Dispositions · Return in about 2 months (around 5/21/2019) for 30 min. [address] was used for portions of this report. Unintended errors may occur.

## 2019-04-22 DIAGNOSIS — M50.30 DDD (DEGENERATIVE DISC DISEASE), CERVICAL: ICD-10-CM

## 2019-04-22 DIAGNOSIS — M54.12 CERVICAL RADICULOPATHY: ICD-10-CM

## 2019-04-22 DIAGNOSIS — G89.4 CHRONIC PAIN SYNDROME: ICD-10-CM

## 2019-04-22 DIAGNOSIS — M51.36 DDD (DEGENERATIVE DISC DISEASE), LUMBAR: ICD-10-CM

## 2019-04-22 DIAGNOSIS — M54.50 CHRONIC MIDLINE LOW BACK PAIN WITHOUT SCIATICA: ICD-10-CM

## 2019-04-22 DIAGNOSIS — G89.29 CHRONIC MIDLINE LOW BACK PAIN WITHOUT SCIATICA: ICD-10-CM

## 2019-04-22 DIAGNOSIS — M54.2 CERVICALGIA: ICD-10-CM

## 2019-04-23 RX ORDER — GABAPENTIN 300 MG/1
300 CAPSULE ORAL
Qty: 30 CAP | Refills: 0 | Status: SHIPPED | OUTPATIENT
Start: 2019-04-23 | End: 2019-05-24 | Stop reason: SDUPTHER

## 2019-04-23 RX ORDER — OXYCODONE AND ACETAMINOPHEN 7.5; 325 MG/1; MG/1
1 TABLET ORAL
Qty: 90 TAB | Refills: 0 | Status: SHIPPED | OUTPATIENT
Start: 2019-04-23 | End: 2019-05-24 | Stop reason: SDUPTHER

## 2019-05-24 ENCOUNTER — OFFICE VISIT (OUTPATIENT)
Dept: PAIN MANAGEMENT | Age: 49
End: 2019-05-24

## 2019-05-24 VITALS
BODY MASS INDEX: 33.62 KG/M2 | WEIGHT: 227 LBS | DIASTOLIC BLOOD PRESSURE: 82 MMHG | RESPIRATION RATE: 22 BRPM | HEART RATE: 79 BPM | HEIGHT: 69 IN | OXYGEN SATURATION: 97 % | TEMPERATURE: 97.4 F | SYSTOLIC BLOOD PRESSURE: 128 MMHG

## 2019-05-24 DIAGNOSIS — M54.50 CHRONIC MIDLINE LOW BACK PAIN WITHOUT SCIATICA: Primary | ICD-10-CM

## 2019-05-24 DIAGNOSIS — M54.2 CERVICALGIA: ICD-10-CM

## 2019-05-24 DIAGNOSIS — M51.36 DDD (DEGENERATIVE DISC DISEASE), LUMBAR: ICD-10-CM

## 2019-05-24 DIAGNOSIS — G89.29 CHRONIC MIDLINE LOW BACK PAIN WITHOUT SCIATICA: Primary | ICD-10-CM

## 2019-05-24 DIAGNOSIS — G89.4 CHRONIC PAIN SYNDROME: ICD-10-CM

## 2019-05-24 DIAGNOSIS — M54.12 CERVICAL RADICULOPATHY: ICD-10-CM

## 2019-05-24 DIAGNOSIS — M50.30 DDD (DEGENERATIVE DISC DISEASE), CERVICAL: ICD-10-CM

## 2019-05-24 DIAGNOSIS — Z79.899 ENCOUNTER FOR LONG-TERM (CURRENT) USE OF HIGH-RISK MEDICATION: ICD-10-CM

## 2019-05-24 RX ORDER — TIZANIDINE 4 MG/1
4 TABLET ORAL
Qty: 30 TAB | Refills: 2 | Status: SHIPPED | OUTPATIENT
Start: 2019-05-24 | End: 2019-06-23

## 2019-05-24 RX ORDER — OXYCODONE AND ACETAMINOPHEN 7.5; 325 MG/1; MG/1
1 TABLET ORAL
Qty: 90 TAB | Refills: 0 | Status: SHIPPED | OUTPATIENT
Start: 2019-05-26 | End: 2019-06-21 | Stop reason: SDUPTHER

## 2019-05-24 RX ORDER — OXYCODONE AND ACETAMINOPHEN 7.5; 325 MG/1; MG/1
1 TABLET ORAL
COMMUNITY
End: 2019-05-24 | Stop reason: SDUPTHER

## 2019-05-24 RX ORDER — GABAPENTIN 300 MG/1
300 CAPSULE ORAL
Qty: 30 CAP | Refills: 2 | Status: SHIPPED | OUTPATIENT
Start: 2019-05-24 | End: 2019-06-23

## 2019-05-24 RX ORDER — GABAPENTIN 100 MG/1
100 CAPSULE ORAL
Qty: 30 CAP | Refills: 2 | Status: SHIPPED | OUTPATIENT
Start: 2019-05-24 | End: 2019-06-23

## 2019-05-24 NOTE — PROGRESS NOTES
Referral date 9/12/16, source ? and for polyarticular pain including cervical and lumbar spine with radiation. HPI:  Trisha Lafleur is a 54 y.o. female here for f/u visit for ongoing evaluation of chronic neck and lower back pain. Pt was last seen here on 3/21/19. Pt denies interval changes on the character or distribution of pain. Pain is located cervical region described as aching. Pain is located lumbosacral region described as burning to aching to stabbing. Pain at its best is 2/10. Pain at its worse is 8/10. The pain is worsened by prolonged standing and standing on concrete. Symptoms are improved by rest, Naproxen, Zanaflex, SIJ and cervical steroid injections in the past, lower back trigger point injections in the past, Lidocaine patches and heat. Never had PT for neck or back. Knee PT ~1 year ago with good improvement. Pt has tried TENS unit with no perceived benefit. She has not tried aquatic PT, acupuncture therapy, yoga therapy, chiuropractor, massage therapy, implantable pain pump, SCS trial, RFA, Lyrica or TCA. Allergy to Wellbutrin and is therefore unable to take Cymbalta stating \"that would do the same thing\". Stopped taking Topamax for migraines due to it affecting her memory. Since last visit, pt has been tolerating Gabapentin well and is interested in increasing this today. Zanaflex continues to help  She has been using OTC capsaicin patches with relief.       Social History     Socioeconomic History    Marital status:      Spouse name: Not on file    Number of children: Not on file    Years of education: Not on file    Highest education level: Not on file   Occupational History    Not on file   Social Needs    Financial resource strain: Not on file    Food insecurity:     Worry: Not on file     Inability: Not on file    Transportation needs:     Medical: Not on file     Non-medical: Not on file   Tobacco Use    Smoking status: Former Smoker    Smokeless tobacco: Current User    Tobacco comment: pt using vapor cigarettes with 6% nicotine   Substance and Sexual Activity    Alcohol use: No    Drug use: No    Sexual activity: Not on file   Lifestyle    Physical activity:     Days per week: Not on file     Minutes per session: Not on file    Stress: Not on file   Relationships    Social connections:     Talks on phone: Not on file     Gets together: Not on file     Attends Spiritism service: Not on file     Active member of club or organization: Not on file     Attends meetings of clubs or organizations: Not on file     Relationship status: Not on file    Intimate partner violence:     Fear of current or ex partner: Not on file     Emotionally abused: Not on file     Physically abused: Not on file     Forced sexual activity: Not on file   Other Topics Concern    Not on file   Social History Narrative    Not on file     No family history on file. Allergies   Allergen Reactions    Wellbutrin [Bupropion Hcl] Other (comments)     manic     Past Medical History:   Diagnosis Date    Depression     Headache     Hypoglycemia     Insomnia     Tinnitus      Past Surgical History:   Procedure Laterality Date    HX CHOLECYSTECTOMY  2010    HX HEENT      adenoids with tube placemen , age 13   [facility] ORTHOPAEDIC Right 1992,2006    arthroscopic shoulder    HX ORTHOPAEDIC Left [id number]    foot repair after break , bone spur removal     Current Outpatient Medications on File Prior to Visit   Medication Sig    traZODone (DESYREL) 100 mg tablet Take 100 mg by mouth nightly.  venlafaxine-SR (EFFEXOR XR) 150 mg capsule Take 150 mg by mouth daily.  SUMAtriptan-naproxen (TREXIMET)  mg tablet Take 1 Tab by mouth once as needed.  predniSONE (STERAPRED DS) 10 mg dose pack Take 10 mg by mouth as needed.  naproxen (NAPROSYN) 500 mg tablet Take 500 mg by mouth two (2) times daily (with meals).  SUMAtriptan (IMITREX) 50 mg tablet Take 100 mg by mouth two (2) times a day.     naloxone (EVZIO) 2 mg/0.4 mL auto-injector 0.4 mL by IntraMUSCular route once as needed for Overdose.  ibuprofen (MOTRIN) 200 mg tablet Take 800 mg by mouth every eight (8) hours as needed for Pain.  naloxone (NARCAN) 4 mg/actuation nasal spray 1 [place] by IntraNASal route as needed. Indications: OPIATE-INDUCED RESPIRATORY DEPRESSION     No current facility-administered medications on file prior to visit. ROS:  Denies fever, chills, nausea, vomiting, diarrhea, constipation, abdominal pain, chest pain, shortness or breath/trouble breathing, weakness, trouble swallowing, changes in vision, changes in hearing, falls, dizziness, bladder incontinence, bowel incontinence, depression, anxiety, suicidal ideations, homicidal ideations or alcohol use. Opioid specific risk: GERD, obesity, insomnia. Vitals:    05/24/19 1317   BP: 128/82   Pulse: 79   Resp: 22   Temp: 97.4 °F (36.3 °C)   TempSrc: Oral   SpO2: 97%   Weight: 103 kg (227 lb)   Height: 5' 9\" (1.753 m)   PainSc:   1   PainLoc: Neck   LMP: 05/10/2019          Physical exam:  AFVSS, no acute distress, obese body habitus. A&OXs 3. Normocephalic, atraumatic. Conjugate gaze, clear sclerae. Speech is clear and appropriate. Mood is appropriate and patient is cooperative. Gait and balance are within functional limits. Non-labored breathing.       Calculated MEQ - 33.75  Naloxone rescue - yes  Prophylactic bowel program - no  Date of last OCA today  Last UDS 3/21/19, consistent   date checked today, findings consistent    Primary Care Physician  Pamella Horton[address] [facility],  [address] [address] [phone]    Today   Last Visit Prior Visit(s)  PGIC - 5 & 3  6 & 2  5 & 2  KAYA - 22%  22%  14%  COMM - 1  4  3    PHQ -- .  3 most recent PHQ Screens 5/24/2019   PHQ Not Done -   Little interest or pleasure in doing things Not at all   Feeling down, depressed, irritable, or hopeless Not at all   Total Score PHQ 2 0   Trouble falling or staying asleep, or sleeping too much Not at all   Feeling tired or having little energy Not at all   Poor appetite, weight loss, or overeating Not at all   Feeling bad about yourself - or that you are a failure or have let yourself or your family down Not at all   Trouble concentrating on things such as school, work, reading, or watching TV Not at all   Moving or speaking so slowly that other people could have noticed; or the opposite being so fidgety that others notice Not at all   Thoughts of being better off dead, or hurting yourself in some way Not at all   PHQ 9 Score 0   How difficult have these problems made it for you to do your work, take care of your home and get along with others Not difficult at all         Assessment/Plan:     ICD-10-CM ICD-9-CM    1. Chronic midline low back pain without sciatica M54.5 724.2 gabapentin (NEURONTIN) 300 mg capsule    G89.29 338.29 gabapentin (NEURONTIN) 100 mg capsule      tiZANidine (ZANAFLEX) 4 mg tablet      oxyCODONE-acetaminophen (PERCOCET) 7.5-325 mg per tablet   2. Cervical radiculopathy M54.12 723.4 gabapentin (NEURONTIN) 300 mg capsule      gabapentin (NEURONTIN) 100 mg capsule      tiZANidine (ZANAFLEX) 4 mg tablet      oxyCODONE-acetaminophen (PERCOCET) 7.5-325 mg per tablet   3. DDD (degenerative disc disease), cervical M50.30 722.4 gabapentin (NEURONTIN) 300 mg capsule      gabapentin (NEURONTIN) 100 mg capsule      tiZANidine (ZANAFLEX) 4 mg tablet      oxyCODONE-acetaminophen (PERCOCET) 7.5-325 mg per tablet   4. DDD (degenerative disc disease), lumbar M51.36 722.52 gabapentin (NEURONTIN) 300 mg capsule      gabapentin (NEURONTIN) 100 mg capsule      tiZANidine (ZANAFLEX) 4 mg tablet      oxyCODONE-acetaminophen (PERCOCET) 7.5-325 mg per tablet   5.  Chronic pain syndrome G89.4 338.4 gabapentin (NEURONTIN) 300 mg capsule      gabapentin (NEURONTIN) 100 mg capsule      tiZANidine (ZANAFLEX) 4 mg tablet      oxyCODONE-acetaminophen (PERCOCET) 7.5-325 mg per tablet   6. Cervicalgia M54.2 723.1 gabapentin (NEURONTIN) 300 mg capsule      gabapentin (NEURONTIN) 100 mg capsule      tiZANidine (ZANAFLEX) 4 mg tablet      oxyCODONE-acetaminophen (PERCOCET) 7.5-325 mg per tablet   7. Encounter for long-term (current) use of high-risk medication Z79.899 V58.69         Do not recommend long term opioid therapy for this patient at this time; risks outweigh potential benefits. Pt currently taking Percocet 7.5/325mg up to 3 times a day as needed with a total of 90 tabs to be budgeted over 30 days. She has been successfully weaned off Morphine ER with last fill date of 12/30/18. Their MME is 33.75. Today, we will pause the weaning of patients opioid medication with a goal of being opioid free, pending safety and compliance at next office visit. Pt instructed to call if they experience any signs of withdrawal (diarrhea, nausea, vomiting, sweating or chills, agitation, itching). At next office visit, the plan is to provide patient with Percocet 7.5/325mg up to 3 times a day as needed with a total of 85 tabs to be budgeted over 30 days. If patient has difficulty with the wean or difficulty with cravings we will consider referral to mental health for ongoing assessment and treatment for opioid use disorder. --Will taper Gabapentin up as tolerated. She is currently taking 300mg QHS, therefore we will increase 100mg each week until she is at 600mg QHS. --Continue Zanaflex as needed; refill provided today. --Continue Lidocaine patches as needed. --Continue OTC capsaicin patches as needed. --Continue NSAID as needed with food. --Continue Tylenol optimization; pt may take a total of 300mg per acetaminophen from all sources per 24 hour period. --Consider aquatic PT, acupuncture therapy, yoga therapy and/or massage therapy in the future.   --Consider interventional pain procedures with Dr Imtiaz Ferreira in the future (SIJ and/or cervical steroid injections or trigger point injections). Follow up ongoing assessment and ongoing development of integrative and comprehensive plan of care for chronic pain. Goals: To establish complementary and integrative plan of care to address chronic pain issues while minimizing pharmaceuticals to maximize patient's function improve quality of life. Education:  Patient again educated on the importance of strict compliance with the opioid care agreement while on opioid therapy. Patient also again educated that they should avoid driving while on chronic opioid therapy. Also advised to avoid alcohol and to avoid benzodiazepines while on opioid therapy. Handouts given regarding opioid safety. Follow-up and Dispositions    · Return in about 3 months (around 8/24/2019) for 30 min. [address] was used for portions of this report. Unintended errors may occur.

## 2019-05-24 NOTE — PATIENT INSTRUCTIONS
Safe Use of Opioid Pain Medicine: Care Instructions  Your Care Instructions  Pain is your body's way of warning you that something is wrong. Pain feels different for everybody. Only you can describe your pain. A doctor can suggest or prescribe many types of medicines for pain. These range from over-the-counter medicines like acetaminophen (Tylenol) to powerful medicines called opioids. Examples of opioids are fentanyl, hydrocodone, morphine, and oxycodone. Heroin is an illegal opioid  Opioids are strong medicines. They can help you manage pain when you use them the right way. But if you misuse them, they can cause serious harm and even death. For these reasons, doctors are very careful about how they prescribe opioids. If you decide to take opioids, here are some things to remember. · Keep your doctor informed. You can get addicted to opioids. The risk is higher if you have a history of substance use. Your doctor will monitor you closely for signs of misuse and addiction and to figure out when you no longer need to take opioids. · Make a treatment plan. The goal of your plan is to be able to function and do the things you need to do, even if you still have some pain. You might be able to manage your pain with other non-opioid options like physical therapy, relaxation, or over-the-counter pain medicines. · Be aware of the side effects. Opioids can cause serious side effects, such as constipation, dry mouth, and nausea. And over time, you may need a higher dose to get pain relief. This is called tolerance. Your body also gets used to opioids. This is called physical dependence. If you suddenly stop taking them, you may have withdrawal symptoms. The doctor carefully considered what pain medicine is right for you. You may not have received opioids if your doctor was concerned about drug interactions or your safety, or if he or she had other concerns.   It is best to have one doctor or clinic treat your pain. This way you will get the pain medicine that will help you the most. And a doctor will be able to watch for any problems that the medicine might cause. The doctor has checked you carefully, but problems can develop later. If you notice any problems or new symptoms,  get medical treatment right away. Follow-up care is a key part of your treatment and safety. Be sure to make and go to all appointments, and call your doctor if you are having problems. It's also a good idea to know your test results and keep a list of the medicines you take. How can you care for yourself at home? If you need to take opioids to manage your pain, remember these safety tips. · Follow directions carefully. It's easy to misuse opioids if you take a dose other than what's prescribed by your doctor. This can lead to overdose and even death. Even sharing them with someone they weren't meant for is misuse. · Be cautious. Opioids may affect your judgment and decision making. Do not drive or operate machinery until you can think clearly. Talk with your doctor about when it is safe to drive. · Reduce the risk of drug interactions. Opioids can be dangerous if you take them with alcohol or with certain drugs like sleeping pills and muscle relaxers. Make sure your doctor knows about all the other medicines you take, including over-the-counter medicines. Don't start any new medicines before you talk to your doctor or pharmacist.  · Safely store and dispose of opioids. Store opioids in a safe and secure place. Make sure that pets, children, friends, and family can't get to them. When you're done using opioids, make sure to dispose of them safely and as quickly as possible. The U.S. Food and Drug Administration (FDA) recommends these disposal options. ? The best option is to take your medicine to a drop-off box or take-back program that is authorized by the [address] ([facility]).   ? If these programs aren't available in your area and your medicine doesn't have specific disposal instructions (such as flushing), you can throw them into your household trash if you follow the FDA's instructions. Visit fda.gov and search for \"unused medicine disposal.\"  ? If you have opioid patches (used or unused), your options are to take them to a [facility]-authorized site or flush them down the toilet. Do not throw them in the trash. ? Only flush your medicine down the toilet if you can't get to a [facility]-approved site or your medicine instructions state clearly to flush them. · Reduce the risk of overdose. Misuse of opioids can be very dangerous. Protect yourself by asking your doctor about a naloxone rescue kit. It can help you--and even save your life--if you take too much of an opioid. Try other ways to reduce pain. · Relax, and reduce stress. Relaxation techniques such as deep breathing or meditation can help. · Keep moving. Gentle, daily exercise can help reduce pain over the long run. Try low- or no-impact exercises such as walking, swimming, and stationary biking. Do stretches to stay flexible. · Try heat, cold packs, and massage. · Get enough sleep. Pain can make you tired and drain your energy. Talk with your doctor if you have trouble sleeping because of pain. · Think positive. Your thoughts can affect your pain level. Do things that you enjoy to distract yourself when you have pain instead of focusing on the pain. See a movie, read a book, listen to music, or spend time with a friend. If you are not taking a prescription pain medicine, ask your doctor if you can take an over-the-counter medicine. When should you call for help?   Call your doctor now or seek immediate medical care if:    · You have a new kind of pain.     · You have new symptoms, such as a fever or rash, along with the pain.    Watch closely for changes in your health, and be sure to contact your doctor if:    · You think you might be using too much pain medicine, and you need help to use less or stop.     · Your pain gets worse.     · You would like a referral to a doctor or clinic that specializes in pain management. Where can you learn more? Go to http://jackie-yair.info/. Enter R108 in the search box to learn more about \"Safe Use of Opioid Pain Medicine: Care Instructions. \"  Current as of: Sravani 3, 2018  Content Version: 11.9  © 5042-1302 [employer]. Care instructions adapted under license by [employer] (which disclaims liability or warranty for this information). If you have questions about a medical condition or this instruction, always ask your healthcare professional. [place] [address] any warranty or liability for your use of this information. Stopping Smokeless Tobacco Use: Care Instructions  Your Care Instructions    Smokeless tobacco comes in many forms, such as snuff and chewing tobacco:  · Snuff is finely ground tobacco sold in cans or pouches. Most of the time, snuff is used by putting a \"pinch\" or \"dip\" between the lower lip or cheek and the gum. · Chewing tobacco is sold as loose leaves, plugs, or twists. It is chewed or placed between the cheek and the gum or teeth. There are plenty of reasons to stop using smokeless tobacco. These products are harmful. They are not risk-free alternatives to smoking. Smokeless tobacco contains nicotine, which is addicting. Though using smokeless tobacco is less harmful than smoking cigarettes, it can cause serious health problems, such as:  · White patches or red sores in your mouth that can turn into mouth cancer involving the lip, tongue, or cheek. · Tooth loss and other dental problems. · Gum disease. Your gums may pull away from your teeth and not grow back. People who use smokeless tobacco crave the nicotine in it. Giving up smokeless tobacco is much harder than simply changing a habit. Your body has to stop craving the nicotine.  It is hard to quit, but you can do it. Many tools are available for people who want to quit using smokeless tobacco. You may find that combining tools works best for you. There are several steps to quitting. First you get ready to quit. Then you get support to help you. After that, you learn new skills and behaviors to quit. For many people, a necessary step is getting and using medicine. Your doctor will help you set up the plan that best meets your needs. You may want to attend a tobacco cessation program. When you choose a program, look for one that has proven success. Ask your doctor for ideas. You will greatly increase your chances of success if you take medicine as well as get counseling or join a cessation program.  Some of the changes you feel when you first quit smokeless tobacco are uncomfortable. Your body will miss the nicotine at first, and you may feel short-tempered and grumpy. You may have trouble sleeping or concentrating. Medicine can help you deal with these symptoms. You may struggle with changing your habits and rituals. The last step is the tricky one: Be prepared for the urge to use smokeless tobacco to continue for a time. This is a lot to deal with, but keep at it. You will feel better. Follow-up care is a key part of your treatment and safety. Be sure to make and go to all appointments, and call your doctor if you are having problems. It's also a good idea to know your test results and keep a list of the medicines you take. How can you care for yourself at home? · Ask your family, friends, and coworkers for support. You have a better chance of quitting if you have help and support. · Join a support group for people who are trying to quit using smokeless tobacco.  · Set a quit date. Pick your date carefully so that it is not right in the middle of a big deadline or stressful time. After you quit, do not use smokeless tobacco even once. Get rid of all spit cups, cans, and pouches after your last use.  Clean your house and your clothes so that they do not smell of tobacco.  · Learn how to be a non-user. Think about ways you can avoid those things that make you reach for tobacco.  ? Learn some ways to deal with cravings, like calling a friend or going for a walk. Cravings often pass. ? Avoid situations that put you at greatest risk for using smokeless tobacco. For some people, it is hard to spend time with friends without dipping or chewing. For others, they might skip a coffee break with coworkers who smoke or use smokeless tobacco.  ? Change your daily routine. Take a different route to work, or eat a meal in a different place. · Cut down on stress. Calm yourself or release tension by doing an activity you enjoy, such as reading a book, taking a hot bath, or gardening. · Talk to your doctor or pharmacist about nicotine replacement therapy. You still get nicotine, but you do not use tobacco. Nicotine replacement products help you slowly reduce the amount of nicotine you need. Many of these products are available over the counter. They include nicotine patches, gum, lozenges, and inhalers. · Ask your doctor about bupropion (Wellbutrin) or varenicline (Chantix), which are prescription medicines. They do not contain nicotine. They help you by reducing withdrawal symptoms, such as stress and anxiety. · Get regular exercise. Having healthy habits will help your body move past its craving for nicotine. · Be prepared to keep trying. Most people are not successful the first few times they try to quit. Do not get mad at yourself if you use tobacco again. Make a list of things you learned, and think about when you want to try again, such as next week, next month, or next year. Where can you learn more? Go to http://jackie-yair.info/. Enter Q957 in the search box to learn more about \"Stopping Smokeless Tobacco Use: Care Instructions. \"  Current as of: September 26, 2018  Content Version: 11.9  © 2737-8256 [employer]. Care instructions adapted under license by [employer] (which disclaims liability or warranty for this information). If you have questions about a medical condition or this instruction, always ask your healthcare professional. [place] [address] any warranty or liability for your use of this information.

## 2019-05-24 NOTE — PROGRESS NOTES
Nursing Notes    Patient presents to the office today in follow-up. Patient rates her pain at 1/10 on the numerical pain scale. Reviewed medications with counts as follows:    Rx Date filled Qty Dispensed Pill Count Last Dose Short   Percocet 7.5/325 mg  04/26/19 90 0 today                                        reviewed YES  Any aberrancies noted on  NO  Last opioid agreement 06/18/18  Last urine drug screen 03/21/19    Comments:     POC UDS was not performed in office today. Any new labs or imaging since last appointment? NO    Have you been to an emergency room (ER) or urgent care clinic since your last visit? NO            Have you been hospitalized since your last visit? NO     If yes, where, when, and reason for visit? Have you seen or consulted any other health care providers outside of the [address] [place] [address]  since your last visit? NO     If yes, where, when, and reason for visit? Ms. Saavedra has a reminder for a \"due or due soon\" health maintenance. I have asked that she contact her primary care provider for follow-up on this health maintenance.     3 most recent PHQ Screens 5/24/2019   PHQ Not Done -   Little interest or pleasure in doing things Not at all   Feeling down, depressed, irritable, or hopeless Not at all   Total Score PHQ 2 0   Trouble falling or staying asleep, or sleeping too much Not at all   Feeling tired or having little energy Not at all   Poor appetite, weight loss, or overeating Not at all   Feeling bad about yourself - or that you are a failure or have let yourself or your family down Not at all   Trouble concentrating on things such as school, work, reading, or watching TV Not at all   Moving or speaking so slowly that other people could have noticed; or the opposite being so fidgety that others notice Not at all   Thoughts of being better off dead, or hurting yourself in some way Not at all   PHQ 9 Score 0   How difficult have these problems made it for you to do your work, take care of your home and get along with others Not difficult at all     Abuse Screening Questionnaire 5/24/2019   Do you ever feel afraid of your partner? N   Are you in a relationship with someone who physically or mentally threatens you? N   Is it safe for you to go home? Y     The pt does not have an advanced medical directive, POA, or living will.

## 2019-06-21 DIAGNOSIS — M50.30 DDD (DEGENERATIVE DISC DISEASE), CERVICAL: ICD-10-CM

## 2019-06-21 DIAGNOSIS — M51.36 DDD (DEGENERATIVE DISC DISEASE), LUMBAR: ICD-10-CM

## 2019-06-21 DIAGNOSIS — M54.50 CHRONIC MIDLINE LOW BACK PAIN WITHOUT SCIATICA: ICD-10-CM

## 2019-06-21 DIAGNOSIS — M54.2 CERVICALGIA: ICD-10-CM

## 2019-06-21 DIAGNOSIS — M54.12 CERVICAL RADICULOPATHY: ICD-10-CM

## 2019-06-21 DIAGNOSIS — G89.29 CHRONIC MIDLINE LOW BACK PAIN WITHOUT SCIATICA: ICD-10-CM

## 2019-06-21 DIAGNOSIS — G89.4 CHRONIC PAIN SYNDROME: ICD-10-CM

## 2019-06-21 NOTE — TELEPHONE ENCOUNTER
Cass Jain has called requesting a refill of their controlled medication, percocet, for the management of her chronic back pain. Last office visit date: 05/24/19  Last opioid care agreement 05/24/19  Last UDS was done 03/21/19    Date last  was pulled and reviewed : 06/21/19  Last fill date for medication was 05/26/19 for percocet 7.5/325 mg #90 tabs     Was the patient compliant when the above report was pulled? yes    Analgesia: pt reports an 85% pain relief with her current opioid medication regimen    Aberrancies: none noted    ADL's: pt reports that she is able to perform her normal daily tasks because she is taking her medication. Adverse Reaction: none reported by the pt at this time. Provider's last note and plan of care reviewed? yes  Request forwarded to provider for review.

## 2019-06-24 RX ORDER — OXYCODONE AND ACETAMINOPHEN 7.5; 325 MG/1; MG/1
1 TABLET ORAL
Qty: 90 TAB | Refills: 0 | Status: SHIPPED | OUTPATIENT
Start: 2019-06-25 | End: 2019-07-18 | Stop reason: SDUPTHER

## 2019-06-24 NOTE — TELEPHONE ENCOUNTER
Spoke with patient after getting 2 points of identity, informing patient that there is a script at the office ready to pickup and she needs to be here by 3:45pm Monday thru Friday and bring a valid picture ID - patient confirmed

## 2019-07-17 ENCOUNTER — TELEPHONE (OUTPATIENT)
Dept: PAIN MANAGEMENT | Age: 49
End: 2019-07-17

## 2019-07-17 NOTE — PERIOP NOTES
Call placed to patient to schedule PAT W/H&P. Spine class offered to patient but she is unavailable on Tuesday 7/23/19, DOS 7/29/19.

## 2019-07-18 DIAGNOSIS — M54.2 CERVICALGIA: ICD-10-CM

## 2019-07-18 DIAGNOSIS — M50.30 DDD (DEGENERATIVE DISC DISEASE), CERVICAL: ICD-10-CM

## 2019-07-18 DIAGNOSIS — G89.29 CHRONIC MIDLINE LOW BACK PAIN WITHOUT SCIATICA: ICD-10-CM

## 2019-07-18 DIAGNOSIS — G89.4 CHRONIC PAIN SYNDROME: ICD-10-CM

## 2019-07-18 DIAGNOSIS — M51.36 DDD (DEGENERATIVE DISC DISEASE), LUMBAR: ICD-10-CM

## 2019-07-18 DIAGNOSIS — M54.50 CHRONIC MIDLINE LOW BACK PAIN WITHOUT SCIATICA: ICD-10-CM

## 2019-07-18 DIAGNOSIS — M54.12 CERVICAL RADICULOPATHY: ICD-10-CM

## 2019-07-18 RX ORDER — OXYCODONE AND ACETAMINOPHEN 7.5; 325 MG/1; MG/1
1 TABLET ORAL
Qty: 90 TAB | Refills: 0 | Status: SHIPPED | OUTPATIENT
Start: 2019-07-25 | End: 2019-07-30

## 2019-07-18 NOTE — TELEPHONE ENCOUNTER
Vucaren Fernandez has called requesting a refill of their controlled medication, Percocet 7.5/325 mg tab, for the management of chronic pain. Last office visit date: 5/24/19 with [facility] and has a f/u appt on 8/12/19. Last opioid care agreement 5/24/19  Last UDS was done 3/21/19    Date last  was pulled and reviewed : 7/18/19  Last fill date for medication was 6/25/19    Was the patient compliant when the above report was pulled? yes    Analgesia: Per S. [place]ualberto, Patient reports 80% pain relief on current regimen. Aberrancies: No aberrancies noted in the last 30 days. ADL's: Per S. [place], Patient states they are able to perform ADL's on current regimen. Adverse Reaction: Per S. [place], Patient reports no adverse reactions at this time. Provider's last note and plan of care reviewed? yes  Request forwarded to provider for review.

## 2019-07-19 NOTE — TELEPHONE ENCOUNTER
Informed patient that she has a prescription at  ready to pickup - please arrive no later than 3:45pm and bring a valid picture ID.

## 2019-07-22 ENCOUNTER — HOSPITAL ENCOUNTER (OUTPATIENT)
Dept: NON INVASIVE DIAGNOSTICS | Age: 49
Discharge: HOME | End: 2019-07-22
Attending: ORTHOPAEDIC SURGERY
Payer: COMMERCIAL

## 2019-07-22 ENCOUNTER — HOSPITAL ENCOUNTER (OUTPATIENT)
Dept: PREADMISSION TESTING | Age: 49
Discharge: HOME | End: 2019-07-22
Payer: COMMERCIAL

## 2019-07-22 VITALS
WEIGHT: 235.5 LBS | RESPIRATION RATE: 16 BRPM | HEIGHT: 69 IN | TEMPERATURE: 98.1 F | HEART RATE: 81 BPM | BODY MASS INDEX: 34.88 KG/M2 | OXYGEN SATURATION: 96 % | DIASTOLIC BLOOD PRESSURE: 76 MMHG | SYSTOLIC BLOOD PRESSURE: 136 MMHG

## 2019-07-22 LAB
25(OH)D3 SERPL-MCNC: 20 NG/ML (ref 30–100)
ABO + RH BLD: NORMAL
ALBUMIN SERPL-MCNC: 4.1 G/DL (ref 3.5–5)
ALBUMIN/GLOB SERPL: 1.4 {RATIO} (ref 1.1–2.2)
ALP SERPL-CCNC: 66 U/L (ref 45–117)
ALT SERPL-CCNC: 26 U/L (ref 12–78)
ANION GAP SERPL CALC-SCNC: 3 MMOL/L (ref 5–15)
APPEARANCE UR: CLEAR
APTT PPP: 26.2 SEC (ref 22.1–32)
AST SERPL-CCNC: 28 U/L (ref 15–37)
BACTERIA URNS QL MICRO: NEGATIVE /HPF
BASOPHILS # BLD: 0.1 K/UL (ref 0–0.1)
BASOPHILS NFR BLD: 1 % (ref 0–1)
BILIRUB SERPL-MCNC: 0.4 MG/DL (ref 0.2–1)
BILIRUB UR QL: NEGATIVE
BLOOD GROUP ANTIBODIES SERPL: NORMAL
BUN SERPL-MCNC: 12 MG/DL (ref 6–20)
BUN/CREAT SERPL: 14 (ref 12–20)
CALCIUM SERPL-MCNC: 9.3 MG/DL (ref 8.5–10.1)
CHLORIDE SERPL-SCNC: 107 MMOL/L (ref 97–108)
CO2 SERPL-SCNC: 29 MMOL/L (ref 21–32)
COLOR UR: ABNORMAL
CREAT SERPL-MCNC: 0.84 MG/DL (ref 0.55–1.02)
DIFFERENTIAL METHOD BLD: NORMAL
EOSINOPHIL # BLD: 0.3 K/UL (ref 0–0.4)
EOSINOPHIL NFR BLD: 5 % (ref 0–7)
EPITH CASTS URNS QL MICRO: ABNORMAL /LPF
ERYTHROCYTE [DISTWIDTH] IN BLOOD BY AUTOMATED COUNT: 12.4 % (ref 11.5–14.5)
EST. AVERAGE GLUCOSE BLD GHB EST-MCNC: 114 MG/DL
GLOBULIN SER CALC-MCNC: 3 G/DL (ref 2–4)
GLUCOSE SERPL-MCNC: 115 MG/DL (ref 65–100)
GLUCOSE UR STRIP.AUTO-MCNC: NEGATIVE MG/DL
HBA1C MFR BLD: 5.6 % (ref 4.2–6.3)
HCT VFR BLD AUTO: 44.2 % (ref 35–47)
HGB BLD-MCNC: 15.4 G/DL (ref 11.5–16)
HGB UR QL STRIP: ABNORMAL
HYALINE CASTS URNS QL MICRO: ABNORMAL /LPF (ref 0–5)
IMM GRANULOCYTES # BLD AUTO: 0 K/UL (ref 0–0.04)
IMM GRANULOCYTES NFR BLD AUTO: 0 % (ref 0–0.5)
INR PPP: 1 (ref 0.9–1.1)
KETONES UR QL STRIP.AUTO: NEGATIVE MG/DL
LEUKOCYTE ESTERASE UR QL STRIP.AUTO: NEGATIVE
LYMPHOCYTES # BLD: 1.9 K/UL (ref 0.8–3.5)
LYMPHOCYTES NFR BLD: 27 % (ref 12–49)
MCH RBC QN AUTO: 30.7 PG (ref 26–34)
MCHC RBC AUTO-ENTMCNC: 34.8 G/DL (ref 30–36.5)
MCV RBC AUTO: 88 FL (ref 80–99)
MONOCYTES # BLD: 0.6 K/UL (ref 0–1)
MONOCYTES NFR BLD: 9 % (ref 5–13)
NEUTS SEG # BLD: 4.1 K/UL (ref 1.8–8)
NEUTS SEG NFR BLD: 58 % (ref 32–75)
NITRITE UR QL STRIP.AUTO: NEGATIVE
NRBC # BLD: 0 K/UL (ref 0–0.01)
NRBC BLD-RTO: 0 PER 100 WBC
PH UR STRIP: 5 [PH] (ref 5–8)
PLATELET # BLD AUTO: 216 K/UL (ref 150–400)
PMV BLD AUTO: 11 FL (ref 8.9–12.9)
POTASSIUM SERPL-SCNC: 4.7 MMOL/L (ref 3.5–5.1)
PROT SERPL-MCNC: 7.1 G/DL (ref 6.4–8.2)
PROT UR STRIP-MCNC: NEGATIVE MG/DL
PROTHROMBIN TIME: 10.2 SEC (ref 9–11.1)
RBC # BLD AUTO: 5.02 M/UL (ref 3.8–5.2)
RBC #/AREA URNS HPF: ABNORMAL /HPF (ref 0–5)
SODIUM SERPL-SCNC: 139 MMOL/L (ref 136–145)
SP GR UR REFRACTOMETRY: 1.02 (ref 1–1.03)
SPECIMEN EXP DATE BLD: NORMAL
THERAPEUTIC RANGE,PTTT: NORMAL SECS (ref 58–77)
UA: UC IF INDICATED,UAUC: ABNORMAL
UROBILINOGEN UR QL STRIP.AUTO: 0.2 EU/DL (ref 0.2–1)
WBC # BLD AUTO: 7 K/UL (ref 3.6–11)
WBC URNS QL MICRO: ABNORMAL /HPF (ref 0–4)

## 2019-07-22 PROCEDURE — 36415 COLL VENOUS BLD VENIPUNCTURE: CPT

## 2019-07-22 PROCEDURE — 81001 URINALYSIS AUTO W/SCOPE: CPT

## 2019-07-22 PROCEDURE — 85025 COMPLETE CBC W/AUTO DIFF WBC: CPT

## 2019-07-22 PROCEDURE — 93005 ELECTROCARDIOGRAM TRACING: CPT

## 2019-07-22 PROCEDURE — 85730 THROMBOPLASTIN TIME PARTIAL: CPT

## 2019-07-22 PROCEDURE — 83036 HEMOGLOBIN GLYCOSYLATED A1C: CPT

## 2019-07-22 PROCEDURE — 80053 COMPREHEN METABOLIC PANEL: CPT

## 2019-07-22 PROCEDURE — 86900 BLOOD TYPING SEROLOGIC ABO: CPT

## 2019-07-22 PROCEDURE — 85610 PROTHROMBIN TIME: CPT

## 2019-07-22 PROCEDURE — 82306 VITAMIN D 25 HYDROXY: CPT

## 2019-07-22 RX ORDER — LIDOCAINE 50 MG/G
1 PATCH TOPICAL AS NEEDED
COMMUNITY

## 2019-07-22 RX ORDER — GABAPENTIN 300 MG/1
600 CAPSULE ORAL
COMMUNITY
End: 2019-08-12 | Stop reason: SDUPTHER

## 2019-07-22 RX ORDER — SUMATRIPTAN 100 MG/1
100 TABLET, FILM COATED ORAL AS NEEDED
COMMUNITY

## 2019-07-22 RX ORDER — TIZANIDINE 4 MG/1
4 TABLET ORAL
COMMUNITY
End: 2019-08-12 | Stop reason: SDUPTHER

## 2019-07-22 NOTE — PERIOP NOTES
[address]                   [facility], [address] [place]                            MAIN OR                                  [phone]   MAIN PRE OP                          [phone]                                                                                AMBULATORY PRE OP          [phone]  PRE-ADMISSION TESTING    [phone]     Surgery Date:   7/29/2019 Monday      Is surgery arrival time given by surgeon? NO  If NO, [facility] staff will call you between 3 and 7pm the day before your surgery with your arrival time. (If your surgery is on a Monday, we will call you the Friday before.)    Call [phone] after 7pm Monday-Friday if you did not receive your arrival time. INSTRUCTIONS BEFORE YOUR SURGERY   When You  Arrive   Arrive at the [address] [facility] on the day of your surgery  Have your insurance card, photo ID, and any copayment (if needed)     Food   and   Drink   NO food or drink after midnight the night before surgery    This means NO water, gum, mints, coffee, juice, etc.  No alcohol (beer, wine, liquor) 24 hours before and after surgery     Medications to   TAKE   Morning of Surgery   MEDICATIONS TO TAKE THE MORNING OF SURGERY WITH A SIP OF WATER:    Percocet and Sumatriptan if needed     Medications  To  STOP      7 days before surgery    Non-Steroidal anti-inflammatory Drugs (NSAID's): for example, Ibuprofen (Advil, Motrin), Naproxen (Aleve)   Aspirin, if taking for pain    Herbal supplements, vitamins, and fish oil   Other:  (Pain medications not listed above, including Tylenol may be taken)   Blood  Thinners    If you take  Aspirin, Plavix, Coumadin, or any blood-thinning or anti-blood clot medicine, talk to the doctor who prescribed the medications for pre-operative instructions.      Bathing Clothing  Jewelry  Valuables       If you shower the morning of surgery, please do not apply anything to your skin (lotions, powders, deodorant, or makeup, especially mascara)   Follow all special bath instructions (for total joint replacement, spine and bowel surgeries)   Do not shave or trim anywhere 24 hours before surgery   Wear your hair loose or down; no pony-tails, buns, or metal hair clips   Wear loose, comfortable, clean clothes   Wear glasses instead of contacts   Leave money, valuables, and jewelry, including body piercings, at home     Going Home       or Spending the Night    SAME-DAY SURGERY: You must have a responsible adult drive you home and stay with you 24 hours after surgery   ADMITS: If your doctor is keeping you into the hospital after surgery, leave personal belongings/luggage in your car until you have a hospital room number. Hospital discharge time is 12 noon  Drivers must be here before 12 noon unless you are told differently   Special Instructions   · Use Chlorhexidine Care Fusion wash and sponges 3 days prior to surgery as instructed. · Incentive spirometer given with instructions to practice at home and bring back to the hospital on the day of surgery. · [facility] will contact you if your Hemoglobin A1C is greater than 7.5. · Ensure/Glucerna  sample, nutritional information, and Ensure/Glucerna coupon given. · Pain pamphlet and Call Don't Fall reminder reviewed with patient. ·  parking is complimentary Monday - Friday 7 am - 5 pm  · Bring PTA Medication list day of surgery with the last doses taken documented   · Do not bring medication bottles the day of surgery         Follow all instructions so your surgery wont be cancelled. Please, be on time. If a situation occurs and you are delayed the day of surgery, call [phone]. If your physical condition changes (like a fever, cold, flu, etc.) call your surgeon. The patient was contacted  in person. Home medication reviewed and verified during PAT appointment.   The patient verbalizes understanding of all instructions and does not  need reinforcement.

## 2019-07-22 NOTE — H&P
Preoperative Evaluation                     History and Physical with Surgical Risk Stratification     7/22/2019    CC: Neck pain  Surgery: C 5-7 ACDF     HPI:   Hailey White is a 54 y.o. female referred for pre-operative evaluation by Dr. Ava Feldman for upcoming surgery on 7/29/19. She notes that when she turned 42 she thinks everything started to hurt. She has no previous trauma or injury, she just notes that one day her neck started hurting and it has gotten progressively worse over time. She is in pain management for her low back pain and neck. She takes Percocet TID at this time. The pain is between her shoulder blades and up into her neck. The pain also runs down her right arm and causes her thumb to be numb. She has been dropping things with her right hand. 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 She also plays guitar and will notice she drops her pick. She describes it as an ache and burning. She has failed narcotics. The patient was evaluated in the surgeon's office and it was determined that the most appropriate plan of care is to proceed with surgical intervention. Patient's PCP Mary Kate Bonilla MD      Review of Systems     Constitutional: Negative for chills and fever  HENT: Negative for congestion and sore throat  Eyes: negative for blurred vision and double vision  Respiratory: Negative for cough, shortness of breath and wheezing  Mouth: Negative for loose, broken or chipped teeth. Negative for dentures  Cardiovascular: Negative for chest pain and palpitations  Gastrointestinal: Negative for abdominal pain, constipation, diarrhea and nausea  Genitourinary: Negative for dysuria and hematuria  Musculoskeletal: Positive for neck pain  Skin: Negative for rash, open wounds. Negative for bruises easily  Neurological: Negative for dizziness, tremors and headaches  Psychiatric: Negative for depression.  The patient is not nervous/anxious. Inherent Risk of Surgery     Surgical risk:  Intermediate  Low:  Intermediate:  Orthopedic, High:    Patient Cardiac Risk Assessment     Revised Cardiac Risk Index (RCRI)    Rate if cardiac death, nonfatal MI, nonfatal cardiac arrest by number of risk factor- None - 0.4%    MOE/AHA 2007 Guidelines:   1) Surgery Emergency, Non-cardiac -> to surgery  2) If not, look at clinical predictors    Major Intermediate Minor       Blood Thinner: none    METS     >4 METS Climb a flight of stairs or a hill Walk on level ground at 4 mph Run a short distance Heavy work around house (scrub floors, move furniture)     Other Risk Factors:   Screening for ETOH use:  Done and low risk  Smoking status:   Vaping    Personal or FH of bleeding problems:  No  Personal or FH of blood clots:  No  Personal or FH of anesthesia problems:   No    Pulmonary Risk:  Asthma or COPD:  No  Body mass index is 34.78 kg/m². Known LEIGHTON:  No  Albumin normal, BUN normal    Past Medical, Surgical, Social History     Allergies: Allergies   Allergen Reactions    Wellbutrin [Bupropion Hcl] Other (comments)     manic     Latex allergy: no      Current Outpatient Medications   Medication Sig    SUMAtriptan (IMITREX) 100 mg tablet Take 100 mg by mouth as needed for Migraine.  NAPROXEN PO Take 1 Tab by mouth daily as needed.  tiZANidine (ZANAFLEX) 4 mg tablet Take 4 mg by mouth nightly.  gabapentin (NEURONTIN) 300 mg capsule Take 600 mg by mouth nightly.  fremanezumab-vfrm (AJOVY) 225 mg/1.5 mL syrg by SubCUTAneous route every thirty (30) days. Indications: Migraine Prevention    lidocaine (LIDODERM) 5 % 1 Patch by TransDERmal route as needed. Apply patch to the affected area for 12 hours a day and remove for 12 hours a day.  esomeprazole magnesium (NEXIUM PO) Take 1 Cap by mouth as needed.     [START ON 7/25/2019] oxyCODONE-acetaminophen (PERCOCET) 7.5-325 mg per tablet Take 1 Tab by mouth every eight (8) hours as needed for Pain for up to 30 days. Max Daily Amount: 3 Tabs. Indications: Pain    traZODone (DESYREL) 100 mg tablet Take 100 mg by mouth nightly.  venlafaxine-SR (EFFEXOR XR) 150 mg capsule Take 150 mg by mouth daily.  cholecalciferol, VITAMIN D3, (VITAMIN D3) 5,000 unit tab tablet Take 2 Tabs by mouth daily for 30 days. Indications: vitamin D deficiency     No current facility-administered medications for this encounter. Past Medical History:   Diagnosis Date    Depression     GERD (gastroesophageal reflux disease)     Hypoglycemia     Insomnia     Migraines     Nicotine vapor product user     6%    Obesity, Class I, BMI 30-34.9     Tinnitus      Past Surgical History:   Procedure Laterality Date    HX ADENOIDECTOMY N/A 1978    HX ANKLE FRACTURE TX Left 1998    HX CHOLECYSTECTOMY  2010    HX COLONOSCOPY      HX ENDOSCOPY      HX SHOULDER ARTHROSCOPY Right 0323,8939     Social History     Tobacco Use    Smoking status: Former Smoker    Smokeless tobacco: Never Used   Substance Use Topics    Alcohol use: No    Drug use: No       Objective     Vitals:    07/22/19 0856   BP: 136/76   Pulse: 81   Resp: 16   Temp: 98.1 °F (36.7 °C)   SpO2: 96%   Weight: 106.8 kg (235 lb 8 oz)   Height: 5' 9\" (1.753 m)       Constitutional:  Appears well,  No Acute Distress, Vitals noted  Psychiatric:   Affect normal, Alert and Oriented to person/place/time    Eyes:   Pupils equally round and reactive, EOMI, conjunctiva clear, eyelids normal  ENT:   External ears and nose normal/lips, teeth normal, gums normal, TMs and Orophyarynx normal  Neck:   general inspection and Thyroid normal.  No abnormal cervical or supraclavicular nodes    Lungs:   clear to auscultation, good respiratory effort  Heart: Ausculation normal.  Regular rhythm. No cardiac murmurs.   No carotid bruits or palpable thrills  Chest wall normal  Musculoskeletal:  weak right hand  Extremities:   without edema, good peripheral pulses  Skin:   Warm to palpation, without rashes, bruising, or suspicious lesions     Recent Results (from the past 72 hour(s))   CBC WITH AUTOMATED DIFF    Collection Time: 07/22/19  9:31 AM   Result Value Ref Range    WBC 7.0 3.6 - 11.0 K/uL    RBC 5.02 3.80 - 5.20 M/uL    HGB 15.4 11.5 - 16.0 g/dL    HCT 44.2 35.0 - 47.0 %    MCV 88.0 80.0 - 99.0 FL    MCH 30.7 26.0 - 34.0 PG    MCHC 34.8 30.0 - 36.5 g/dL    RDW 12.4 11.5 - 14.5 %    PLATELET 598 090 - 701 K/uL    MPV 11.0 8.9 - 12.9 FL    NRBC 0.0 0  WBC    ABSOLUTE NRBC 0.00 0.00 - 0.01 K/uL    NEUTROPHILS 58 32 - 75 %    LYMPHOCYTES 27 12 - 49 %    MONOCYTES 9 5 - 13 %    EOSINOPHILS 5 0 - 7 %    BASOPHILS 1 0 - 1 %    IMMATURE GRANULOCYTES 0 0.0 - 0.5 %    ABS. NEUTROPHILS 4.1 1.8 - 8.0 K/UL    ABS. LYMPHOCYTES 1.9 0.8 - 3.5 K/UL    ABS. MONOCYTES 0.6 0.0 - 1.0 K/UL    ABS. EOSINOPHILS 0.3 0.0 - 0.4 K/UL    ABS. BASOPHILS 0.1 0.0 - 0.1 K/UL    ABS. IMM. GRANS. 0.0 0.00 - 0.04 K/UL    DF AUTOMATED     METABOLIC PANEL, COMPREHENSIVE    Collection Time: 07/22/19  9:31 AM   Result Value Ref Range    Sodium 139 136 - 145 mmol/L    Potassium 4.7 3.5 - 5.1 mmol/L    Chloride 107 97 - 108 mmol/L    CO2 29 21 - 32 mmol/L    Anion gap 3 (L) 5 - 15 mmol/L    Glucose 115 (H) 65 - 100 mg/dL    BUN 12 6 - 20 MG/DL    Creatinine 0.84 0.55 - 1.02 MG/DL    BUN/Creatinine ratio 14 12 - 20      GFR est AA >60 >60 ml/min/1.73m2    GFR est non-AA >60 >60 ml/min/1.73m2    Calcium 9.3 8.5 - 10.1 MG/DL    Bilirubin, total 0.4 0.2 - 1.0 MG/DL    ALT (SGPT) 26 12 - 78 U/L    AST (SGOT) 28 15 - 37 U/L    Alk.  phosphatase 66 45 - 117 U/L    Protein, total 7.1 6.4 - 8.2 g/dL    Albumin 4.1 3.5 - 5.0 g/dL    Globulin 3.0 2.0 - 4.0 g/dL    A-G Ratio 1.4 1.1 - 2.2     HEMOGLOBIN A1C WITH EAG    Collection Time: 07/22/19  9:31 AM   Result Value Ref Range    Hemoglobin A1c 5.6 4.2 - 6.3 %    Est. average glucose 114 mg/dL   CULTURE, MRSA    Collection Time: 07/22/19  9:31 AM   Result Value Ref Range Special Requests: NO SPECIAL REQUESTS      Culture result: MRSA NOT PRESENT AT 18 HOURS     PROTHROMBIN TIME + INR    Collection Time: 07/22/19  9:31 AM   Result Value Ref Range    INR 1.0 0.9 - 1.1      Prothrombin time 10.2 9.0 - 11.1 sec   PTT    Collection Time: 07/22/19  9:31 AM   Result Value Ref Range    aPTT 26.2 22.1 - 32.0 sec    aPTT, therapeutic range     58.0 - 77.0 SECS   URINALYSIS W/ REFLEX CULTURE    Collection Time: 07/22/19  9:31 AM   Result Value Ref Range    Color YELLOW/STRAW      Appearance CLEAR CLEAR      Specific gravity 1.016 1.003 - 1.030      pH (UA) 5.0 5.0 - 8.0      Protein NEGATIVE  NEG mg/dL    Glucose NEGATIVE  NEG mg/dL    Ketone NEGATIVE  NEG mg/dL    Bilirubin NEGATIVE  NEG      Blood TRACE (A) NEG      Urobilinogen 0.2 0.2 - 1.0 EU/dL    Nitrites NEGATIVE  NEG      Leukocyte Esterase NEGATIVE  NEG      WBC 0-4 0 - 4 /hpf    RBC 0-5 0 - 5 /hpf    Epithelial cells FEW FEW /lpf    Bacteria NEGATIVE  NEG /hpf    UA:UC IF INDICATED CULTURE NOT INDICATED BY UA RESULT CNI      Hyaline cast 2-5 0 - 5 /lpf   VITAMIN D, 25 HYDROXY    Collection Time: 07/22/19  9:31 AM   Result Value Ref Range    Vitamin D 25-Hydroxy 20.0 (L) 30 - 100 ng/mL   TYPE & SCREEN    Collection Time: 07/22/19  9:31 AM   Result Value Ref Range    Crossmatch Expiration 08/01/2019     ABO/Rh(D) B POSITIVE     Antibody screen NEG    EKG, 12 LEAD, INITIAL    Collection Time: 07/22/19  9:52 AM   Result Value Ref Range    Ventricular Rate 68 BPM    Atrial Rate 68 BPM    P-R Interval 136 ms    QRS Duration 84 ms    Q-T Interval 380 ms    QTC Calculation (Bezet) 404 ms    Calculated P Axis 20 degrees    Calculated R Axis -2 degrees    Diagnosis       Normal sinus rhythm with sinus arrhythmia  Normal ECG  No previous ECGs available  Confirmed by Phil Blevins MD., Mell Walden ([id number]) on 7/23/2019 12:32:56 AM         Assessment and Plan     Assessment/Plan:   1) Cervical Stenosis  2) Pre-Operative Evaluation    Labs and EKG reviewed. MRSA pending  Vitamin D low- script sent to pharmacy    Preoperative Clearance  Per RCRI, the patient has a 0.4% risk of cardiac death, nonfatal MI, nonfatal cardiac arrest based on no risk factors. Per ACC/AHA guidelines, patient is low risk for a(n) intermedaite risk surgery and may proceed to planned surgery with the above noted risk.     Roseline Damon NP

## 2019-07-23 LAB
ATRIAL RATE: 68 BPM
BACTERIA SPEC CULT: NORMAL
BACTERIA SPEC CULT: NORMAL
CALCULATED P AXIS, ECG09: 20 DEGREES
CALCULATED R AXIS, ECG10: -2 DEGREES
DIAGNOSIS, 93000: NORMAL
P-R INTERVAL, ECG05: 136 MS
Q-T INTERVAL, ECG07: 380 MS
QRS DURATION, ECG06: 84 MS
QTC CALCULATION (BEZET), ECG08: 404 MS
SERVICE CMNT-IMP: NORMAL
VENTRICULAR RATE, ECG03: 68 BPM

## 2019-07-23 RX ORDER — CHOLECALCIFEROL TAB 125 MCG (5000 UNIT) 125 MCG
10000 TAB ORAL DAILY
Qty: 60 TAB | Refills: 0 | Status: SHIPPED | OUTPATIENT
Start: 2019-07-23 | End: 2019-08-22

## 2019-07-26 ENCOUNTER — ANESTHESIA EVENT (OUTPATIENT)
Dept: SURGERY | Age: 49
End: 2019-07-26
Payer: COMMERCIAL

## 2019-07-29 ENCOUNTER — HOSPITAL ENCOUNTER (OUTPATIENT)
Age: 49
Setting detail: OBSERVATION
Discharge: HOME | End: 2019-07-30
Attending: ORTHOPAEDIC SURGERY | Admitting: ORTHOPAEDIC SURGERY
Payer: COMMERCIAL

## 2019-07-29 ENCOUNTER — ANESTHESIA (OUTPATIENT)
Dept: SURGERY | Age: 49
End: 2019-07-29
Payer: COMMERCIAL

## 2019-07-29 ENCOUNTER — APPOINTMENT (OUTPATIENT)
Dept: GENERAL RADIOLOGY | Age: 49
End: 2019-07-29
Attending: ORTHOPAEDIC SURGERY
Payer: COMMERCIAL

## 2019-07-29 DIAGNOSIS — R52 ACUTE PAIN: Primary | ICD-10-CM

## 2019-07-29 PROBLEM — M48.02 CERVICAL STENOSIS OF SPINAL CANAL: Status: ACTIVE | Noted: 2019-07-29

## 2019-07-29 LAB — HCG UR QL: NEGATIVE

## 2019-07-29 PROCEDURE — 77030037302 HC SPCR CERV LORDTC INLC -G: Performed by: ORTHOPAEDIC SURGERY

## 2019-07-29 PROCEDURE — 74011250636 HC RX REV CODE- 250/636: Performed by: ANESTHESIOLOGY

## 2019-07-29 PROCEDURE — 77030013079 HC BLNKT BAIR HGGR 3M -A: Performed by: ANESTHESIOLOGY

## 2019-07-29 PROCEDURE — 74011000272 HC RX REV CODE- 272: Performed by: ORTHOPAEDIC SURGERY

## 2019-07-29 PROCEDURE — C1713 ANCHOR/SCREW BN/BN,TIS/BN: HCPCS | Performed by: ORTHOPAEDIC SURGERY

## 2019-07-29 PROCEDURE — 77030011640 HC PAD GRND REM COVD -A: Performed by: ORTHOPAEDIC SURGERY

## 2019-07-29 PROCEDURE — 77030019908 HC STETH ESOPH SIMS -A: Performed by: ANESTHESIOLOGY

## 2019-07-29 PROCEDURE — 74011250636 HC RX REV CODE- 250/636

## 2019-07-29 PROCEDURE — 77030031139 HC SUT VCRL2 J&J -A: Performed by: ORTHOPAEDIC SURGERY

## 2019-07-29 PROCEDURE — 77030020782 HC GWN BAIR PAWS FLX 3M -B

## 2019-07-29 PROCEDURE — 74011000250 HC RX REV CODE- 250: Performed by: ORTHOPAEDIC SURGERY

## 2019-07-29 PROCEDURE — 74011250636 HC RX REV CODE- 250/636: Performed by: ORTHOPAEDIC SURGERY

## 2019-07-29 PROCEDURE — 77030040356 HC CORD BPLR FRCP COVD -A: Performed by: ORTHOPAEDIC SURGERY

## 2019-07-29 PROCEDURE — 77030018673: Performed by: ORTHOPAEDIC SURGERY

## 2019-07-29 PROCEDURE — 74011250637 HC RX REV CODE- 250/637: Performed by: ORTHOPAEDIC SURGERY

## 2019-07-29 PROCEDURE — 81025 URINE PREGNANCY TEST: CPT

## 2019-07-29 PROCEDURE — 77030018846 HC SOL IRR STRL H20 ICUM -A: Performed by: ORTHOPAEDIC SURGERY

## 2019-07-29 PROCEDURE — 99218 HC RM OBSERVATION: CPT

## 2019-07-29 PROCEDURE — 76060000035 HC ANESTHESIA 2 TO 2.5 HR: Performed by: ORTHOPAEDIC SURGERY

## 2019-07-29 PROCEDURE — 77030012406 HC DRN WND PENRS BARD -A: Performed by: ORTHOPAEDIC SURGERY

## 2019-07-29 PROCEDURE — 77030040361 HC SLV COMPR DVT MDII -B

## 2019-07-29 PROCEDURE — 77030026438 HC STYL ET INTUB CARD -A: Performed by: ANESTHESIOLOGY

## 2019-07-29 PROCEDURE — 77030008684 HC TU ET CUF COVD -B: Performed by: ANESTHESIOLOGY

## 2019-07-29 PROCEDURE — 76010000171 HC OR TIME 2 TO 2.5 HR INTENSV-TIER 1: Performed by: ORTHOPAEDIC SURGERY

## 2019-07-29 PROCEDURE — 77030030102 HC BIT DRL PYRNES K2M -B: Performed by: ORTHOPAEDIC SURGERY

## 2019-07-29 PROCEDURE — 76210000017 HC OR PH I REC 1.5 TO 2 HR: Performed by: ORTHOPAEDIC SURGERY

## 2019-07-29 PROCEDURE — 77030002933 HC SUT MCRYL J&J -A: Performed by: ORTHOPAEDIC SURGERY

## 2019-07-29 PROCEDURE — 77030011267 HC ELECTRD BLD COVD -A: Performed by: ORTHOPAEDIC SURGERY

## 2019-07-29 PROCEDURE — 77030018836 HC SOL IRR NACL ICUM -A: Performed by: ORTHOPAEDIC SURGERY

## 2019-07-29 PROCEDURE — 74011000250 HC RX REV CODE- 250

## 2019-07-29 PROCEDURE — 77030029099 HC BN WAX SSPC -A: Performed by: ORTHOPAEDIC SURGERY

## 2019-07-29 PROCEDURE — 77030003666 HC NDL SPINAL BD -A: Performed by: ORTHOPAEDIC SURGERY

## 2019-07-29 PROCEDURE — 77030004391 HC BUR FLUT MEDT -C: Performed by: ORTHOPAEDIC SURGERY

## 2019-07-29 DEVICE — PLATE SPNL L38MM LEV 2 CERV CONSTRN PYRENEES: Type: IMPLANTABLE DEVICE | Site: SPINE CERVICAL | Status: FUNCTIONAL

## 2019-07-29 DEVICE — SCREW SPNL L14MM DIA4MM CERV ST CONSTRN PYRENEES: Type: IMPLANTABLE DEVICE | Site: SPINE CERVICAL | Status: FUNCTIONAL

## 2019-07-29 DEVICE — IMPLANTABLE DEVICE: Type: IMPLANTABLE DEVICE | Site: SPINE CERVICAL | Status: FUNCTIONAL

## 2019-07-29 RX ORDER — HYDROMORPHONE HYDROCHLORIDE 1 MG/ML
0.5 INJECTION, SOLUTION INTRAMUSCULAR; INTRAVENOUS; SUBCUTANEOUS
Status: ACTIVE | OUTPATIENT
Start: 2019-07-29 | End: 2019-07-30

## 2019-07-29 RX ORDER — OXYCODONE HYDROCHLORIDE 5 MG/1
10 TABLET ORAL
Status: DISCONTINUED | OUTPATIENT
Start: 2019-07-29 | End: 2019-07-30 | Stop reason: HOSPADM

## 2019-07-29 RX ORDER — CYCLOBENZAPRINE HCL 10 MG
10 TABLET ORAL
Status: DISCONTINUED | OUTPATIENT
Start: 2019-07-29 | End: 2019-07-30 | Stop reason: HOSPADM

## 2019-07-29 RX ORDER — CEFAZOLIN SODIUM/WATER 2 G/20 ML
2 SYRINGE (ML) INTRAVENOUS EVERY 8 HOURS
Status: COMPLETED | OUTPATIENT
Start: 2019-07-29 | End: 2019-07-30

## 2019-07-29 RX ORDER — CHOLECALCIFEROL TAB 125 MCG (5000 UNIT) 125 MCG
10000 TAB ORAL DAILY
Status: DISCONTINUED | OUTPATIENT
Start: 2019-07-30 | End: 2019-07-30 | Stop reason: HOSPADM

## 2019-07-29 RX ORDER — TRAZODONE HYDROCHLORIDE 100 MG/1
100 TABLET ORAL
Status: DISCONTINUED | OUTPATIENT
Start: 2019-07-29 | End: 2019-07-30 | Stop reason: HOSPADM

## 2019-07-29 RX ORDER — LIDOCAINE HYDROCHLORIDE 10 MG/ML
0.1 INJECTION, SOLUTION EPIDURAL; INFILTRATION; INTRACAUDAL; PERINEURAL AS NEEDED
Status: DISCONTINUED | OUTPATIENT
Start: 2019-07-29 | End: 2019-07-29 | Stop reason: HOSPADM

## 2019-07-29 RX ORDER — DIPHENHYDRAMINE HYDROCHLORIDE 50 MG/ML
12.5 INJECTION, SOLUTION INTRAMUSCULAR; INTRAVENOUS
Status: DISCONTINUED | OUTPATIENT
Start: 2019-07-29 | End: 2019-07-30 | Stop reason: HOSPADM

## 2019-07-29 RX ORDER — NALOXONE HYDROCHLORIDE 0.4 MG/ML
0.4 INJECTION, SOLUTION INTRAMUSCULAR; INTRAVENOUS; SUBCUTANEOUS AS NEEDED
Status: DISCONTINUED | OUTPATIENT
Start: 2019-07-29 | End: 2019-07-30 | Stop reason: HOSPADM

## 2019-07-29 RX ORDER — ALBUTEROL SULFATE 0.83 MG/ML
2.5 SOLUTION RESPIRATORY (INHALATION) AS NEEDED
Status: DISCONTINUED | OUTPATIENT
Start: 2019-07-29 | End: 2019-07-29 | Stop reason: HOSPADM

## 2019-07-29 RX ORDER — AMOXICILLIN 250 MG
1 CAPSULE ORAL 2 TIMES DAILY
Status: DISCONTINUED | OUTPATIENT
Start: 2019-07-30 | End: 2019-07-30 | Stop reason: HOSPADM

## 2019-07-29 RX ORDER — SODIUM CHLORIDE, SODIUM LACTATE, POTASSIUM CHLORIDE, CALCIUM CHLORIDE 600; 310; 30; 20 MG/100ML; MG/100ML; MG/100ML; MG/100ML
125 INJECTION, SOLUTION INTRAVENOUS CONTINUOUS
Status: DISCONTINUED | OUTPATIENT
Start: 2019-07-29 | End: 2019-07-29 | Stop reason: HOSPADM

## 2019-07-29 RX ORDER — HYDROMORPHONE HYDROCHLORIDE 2 MG/ML
INJECTION, SOLUTION INTRAMUSCULAR; INTRAVENOUS; SUBCUTANEOUS AS NEEDED
Status: DISCONTINUED | OUTPATIENT
Start: 2019-07-29 | End: 2019-07-29 | Stop reason: HOSPADM

## 2019-07-29 RX ORDER — POLYETHYLENE GLYCOL 3350 17 G/17G
17 POWDER, FOR SOLUTION ORAL DAILY
Status: DISCONTINUED | OUTPATIENT
Start: 2019-07-30 | End: 2019-07-30 | Stop reason: HOSPADM

## 2019-07-29 RX ORDER — PANTOPRAZOLE SODIUM 40 MG/1
40 TABLET, DELAYED RELEASE ORAL
Status: DISCONTINUED | OUTPATIENT
Start: 2019-07-29 | End: 2019-07-30 | Stop reason: HOSPADM

## 2019-07-29 RX ORDER — SODIUM CHLORIDE, SODIUM LACTATE, POTASSIUM CHLORIDE, CALCIUM CHLORIDE 600; 310; 30; 20 MG/100ML; MG/100ML; MG/100ML; MG/100ML
150 INJECTION, SOLUTION INTRAVENOUS CONTINUOUS
Status: DISCONTINUED | OUTPATIENT
Start: 2019-07-29 | End: 2019-07-29 | Stop reason: HOSPADM

## 2019-07-29 RX ORDER — FENTANYL CITRATE 50 UG/ML
INJECTION, SOLUTION INTRAMUSCULAR; INTRAVENOUS AS NEEDED
Status: DISCONTINUED | OUTPATIENT
Start: 2019-07-29 | End: 2019-07-29 | Stop reason: HOSPADM

## 2019-07-29 RX ORDER — OXYCODONE HYDROCHLORIDE 5 MG/1
5 TABLET ORAL
Status: DISCONTINUED | OUTPATIENT
Start: 2019-07-29 | End: 2019-07-30 | Stop reason: HOSPADM

## 2019-07-29 RX ORDER — VENLAFAXINE HYDROCHLORIDE 150 MG/1
150 CAPSULE, EXTENDED RELEASE ORAL
Status: DISCONTINUED | OUTPATIENT
Start: 2019-07-29 | End: 2019-07-30 | Stop reason: HOSPADM

## 2019-07-29 RX ORDER — ROCURONIUM BROMIDE 10 MG/ML
INJECTION, SOLUTION INTRAVENOUS AS NEEDED
Status: DISCONTINUED | OUTPATIENT
Start: 2019-07-29 | End: 2019-07-29 | Stop reason: HOSPADM

## 2019-07-29 RX ORDER — CEFAZOLIN SODIUM/WATER 2 G/20 ML
2 SYRINGE (ML) INTRAVENOUS ONCE
Status: COMPLETED | OUTPATIENT
Start: 2019-07-29 | End: 2019-07-29

## 2019-07-29 RX ORDER — ONDANSETRON 2 MG/ML
INJECTION INTRAMUSCULAR; INTRAVENOUS AS NEEDED
Status: DISCONTINUED | OUTPATIENT
Start: 2019-07-29 | End: 2019-07-29 | Stop reason: HOSPADM

## 2019-07-29 RX ORDER — DIPHENHYDRAMINE HYDROCHLORIDE 50 MG/ML
12.5 INJECTION, SOLUTION INTRAMUSCULAR; INTRAVENOUS AS NEEDED
Status: DISCONTINUED | OUTPATIENT
Start: 2019-07-29 | End: 2019-07-29 | Stop reason: HOSPADM

## 2019-07-29 RX ORDER — ONDANSETRON 2 MG/ML
4 INJECTION INTRAMUSCULAR; INTRAVENOUS AS NEEDED
Status: DISCONTINUED | OUTPATIENT
Start: 2019-07-29 | End: 2019-07-29 | Stop reason: HOSPADM

## 2019-07-29 RX ORDER — SODIUM CHLORIDE 0.9 % (FLUSH) 0.9 %
5-40 SYRINGE (ML) INJECTION EVERY 8 HOURS
Status: DISCONTINUED | OUTPATIENT
Start: 2019-07-29 | End: 2019-07-30 | Stop reason: HOSPADM

## 2019-07-29 RX ORDER — NEOSTIGMINE METHYLSULFATE 1 MG/ML
INJECTION INTRAVENOUS AS NEEDED
Status: DISCONTINUED | OUTPATIENT
Start: 2019-07-29 | End: 2019-07-29 | Stop reason: HOSPADM

## 2019-07-29 RX ORDER — SODIUM CHLORIDE 9 MG/ML
125 INJECTION, SOLUTION INTRAVENOUS CONTINUOUS
Status: DISPENSED | OUTPATIENT
Start: 2019-07-29 | End: 2019-07-30

## 2019-07-29 RX ORDER — ACETAMINOPHEN 325 MG/1
650 TABLET ORAL
Status: DISCONTINUED | OUTPATIENT
Start: 2019-07-29 | End: 2019-07-30 | Stop reason: HOSPADM

## 2019-07-29 RX ORDER — HYDROMORPHONE HCL/0.9% NACL/PF 0.5 MG/ML
PLASTIC BAG, INJECTION (ML) INTRAVENOUS
Status: DISPENSED | OUTPATIENT
Start: 2019-07-29 | End: 2019-07-30

## 2019-07-29 RX ORDER — FAMOTIDINE 20 MG/1
20 TABLET, FILM COATED ORAL 2 TIMES DAILY
Status: DISCONTINUED | OUTPATIENT
Start: 2019-07-29 | End: 2019-07-30 | Stop reason: HOSPADM

## 2019-07-29 RX ORDER — ONDANSETRON 2 MG/ML
4 INJECTION INTRAMUSCULAR; INTRAVENOUS
Status: ACTIVE | OUTPATIENT
Start: 2019-07-29 | End: 2019-07-30

## 2019-07-29 RX ORDER — DEXAMETHASONE SODIUM PHOSPHATE 4 MG/ML
INJECTION, SOLUTION INTRA-ARTICULAR; INTRALESIONAL; INTRAMUSCULAR; INTRAVENOUS; SOFT TISSUE AS NEEDED
Status: DISCONTINUED | OUTPATIENT
Start: 2019-07-29 | End: 2019-07-29 | Stop reason: HOSPADM

## 2019-07-29 RX ORDER — PROPOFOL 10 MG/ML
INJECTION, EMULSION INTRAVENOUS AS NEEDED
Status: DISCONTINUED | OUTPATIENT
Start: 2019-07-29 | End: 2019-07-29 | Stop reason: HOSPADM

## 2019-07-29 RX ORDER — FACIAL-BODY WIPES
10 EACH TOPICAL DAILY PRN
Status: DISCONTINUED | OUTPATIENT
Start: 2019-07-31 | End: 2019-07-30 | Stop reason: HOSPADM

## 2019-07-29 RX ORDER — LIDOCAINE HYDROCHLORIDE 20 MG/ML
INJECTION, SOLUTION EPIDURAL; INFILTRATION; INTRACAUDAL; PERINEURAL AS NEEDED
Status: DISCONTINUED | OUTPATIENT
Start: 2019-07-29 | End: 2019-07-29 | Stop reason: HOSPADM

## 2019-07-29 RX ORDER — SUMATRIPTAN 25 MG/1
100 TABLET, FILM COATED ORAL AS NEEDED
Status: DISCONTINUED | OUTPATIENT
Start: 2019-07-29 | End: 2019-07-30 | Stop reason: HOSPADM

## 2019-07-29 RX ORDER — SUCCINYLCHOLINE CHLORIDE 20 MG/ML
INJECTION INTRAMUSCULAR; INTRAVENOUS AS NEEDED
Status: DISCONTINUED | OUTPATIENT
Start: 2019-07-29 | End: 2019-07-29 | Stop reason: HOSPADM

## 2019-07-29 RX ORDER — MIDAZOLAM HYDROCHLORIDE 1 MG/ML
INJECTION, SOLUTION INTRAMUSCULAR; INTRAVENOUS AS NEEDED
Status: DISCONTINUED | OUTPATIENT
Start: 2019-07-29 | End: 2019-07-29 | Stop reason: HOSPADM

## 2019-07-29 RX ORDER — HYDROMORPHONE HYDROCHLORIDE 1 MG/ML
0.5 INJECTION, SOLUTION INTRAMUSCULAR; INTRAVENOUS; SUBCUTANEOUS
Status: DISCONTINUED | OUTPATIENT
Start: 2019-07-29 | End: 2019-07-29 | Stop reason: HOSPADM

## 2019-07-29 RX ORDER — PROPOFOL 10 MG/ML
INJECTION, EMULSION INTRAVENOUS
Status: DISCONTINUED | OUTPATIENT
Start: 2019-07-29 | End: 2019-07-29 | Stop reason: HOSPADM

## 2019-07-29 RX ORDER — GABAPENTIN 300 MG/1
600 CAPSULE ORAL
Status: DISCONTINUED | OUTPATIENT
Start: 2019-07-29 | End: 2019-07-30 | Stop reason: HOSPADM

## 2019-07-29 RX ORDER — SODIUM CHLORIDE 0.9 % (FLUSH) 0.9 %
5-40 SYRINGE (ML) INJECTION AS NEEDED
Status: DISCONTINUED | OUTPATIENT
Start: 2019-07-29 | End: 2019-07-30 | Stop reason: HOSPADM

## 2019-07-29 RX ORDER — GLYCOPYRROLATE 0.2 MG/ML
INJECTION INTRAMUSCULAR; INTRAVENOUS AS NEEDED
Status: DISCONTINUED | OUTPATIENT
Start: 2019-07-29 | End: 2019-07-29 | Stop reason: HOSPADM

## 2019-07-29 RX ADMIN — SODIUM CHLORIDE 125 ML/HR: 900 INJECTION, SOLUTION INTRAVENOUS at 13:28

## 2019-07-29 RX ADMIN — GABAPENTIN 600 MG: 300 CAPSULE ORAL at 20:57

## 2019-07-29 RX ADMIN — FENTANYL CITRATE 150 MCG: 50 INJECTION, SOLUTION INTRAMUSCULAR; INTRAVENOUS at 10:54

## 2019-07-29 RX ADMIN — MIDAZOLAM HYDROCHLORIDE 2 MG: 1 INJECTION, SOLUTION INTRAMUSCULAR; INTRAVENOUS at 10:51

## 2019-07-29 RX ADMIN — DEXAMETHASONE SODIUM PHOSPHATE 8 MG: 4 INJECTION, SOLUTION INTRA-ARTICULAR; INTRALESIONAL; INTRAMUSCULAR; INTRAVENOUS; SOFT TISSUE at 11:06

## 2019-07-29 RX ADMIN — PROPOFOL 20 MG: 10 INJECTION, EMULSION INTRAVENOUS at 10:59

## 2019-07-29 RX ADMIN — OXYCODONE HYDROCHLORIDE 5 MG: 5 TABLET ORAL at 20:57

## 2019-07-29 RX ADMIN — SODIUM CHLORIDE, SODIUM LACTATE, POTASSIUM CHLORIDE, AND CALCIUM CHLORIDE: 600; 310; 30; 20 INJECTION, SOLUTION INTRAVENOUS at 12:25

## 2019-07-29 RX ADMIN — PROPOFOL 100 MCG/KG/MIN: 10 INJECTION, EMULSION INTRAVENOUS at 10:58

## 2019-07-29 RX ADMIN — SODIUM CHLORIDE 125 ML/HR: 900 INJECTION, SOLUTION INTRAVENOUS at 20:56

## 2019-07-29 RX ADMIN — FENTANYL CITRATE 100 MCG: 50 INJECTION, SOLUTION INTRAMUSCULAR; INTRAVENOUS at 10:59

## 2019-07-29 RX ADMIN — ROCURONIUM BROMIDE 35 MG: 10 INJECTION, SOLUTION INTRAVENOUS at 11:26

## 2019-07-29 RX ADMIN — HYDROMORPHONE HYDROCHLORIDE 0.5 MG: 2 INJECTION, SOLUTION INTRAMUSCULAR; INTRAVENOUS; SUBCUTANEOUS at 12:25

## 2019-07-29 RX ADMIN — Medication 2 G: at 17:31

## 2019-07-29 RX ADMIN — Medication 10 ML: at 20:57

## 2019-07-29 RX ADMIN — TRAZODONE HYDROCHLORIDE 100 MG: 100 TABLET ORAL at 20:57

## 2019-07-29 RX ADMIN — HYDROMORPHONE HYDROCHLORIDE 0.5 MG: 1 INJECTION, SOLUTION INTRAMUSCULAR; INTRAVENOUS; SUBCUTANEOUS at 13:41

## 2019-07-29 RX ADMIN — ONDANSETRON 4 MG: 2 INJECTION INTRAMUSCULAR; INTRAVENOUS at 12:35

## 2019-07-29 RX ADMIN — HYDROMORPHONE HYDROCHLORIDE 1 MG: 2 INJECTION, SOLUTION INTRAMUSCULAR; INTRAVENOUS; SUBCUTANEOUS at 13:07

## 2019-07-29 RX ADMIN — Medication: at 13:35

## 2019-07-29 RX ADMIN — LIDOCAINE HYDROCHLORIDE 100 MG: 20 INJECTION, SOLUTION EPIDURAL; INFILTRATION; INTRACAUDAL; PERINEURAL at 10:54

## 2019-07-29 RX ADMIN — PROPOFOL 180 MG: 10 INJECTION, EMULSION INTRAVENOUS at 10:54

## 2019-07-29 RX ADMIN — Medication 2 G: at 11:10

## 2019-07-29 RX ADMIN — NEOSTIGMINE METHYLSULFATE 3 MG: 1 INJECTION INTRAVENOUS at 12:19

## 2019-07-29 RX ADMIN — SODIUM CHLORIDE, SODIUM LACTATE, POTASSIUM CHLORIDE, AND CALCIUM CHLORIDE 150 ML/HR: 600; 310; 30; 20 INJECTION, SOLUTION INTRAVENOUS at 08:19

## 2019-07-29 RX ADMIN — SUCCINYLCHOLINE CHLORIDE 100 MG: 20 INJECTION INTRAMUSCULAR; INTRAVENOUS at 10:56

## 2019-07-29 RX ADMIN — ROCURONIUM BROMIDE 5 MG: 10 INJECTION, SOLUTION INTRAVENOUS at 10:54

## 2019-07-29 RX ADMIN — HYDROMORPHONE HYDROCHLORIDE 0.5 MG: 2 INJECTION, SOLUTION INTRAMUSCULAR; INTRAVENOUS; SUBCUTANEOUS at 12:18

## 2019-07-29 RX ADMIN — GLYCOPYRROLATE 0.4 MG: 0.2 INJECTION INTRAMUSCULAR; INTRAVENOUS at 12:19

## 2019-07-29 RX ADMIN — FAMOTIDINE 20 MG: 20 TABLET ORAL at 17:31

## 2019-07-29 RX ADMIN — CYCLOBENZAPRINE HYDROCHLORIDE 10 MG: 10 TABLET, FILM COATED ORAL at 20:56

## 2019-07-29 RX ADMIN — MIDAZOLAM HYDROCHLORIDE 3 MG: 1 INJECTION, SOLUTION INTRAMUSCULAR; INTRAVENOUS at 10:48

## 2019-07-29 RX ADMIN — Medication 10 ML: at 17:31

## 2019-07-29 RX ADMIN — VENLAFAXINE HYDROCHLORIDE 150 MG: 150 CAPSULE, EXTENDED RELEASE ORAL at 20:56

## 2019-07-29 NOTE — ANESTHESIA PREPROCEDURE EVALUATION
Relevant Problems   No relevant active problems       Anesthetic History   No history of anesthetic complications            Review of Systems / Medical History  Patient summary reviewed and pertinent labs reviewed    Pulmonary          Smoker (vape)         Neuro/Psych         Headaches and psychiatric history     Cardiovascular  Within defined limits                Exercise tolerance: >4 METS     GI/Hepatic/Renal     GERD: well controlled           Endo/Other        Morbid obesity and arthritis     Other Findings              Physical Exam    Airway  Mallampati: II  TM Distance: 4 - 6 cm  Neck ROM: normal range of motion   Mouth opening: Normal     Cardiovascular    Rhythm: regular  Rate: normal         Dental    Dentition: Upper dentition intact and Lower dentition intact     Pulmonary  Breath sounds clear to auscultation               Abdominal         Other Findings            Anesthetic Plan    ASA: 2  Anesthesia type: general          Induction: Intravenous  Anesthetic plan and risks discussed with: Patient

## 2019-07-29 NOTE — ANESTHESIA POSTPROCEDURE EVALUATION
Procedure(s):  C5-C7 ANTERIOR CERVICAL FUSION WITH INSTRUMENTATION. general    Anesthesia Post Evaluation      Multimodal analgesia: multimodal analgesia used between 6 hours prior to anesthesia start to PACU discharge  Patient location during evaluation: bedside  Patient participation: complete - patient participated  Level of consciousness: awake  Pain management: adequate  Airway patency: patent  Anesthetic complications: no  Cardiovascular status: acceptable  Respiratory status: acceptable  Hydration status: acceptable        Vitals Value Taken Time   /75 7/29/2019  2:25 PM   Temp 36.4 °C (97.6 °F) 7/29/2019  2:06 PM   Pulse 86 7/29/2019  2:28 PM   Resp 14 7/29/2019  2:28 PM   SpO2 94 % 7/29/2019  2:28 PM   Vitals shown include unvalidated device data.

## 2019-07-29 NOTE — OP NOTES
[address], [address] [address]    OPERATIVE REPORT      NAME: Bailey Menendez    AGE: 54 y.o. YOB: 1970    MEDICAL RECORD NUMBER: [id number]    DATE OF SURGERY: 7/29/2019    OPERATIVE REPORT     PREOPERATIVE DIAGNOSIS: Cervical stenosis     POSTOPERATIVE DIAGNOSIS: Cervical stenosis    OPERATIVE PROCEDURE: C5 to C7 anterior cervical diskectomy and fusion with instrumentation and application of interbody spacer at C5-C6 and C6-C7. SURGEON: Rachael Paulino MD     ASSISTANT: KAIT Lagunas    Specimens - no    ANESTHESIA: General    COMPLICATIONS: None    ESTIMATED BLOOD LOSS: 50 cc    INSTRUMENTATION: K2M plate, Seaspine spacer    NEUROMONITORING: SSEPs and spontaneous EMGs    INDICATION FOR PROCEDURE: The patient is a very pleasant 54 y.o. female with cervical stenosis. The patient elected to proceed with operative intervention. She was aware of the risks, benefits, and alternatives. She provided informed consent. PROCEDURE: The patient was identified in the preoperative holding area. The anterior cervical spine was marked by me. She was transferred to the operating room where general anesthesia was given. She was also given perioperative ancef antibiotics. The patient was placed supine on the operating room table. All bony prominences were well-padded. The shoulders were taped. The anterior cervical spine was prepped and draped in the usual standard fashion. We performed a surgical time-out. I made a skin incision on the left side. It was transverse. I exposed the anterior cervical spine. I placed a needle into the disk space to verify our levels. I exposed the disc spaces with electrocautery from uncus to uncus. I brought in the operating room microscope. I performed a diskectomy at C5-C6. I decompressed the spinal cord and nerve roots bilaterally. I prepared the endplates to bleeding bone. We had good hemostasis.  I performed trial sizing. I placed a spacer into C5-C6 with the appropriate amount of tension and alignment. I performed an identical procedure at C6-C7. The endplates were prepared to bleeding bone. The spinal cord and nerve roots were decompressed. I placed a spacer into C6-C7 with the appropriate amount of tension and alignment. The spacers had allograft. I then placed an anterior cervical plate into C5, C6, and C7. The screws were locked to the plate according to the manufacture's specification. We had good hemostasis. I copiously irrigated the entire wound. I placed a deep drain. The wound was closed with 3-0 Vicryl and 4-0 Monocryl. A sterile dressing was applied. The patient was extubated and transferred to the recovery room in good medical condition. The PA assisted with retraction and wound closure    I, Dr. Parish Gentile, performed the above procedures.      Parish Gentile MD  7/29/2019

## 2019-07-29 NOTE — H&P
Date of Surgery Update:  Ivana Martins was seen and examined. History and physical has been reviewed. The patient has been examined.  There have been no significant clinical changes since the completion of the originally dated History and Physical.    Signed By: Arabella Burnett MD     July 29, 2019 9:41 AM

## 2019-07-29 NOTE — PROGRESS NOTES
Problem: Falls - Risk of  Goal: *Absence of Falls  Description  Document Peg Foster Fall Risk and appropriate interventions in the flowsheet.   Outcome: Progressing Towards Goal  Note:   Fall Risk Interventions:            Medication Interventions: Patient to call before getting OOB                   Problem: Patient Education: Go to Patient Education Activity  Goal: Patient/Family Education  Outcome: Progressing Towards Goal

## 2019-07-30 VITALS
DIASTOLIC BLOOD PRESSURE: 60 MMHG | TEMPERATURE: 97.8 F | SYSTOLIC BLOOD PRESSURE: 126 MMHG | RESPIRATION RATE: 18 BRPM | OXYGEN SATURATION: 95 % | HEART RATE: 84 BPM

## 2019-07-30 LAB
ANION GAP SERPL CALC-SCNC: 5 MMOL/L (ref 5–15)
BUN SERPL-MCNC: 8 MG/DL (ref 6–20)
BUN/CREAT SERPL: 11 (ref 12–20)
CALCIUM SERPL-MCNC: 8.7 MG/DL (ref 8.5–10.1)
CHLORIDE SERPL-SCNC: 105 MMOL/L (ref 97–108)
CO2 SERPL-SCNC: 28 MMOL/L (ref 21–32)
CREAT SERPL-MCNC: 0.74 MG/DL (ref 0.55–1.02)
GLUCOSE BLD STRIP.AUTO-MCNC: 138 MG/DL (ref 65–100)
GLUCOSE BLD STRIP.AUTO-MCNC: 139 MG/DL (ref 65–100)
GLUCOSE SERPL-MCNC: 141 MG/DL (ref 65–100)
HGB BLD-MCNC: 13.7 G/DL (ref 11.5–16)
POTASSIUM SERPL-SCNC: 3.8 MMOL/L (ref 3.5–5.1)
SERVICE CMNT-IMP: ABNORMAL
SERVICE CMNT-IMP: ABNORMAL
SODIUM SERPL-SCNC: 138 MMOL/L (ref 136–145)

## 2019-07-30 PROCEDURE — 99218 HC RM OBSERVATION: CPT

## 2019-07-30 PROCEDURE — 74011250637 HC RX REV CODE- 250/637: Performed by: ORTHOPAEDIC SURGERY

## 2019-07-30 PROCEDURE — 94760 N-INVAS EAR/PLS OXIMETRY 1: CPT

## 2019-07-30 PROCEDURE — 85018 HEMOGLOBIN: CPT

## 2019-07-30 PROCEDURE — 80048 BASIC METABOLIC PNL TOTAL CA: CPT

## 2019-07-30 PROCEDURE — 82962 GLUCOSE BLOOD TEST: CPT

## 2019-07-30 PROCEDURE — 74011250636 HC RX REV CODE- 250/636: Performed by: ORTHOPAEDIC SURGERY

## 2019-07-30 PROCEDURE — 36415 COLL VENOUS BLD VENIPUNCTURE: CPT

## 2019-07-30 PROCEDURE — 77010033678 HC OXYGEN DAILY

## 2019-07-30 RX ORDER — OXYCODONE AND ACETAMINOPHEN 5; 325 MG/1; MG/1
1-2 TABLET ORAL
Qty: 50 TAB | Refills: 0 | Status: SHIPPED | OUTPATIENT
Start: 2019-07-30 | End: 2019-08-06

## 2019-07-30 RX ORDER — DOCUSATE SODIUM 100 MG/1
100 CAPSULE, LIQUID FILLED ORAL 2 TIMES DAILY
Qty: 60 CAP | Refills: 0 | Status: SHIPPED | OUTPATIENT
Start: 2019-07-30

## 2019-07-30 RX ADMIN — Medication 2 G: at 09:31

## 2019-07-30 RX ADMIN — SODIUM CHLORIDE 125 ML/HR: 900 INJECTION, SOLUTION INTRAVENOUS at 05:20

## 2019-07-30 RX ADMIN — Medication 2 G: at 00:03

## 2019-07-30 RX ADMIN — CHOLECALCIFEROL TAB 125 MCG (5000 UNIT) 10000 UNITS: 125 TAB at 09:31

## 2019-07-30 RX ADMIN — OXYCODONE HYDROCHLORIDE 10 MG: 5 TABLET ORAL at 00:06

## 2019-07-30 RX ADMIN — Medication 10 ML: at 14:00

## 2019-07-30 RX ADMIN — OXYCODONE HYDROCHLORIDE 5 MG: 5 TABLET ORAL at 13:14

## 2019-07-30 RX ADMIN — OXYCODONE HYDROCHLORIDE 5 MG: 5 TABLET ORAL at 04:14

## 2019-07-30 RX ADMIN — Medication 10 ML: at 05:04

## 2019-07-30 RX ADMIN — CYCLOBENZAPRINE HYDROCHLORIDE 10 MG: 10 TABLET, FILM COATED ORAL at 11:17

## 2019-07-30 RX ADMIN — FAMOTIDINE 20 MG: 20 TABLET ORAL at 09:32

## 2019-07-30 RX ADMIN — SENNOSIDES, DOCUSATE SODIUM 1 TABLET: 50; 8.6 TABLET, FILM COATED ORAL at 09:32

## 2019-07-30 RX ADMIN — POLYETHYLENE GLYCOL 3350 17 G: 17 POWDER, FOR SOLUTION ORAL at 09:31

## 2019-07-30 NOTE — DISCHARGE INSTRUCTIONS
Chris Pichardo MD  [address] [facility]  Office Phone: [phone]  Neck Surgery Discharge Instructions  Activities   You are going home a well person, be as active as possible. Your only exercise should be walking. Start with short frequent walks and increase your walking distance each day. Start with walking twice a day for 5 minutes and increase your distance each day 2-3 minutes until you reach 25 minutes twice a day. Limit the amount of time you sit to 20-30 minute intervals. Sitting for prolonged periods of time will be uncomfortable for you following your surgery.  Do not lift anything over five pounds, and do not do any bending or straining.  Avoid reaching overhead in this post-operative period   Do not do any neck exercises until you have been instructed by your doctor.  When you are in the bed, you may lay on your back or on either side. Do not lie on your stomach.  Continue using your incentive spirometer regularly for deep breathing exercises   You may resume sexual relations 3-4 weeks after your surgery, depending on how you are feeling. Diet   You may resume your normal diet. If your throat is sore, you may want to eat soft foods for a few days. Be sure to drink plenty of fluids, it is important to keep yourself hydrated. If you begin having trouble swallowing, call the office immediately.  Avoid alcoholic beverages and ABSOLUTELY NO tobacco products. Tobacco products will interfere with your healing. If you continue to use tobacco, you may end up needing another surgery in the future. Medications   Do not take anti-inflammatory medications or aspirin unless instructed by your physician.  Take your pain medication as directed.  Do NOT take additional Tylenol if your prescribed pain medication has acetaminophen in it (Endocet/Percocet, Lortab, Norco).  It is important to have regular bowel movements. Pain medications may cause constipation.   Stool softeners, prune juice, and increasing your water and fiber intake may help in preventing constipation.  Do NOT take laxatives if at all possible except in severe situations. It can results in a vicious cycle of constipation and diarrhea.  Do not be alarmed if you still have some of the same symptoms you had prior to surgery. The nerves often require time to heal after the pressure has been relieved. You may experience pain in your shoulders or between your shoulder blades, which is common after this surgery. The level of pain you experience should improve as your body heals. Driving   You may not drive or return to work until instructed by your physician. However, you may ride in the car for short periods of time. Neck collar   Wear your neck brace. You may remove it for short breaks, when eating or showering. You must keep the brace on while sleeping and when ambulating. Showering   You may shower in approximately 5 days after your surgery if your incision is not draining.  You may remove your brace during showers.  Do not rub or apply lotion or ointments to the incision site.  Do not use tub baths, swimming pools or Jacuzzis. Caring for your incision   Keep the clear, plastic dressing on until 3 days after surgery. At that point, if the incision is dry and without drainage, you may keep the wound open to air without cover.  You may have steri-strips on your incision (small, white pieces of tape). Do not pull the steri-strips; they will fall off on their own after several days. If you have sutures or staples, they will be removed by home health or when you see your physician.  Do not rub or apply any lotions or ointments to your incision site. Follow Up   Once you are home, call your physicians office to schedule an appointment 2-3 weeks after surgery. Notify your physician if you develop any of the following conditions:   Fever above 101 degrees for 24 hours.    Nausea or vomiting.  Severe headache.  Inability to urinate.  Loss of bowel or bladder control (sudden onset of incontinence).  Changes in sensation in your extremities (numbness, tingling, loss of color).  Severe pain or pain not relieved by medications.  Redness, swelling, or drainage from your incision.  Persistent pain in the chest.    Pain in the calf of either leg.  Increased weakness (if this is greater than before your surgery). If you have any questions, contact your Orthopaedic Surgeons office. OFFICE OF DR. Leslie Chamberlain   [phone]  [place] [id number] [address], [address] [facility] [address], [address] [facility] [address]     * WEAR YOUR BRACE AS ADVISED    * NO DRIVING UNTIL YOU ARE CLEARED TO DO SO BY YOUR SURGEON    * LIMIT LIFTING, BENDING AND TWISTING.  NO LIFTING MORE THAN 5 LBS    * MAKE SURE YOU ARE GETTING GOOD NUTRITION (Lean Protein, Vitamin D AND Calcium)    * DO NOT TAKE ANY NSAIDs FOR THE FIRST 3 MONTHS AFTER SURGERY (such as Advil/Ibuprofen/Motrin, Aleve/Naproxen/Naprosyn, Diclofenac, Celebrex, Meloxicam, Indomethacin, Goody's powder, BC powder etc.)    * NO NICOTINE PRODUCTS    * FULLY READ YOUR DISCHARGE INSTRUCTIONS

## 2019-07-30 NOTE — PROGRESS NOTES
7/30/2019  10:24 AM  Reason for Admission:   Elective - cervical radiculitis cervical spinal stenosis. RRAT Score:        2             Plan for utilizing home health:      No HH needs indicated. Current Advanced Directive/Advance Care Plan: On file. Transition of Care Plan:                      CM met with pt for assessment. Demographics and PCP were confirmed. Pt is a 54year old female who lives in a private residence with her partner (4 exteriors steps, 1 flight interior steps). PTA, pt was able to complete ADLs without the use of DME. Pt is employed, and insured through [employer] OBS letter provided and explained. No discharge service needs indicated. Pt's family will transport. CM will continue to monitor for finalized discharge recommendations. Konrad Shoemaker MA    Care Management Interventions  PCP Verified by CM: Yes(Sol. No NN to notify. )  Palliative Care Criteria Met (RRAT>21 & CHF Dx)?: No  Mode of Transport at Discharge:  Other (see comment)(Family)  MyChart Signup: No  Discharge Durable Medical Equipment: No  Physical Therapy Consult: No  Occupational Therapy Consult: No  Speech Therapy Consult: No  Current Support Network: Lives with Spouse  Confirm Follow Up Transport: Family  Plan discussed with Pt/Family/Caregiver: Yes  Discharge Location  Discharge Placement: Home with family assistance

## 2019-07-30 NOTE — PROGRESS NOTES
Problem: Falls - Risk of  Goal: *Absence of Falls  Description  Document [place] Fall Risk and appropriate interventions in the flowsheet.   Outcome: Progressing Towards Goal  Note:   Fall Risk Interventions:  Mobility Interventions: Bed/chair exit alarm         Medication Interventions: Patient to call before getting OOB    Elimination Interventions: Call light in reach              Problem: Patient Education: Go to Patient Education Activity  Goal: Patient/Family Education  Outcome: Progressing Towards Goal     Problem: Pain  Goal: *Control of Pain  Outcome: Progressing Towards Goal  Goal: *PALLIATIVE CARE:  Alleviation of Pain  Outcome: Progressing Towards Goal     Problem: Patient Education: Go to Patient Education Activity  Goal: Patient/Family Education  Outcome: Progressing Towards Goal     Problem: Simple Spine Procedure:  Post Op Day 1/Day of Discharge  Goal: Off Pathway (Use only if patient is Off Pathway)  Outcome: Progressing Towards Goal  Goal: Activity/Safety  Outcome: Progressing Towards Goal  Goal: Nutrition/Diet  Outcome: Progressing Towards Goal  Goal: Discharge Planning  Outcome: Progressing Towards Goal  Goal: Medications  Outcome: Progressing Towards Goal  Goal: Respiratory  Outcome: Progressing Towards Goal  Goal: Treatments/Interventions/Procedures  Outcome: Progressing Towards Goal  Goal: Psychosocial  Outcome: Progressing Towards Goal     Problem: Simple Spine Procedure: Discharge Outcomes  Goal: *Optimal pain control at patient's stated goal  Outcome: Progressing Towards Goal  Goal: *Demonstrates ability to place and remove stabilization device  Outcome: Progressing Towards Goal  Goal: *Progress independence mobility/activities (eg: Mobility precautions)  Outcome: Progressing Towards Goal  Goal: *Resumes normal function of bladder and bowel  Outcome: Progressing Towards Goal  Goal: *Lungs clear or at baseline  Outcome: Progressing Towards Goal  Goal: *Verbalizes name, dosage, time, side effects, and number of days to continue medications  Outcome: Progressing Towards Goal  Goal: *Modified independence with transfers, ambulation on levels with assistance devices, stair climbing, ADL's  Outcome: Progressing Towards Goal  Goal: *Describes follow-up/return visits to physicians  Outcome: Progressing Towards Goal  Goal: *Describes available resources and support systems  Outcome: Progressing Towards Goal  Goal: *Labs within defined limits  Outcome: Progressing Towards Goal  Goal: *Tolerating diet  Outcome: Progressing Towards Goal

## 2019-07-30 NOTE — PROGRESS NOTES
ORTHOPAEDIC CERVICAL FUSION PROGRESS NOTE    NAME:     Estefanía Robertson   :       1970   MRN:       [id number]   DATE:      2019    POD:              1 Day Post-Op  S/P:              Procedure(s):  C5-C7 ANTERIOR CERVICAL FUSION WITH INSTRUMENTATION    SUBJECTIVE:  C/O sore throat   No arm pain or numbness  Denies nausea/vomiting, headache, chest pain or shortness of breath  Pain controlled    Recent Labs     19  0407   HGB 13.7      K 3.8      CO2 28   BUN 8   CREA 0.74   *     Patient Vitals for the past 12 hrs:   BP Temp Pulse Resp SpO2   19 0414 128/78 98 °F (36.7 °C) 80 16 96 %   19 2223 126/78 98.1 °F (36.7 °C) 90 16 95 %       Exam:  VISTA collar inplace / intact  Dressings clean and dry  Positive strength/ROM bilat upper ext.   Neuro intact to sensation  BL UEs NVID    PLAN:  Continue PO pain medications as needed  Chloraseptic spray at bedside  Advance diet as tolerated  Out of bed w/ assist  Likely D/C to home today      Dar Muhammad [place]  Orthopaedic Surgery  Physician Assistant to Dr. Cruz Bryant

## 2019-07-30 NOTE — PROGRESS NOTES
Bedside and Verbal shift change report given to Jie Jensen (oncoming nurse) by Kenyetta Tubbs (offgoing nurse). Report included the following information SBAR, Kardex and Recent Results.

## 2019-07-30 NOTE — PROGRESS NOTES
Spiritual Care Partner Volunteer visited patient on the Post Surgical Ortho unit on 7/30/19. Documented by:  Joyce Pal.      Paging Service: [phone]

## 2019-08-05 NOTE — DISCHARGE SUMMARY
DISCHARGE SUMMARY     Patient: Mustapha Record Number: [id number]                : 1970  Age: 54 y.o. Admit Date: 2019  Discharge Date: 2019  Admission Diagnosis: Cervical stenosis of spinal canal [M48.02]  Discharge Diagnosis: CERVICAL RADICULITIS   CERVICAL SPINAL STENOSIS  Procedures: Procedure(s):  C5-C7 ANTERIOR CERVICAL FUSION WITH INSTRUMENTATION  Surgeon: Mitzi Fu MD  Anesthesia: General  Complications: None     History of Present Illness:  Vero Stock is a 54 y.o. female with a history of intractable neck pain and radiculopathy. Despite conservative management and after clinical and radiographic evaluation, it was determined that she suffered from cervical stenosis and would benefit from Procedure(s):  C5-C7 ANTERIOR CERVICAL FUSION WITH INSTRUMENTATION, which she consented to undergo after a discussion of the risks, benefits, alternatives, rehab concerns, and potential complications of surgery. Hospital Course:  Mercy Saavedra tolerated the procedure well. She was transferred  to the recovery room in stable condition. After a brief stay, the patient was then transferred to the Orthopedic floor. On postoperative day #1, the dressing was clean and dry and she was neurovascularly intact. The patient was afebrile and vital signs were stable. Vero Stock was deemed able to be discharged to Home  in stable condition on postoperative day 1. She was provided with routine postoperative instructions and advised to follow up in my office in 3 weeks following discharge from the hospital.  She was given a brace and prescriptions for medication to control post-operative symptoms. Discharge Medications:  Discharge Medication List as of 2019 11:00 AM      START taking these medications    Details   oxyCODONE-acetaminophen (PERCOCET) 5-325 mg per tablet Take 1-2 Tabs by mouth every six (6) hours as needed for Pain for up to 7 days.  Max Daily Amount: 8 Tabs. Indications: Pain, Print, Disp-50 Tab, R-0      docusate sodium (COLACE) 100 mg capsule Take 1 Cap by mouth two (2) times a day., Print, Disp-60 Cap, R-0         CONTINUE these medications which have NOT CHANGED    Details   cholecalciferol, VITAMIN D3, (VITAMIN D3) 5,000 unit tab tablet Take 2 Tabs by mouth daily for 30 days. Indications: vitamin D deficiency, Normal, Disp-60 Tab, R-0      SUMAtriptan (IMITREX) 100 mg tablet Take 100 mg by mouth as needed for Migraine., Historical Med      tiZANidine (ZANAFLEX) 4 mg tablet Take 4 mg by mouth nightly., Historical Med      gabapentin (NEURONTIN) 300 mg capsule Take 600 mg by mouth nightly., Historical Med      fremanezumab-vfrm (AJOVY) 225 mg/1.5 mL syrg by SubCUTAneous route every thirty (30) days. Indications: Migraine Prevention, Historical Med      lidocaine (LIDODERM) 5 % 1 Patch by TransDERmal route as needed. Apply patch to the affected area for 12 hours a day and remove for 12 hours a day., Historical Med      esomeprazole magnesium (NEXIUM PO) Take 1 Cap by mouth as needed., Historical Med      traZODone (DESYREL) 100 mg tablet Take 100 mg by mouth nightly., Historical Med      venlafaxine-SR (EFFEXOR XR) 150 mg capsule Take 150 mg by mouth daily. , Historical Med         STOP taking these medications       NAPROXEN PO Comments:   Reason for Stopping:         oxyCODONE-acetaminophen (PERCOCET) 7.5-325 mg per tablet Comments:   Reason for Stopping:               KAIT Fraga  7/30/2019  Orthopaedic Surgery  Physician Assistant to Dr. Parish Gentile

## 2019-08-12 ENCOUNTER — OFFICE VISIT (OUTPATIENT)
Dept: PAIN MANAGEMENT | Age: 49
End: 2019-08-12

## 2019-08-12 VITALS
RESPIRATION RATE: 16 BRPM | DIASTOLIC BLOOD PRESSURE: 90 MMHG | OXYGEN SATURATION: 97 % | WEIGHT: 225 LBS | HEART RATE: 87 BPM | SYSTOLIC BLOOD PRESSURE: 143 MMHG | HEIGHT: 69 IN | TEMPERATURE: 97 F | BODY MASS INDEX: 33.33 KG/M2

## 2019-08-12 DIAGNOSIS — Z98.1 S/P CERVICAL SPINAL FUSION: ICD-10-CM

## 2019-08-12 DIAGNOSIS — M54.12 CERVICAL RADICULOPATHY: ICD-10-CM

## 2019-08-12 DIAGNOSIS — M51.36 DDD (DEGENERATIVE DISC DISEASE), LUMBAR: ICD-10-CM

## 2019-08-12 DIAGNOSIS — M54.50 CHRONIC MIDLINE LOW BACK PAIN WITHOUT SCIATICA: Primary | ICD-10-CM

## 2019-08-12 DIAGNOSIS — G89.4 CHRONIC PAIN SYNDROME: ICD-10-CM

## 2019-08-12 DIAGNOSIS — G89.29 CHRONIC MIDLINE LOW BACK PAIN WITHOUT SCIATICA: Primary | ICD-10-CM

## 2019-08-12 DIAGNOSIS — M54.2 CERVICALGIA: ICD-10-CM

## 2019-08-12 DIAGNOSIS — M50.30 DDD (DEGENERATIVE DISC DISEASE), CERVICAL: ICD-10-CM

## 2019-08-12 RX ORDER — TIZANIDINE 4 MG/1
4-8 TABLET ORAL
Qty: 60 TAB | Refills: 1 | Status: SHIPPED | OUTPATIENT
Start: 2019-08-12 | End: 2019-09-09 | Stop reason: SDUPTHER

## 2019-08-12 RX ORDER — OXYCODONE AND ACETAMINOPHEN 7.5; 325 MG/1; MG/1
1 TABLET ORAL
COMMUNITY
End: 2019-08-12 | Stop reason: SDUPTHER

## 2019-08-12 RX ORDER — OXYCODONE AND ACETAMINOPHEN 7.5; 325 MG/1; MG/1
1 TABLET ORAL
Qty: 90 TAB | Refills: 0 | Status: SHIPPED | OUTPATIENT
Start: 2019-08-24 | End: 2019-09-19 | Stop reason: SDUPTHER

## 2019-08-12 RX ORDER — GABAPENTIN 300 MG/1
600 CAPSULE ORAL
Qty: 60 CAP | Refills: 2 | Status: SHIPPED | OUTPATIENT
Start: 2019-08-12 | End: 2019-11-08 | Stop reason: SDUPTHER

## 2019-08-12 NOTE — PATIENT INSTRUCTIONS

## 2019-08-12 NOTE — PROGRESS NOTES
Nursing Notes    Patient presents to the office today in follow-up. Patient rates her pain at 4/10 on the numerical pain scale. Reviewed medications with counts as follows:    Rx Date filled Qty Dispensed Pill Count Last Dose Short   Percocet 7.5/325 mg  07/25/19 90 29 today                                        reviewed YES  Any aberrancies noted on  YES- undisclosed percocet after recent surgery. Pt was counseled verbally and she verbalized understanding. Last opioid agreement 05/24/19  Last urine drug screen 05/24/19    Comments:     POC UDS was not performed in office today    Any new labs or imaging since last appointment? YES. Pt had some labs and imaging related to her recent neck surgery    Have you been to an emergency room (ER) or urgent care clinic since your last visit? NO            Have you been hospitalized since your last visit? YES     If yes, where, when, and reason for visit? Pt was at [facility] for one night after her ACDF. Have you seen or consulted any other health care providers outside of the [address] [place] [address]  since your last visit? YES     If yes, where, when, and reason for visit? orthopedic  Ms. Saavedra has a reminder for a \"due or due soon\" health maintenance. I have asked that she contact her primary care provider for follow-up on this health maintenance.     3 most recent PHQ Screens 8/12/2019   PHQ Not Done -   Little interest or pleasure in doing things Not at all   Feeling down, depressed, irritable, or hopeless Not at all   Total Score PHQ 2 0   Trouble falling or staying asleep, or sleeping too much Not at all   Feeling tired or having little energy Not at all   Poor appetite, weight loss, or overeating Not at all   Feeling bad about yourself - or that you are a failure or have let yourself or your family down Not at all   Trouble concentrating on things such as school, work, reading, or watching TV Not at all   Moving or speaking so slowly that other people could have noticed; or the opposite being so fidgety that others notice Not at all   Thoughts of being better off dead, or hurting yourself in some way Not at all   PHQ 9 Score 0   How difficult have these problems made it for you to do your work, take care of your home and get along with others Not difficult at all

## 2019-08-13 NOTE — PROGRESS NOTES
Referral date 9/12/16, source ? and for polyarticular pain including cervical and lumbar spine with radiation. Calculated MEQ - 33.75  Naloxone rescue - yes  Prophylactic bowel program - no  Date of last OCA today  Last UDS 3/21/19, consistent   date checked today, findings consistent    Today   Last Visit Prior Visit(s)  [facility] - 5 & 4  5 & 3  6 & 2  5 & 2  [facility] - 18%  22%  22%  14%  [facility] - 5  1  4  3      HPI:  Eduin Velasquez is a 54 y.o. female here for f/u visit for ongoing evaluation of chronic neck and lower back pain. Pt was last seen here on 5/24/19. Pt denies interval changes on the character or distribution of pain. Pt is 2 weeks s/p ACFD C5-C7. She reports she no longer experiences UE numbness/tingling and her pain has improved. She states she really is only having muscle spasms along her right shoulder blade region described as aching. Pain at its best is 3/10. Pain at its worse is 7/10. The pain is worsened by prolonged standing and standing on concrete. Symptoms are improved by rest, Naproxen, Zanaflex, Lidocaine patches, OTC capsaicin patches and heat. SIJ, cervical steroid injections and lower back trigger point injections in the past with relief. Never had PT for neck or back. Knee PT ~1 year ago with good improvement. Pt has tried TENS unit with no perceived benefit. She has not tried aquatic PT, acupuncture therapy, yoga therapy, chiuropractor, massage therapy, implantable pain pump, SCS trial, RFA, Lyrica or TCA. Allergy to Wellbutrin and is therefore unable to take Cymbalta stating \"that would do the same thing\". Stopped taking Topamax for migraines due to it affecting her memory.       Social History     Socioeconomic History    Marital status:      Spouse name: Not on file    Number of children: Not on file    Years of education: Not on file    Highest education level: Not on file   Occupational History    Not on file   Social Needs    Financial resource strain: Not on file   [employer] Food insecurity:     Worry: Not on file     Inability: Not on file    Transportation needs:     Medical: Not on file     Non-medical: Not on file   Tobacco Use    Smoking status: Former Smoker    Smokeless tobacco: Never Used   Substance and Sexual Activity    Alcohol use: No    Drug use: No    Sexual activity: Not on file   Lifestyle    Physical activity:     Days per week: Not on file     Minutes per session: Not on file    Stress: Not on file   Relationships    Social connections:     Talks on phone: Not on file     Gets together: Not on file     Attends [employer] service: Not on file     Active member of club or organization: Not on file     Attends meetings of clubs or organizations: Not on file     Relationship status: Not on file    Intimate partner violence:     Fear of current or ex partner: Not on file     Emotionally abused: Not on file     Physically abused: Not on file     Forced sexual activity: Not on file   Other Topics Concern    Not on file   Social History Narrative    Not on file     Family History   Problem Relation Age of Onset    Hypertension Mother     Alcohol abuse Father     Drug Abuse Father     No Known Problems Sister     Asthma Brother     Hypertension Maternal Grandmother     No Known Problems Sister      Allergies   Allergen Reactions    Wellbutrin [Bupropion Hcl] Other (comments)     manic     Past Medical History:   Diagnosis Date    Depression     GERD (gastroesophageal reflux disease)     Hypoglycemia     Insomnia     Migraines     Nicotine vapor product user     6%    Obesity, Class I, BMI 30-34.9     Tinnitus      Past Surgical History:   Procedure Laterality Date    HX ADENOIDECTOMY N/A 1978    HX ANKLE FRACTURE TX Left 1998    HX CHOLECYSTECTOMY  2010    HX COLONOSCOPY      HX ENDOSCOPY      HX SHOULDER ARTHROSCOPY Right [id number]     Current Outpatient Medications on File Prior to Visit   Medication Sig    docusate sodium (COLACE) 100 mg capsule Take 1 Cap by mouth two (2) times a day.  cholecalciferol, VITAMIN D3, (VITAMIN D3) 5,000 unit tab tablet Take 2 Tabs by mouth daily for 30 days. Indications: vitamin D deficiency    SUMAtriptan (IMITREX) 100 mg tablet Take 100 mg by mouth as needed for Migraine.  fremanezumab-vfrm (AJOVY) 225 mg/1.5 mL syrg by SubCUTAneous route every thirty (30) days. Indications: Migraine Prevention    lidocaine (LIDODERM) 5 % 1 Patch by TransDERmal route as needed. Apply patch to the affected area for 12 hours a day and remove for 12 hours a day.  esomeprazole magnesium (NEXIUM PO) Take 1 Cap by mouth as needed.  traZODone (DESYREL) 100 mg tablet Take 100 mg by mouth nightly.  venlafaxine-SR (EFFEXOR XR) 150 mg capsule Take 150 mg by mouth daily. No current facility-administered medications on file prior to visit. ROS:  Reports trouble swallowing. Denies fever, chills, nausea, vomiting, diarrhea, constipation, abdominal pain, chest pain, shortness or breath/trouble breathing, weakness, changes in vision, changes in hearing, falls, dizziness, bladder incontinence, bowel incontinence, depression, anxiety, suicidal ideations, homicidal ideations or alcohol use. Opioid specific risk: GERD, obesity, insomnia. Vitals:    08/12/19 1104   BP: 143/90   Pulse: 87   Resp: 16   Temp: 97 °F (36.1 °C)   TempSrc: Oral   SpO2: 97%   Weight: 102.1 kg (225 lb)   Height: 5' 9\" (1.753 m)   PainSc:   4   PainLoc: Neck   LMP: 08/08/2019          Physical exam:  AFVSS, no acute distress, obese body habitus. A&OXs 3. Pt wearing cervical collar. Normocephalic, atraumatic. Conjugate gaze, clear sclerae. Speech is clear and appropriate. Mood is appropriate and patient is cooperative. Gait and balance are within functional limits. Non-labored breathing. Anterior neck with surgical scar.       Primary Care Physician  Jabari Horton [facility], [address] [id number]  [phone]      PHQ -- .  3 most recent PHQ Screens 8/12/2019   PHQ Not Done -   Little interest or pleasure in doing things Not at all   Feeling down, depressed, irritable, or hopeless Not at all   Total Score PHQ 2 0   Trouble falling or staying asleep, or sleeping too much Not at all   Feeling tired or having little energy Not at all   Poor appetite, weight loss, or overeating Not at all   Feeling bad about yourself - or that you are a failure or have let yourself or your family down Not at all   Trouble concentrating on things such as school, work, reading, or watching TV Not at all   Moving or speaking so slowly that other people could have noticed; or the opposite being so fidgety that others notice Not at all   Thoughts of being better off dead, or hurting yourself in some way Not at all   PHQ 9 Score 0   How difficult have these problems made it for you to do your work, take care of your home and get along with others Not difficult at all         Assessment/Plan:     ICD-10-CM ICD-9-CM    1. Chronic midline low back pain without sciatica M54.5 724.2 tiZANidine (ZANAFLEX) 4 mg tablet    G89.29 338.29 gabapentin (NEURONTIN) 300 mg capsule      oxyCODONE-acetaminophen (PERCOCET 7.5) 7.5-325 mg per tablet   2. Chronic pain syndrome G89.4 338.4 tiZANidine (ZANAFLEX) 4 mg tablet      gabapentin (NEURONTIN) 300 mg capsule      oxyCODONE-acetaminophen (PERCOCET 7.5) 7.5-325 mg per tablet   3. Cervical radiculopathy M54.12 723.4 tiZANidine (ZANAFLEX) 4 mg tablet      gabapentin (NEURONTIN) 300 mg capsule      oxyCODONE-acetaminophen (PERCOCET 7.5) 7.5-325 mg per tablet   4. DDD (degenerative disc disease), cervical M50.30 722.4 tiZANidine (ZANAFLEX) 4 mg tablet      gabapentin (NEURONTIN) 300 mg capsule      oxyCODONE-acetaminophen (PERCOCET 7.5) 7.5-325 mg per tablet   5.  DDD (degenerative disc disease), lumbar M51.36 722.52 tiZANidine (ZANAFLEX) 4 mg tablet      gabapentin (NEURONTIN) 300 mg capsule oxyCODONE-acetaminophen (PERCOCET 7.5) 7.5-325 mg per tablet   6. Cervicalgia M54.2 723.1 tiZANidine (ZANAFLEX) 4 mg tablet      gabapentin (NEURONTIN) 300 mg capsule      oxyCODONE-acetaminophen (PERCOCET 7.5) 7.5-325 mg per tablet   7. S/P cervical spinal fusion Z98.1 V45.4         1) Medications (opiate and non-opiate)  -- I do not recommend long term opioid therapy for this patient at this time; risks outweigh potential benefits. Pt currently taking Percocet 7.5/325mg up to 3 times a day as needed with a total of 90 tabs to be budgeted over 30 days. She has been successfully weaned off Morphine ER with last fill date of 12/30/18. Their MME is 33.75.  --Today, we will pause the weaning of patients opioid medication with a goal of being opioid free, pending safety and compliance at next office visit. Pt instructed to call if they experience any signs of withdrawal (diarrhea, nausea, vomiting, sweating or chills, agitation, itching). --At next office visit, the plan is to provide patient with Percocet 7.5/325mg up to 3 times a day as needed with a total of 85 tabs to be budgeted over 30 days. If patient has difficulty with the wean or difficulty with cravings we will consider referral to mental health for ongoing assessment and treatment for opioid use disorder. --Continue Gabapentin 600mg QHS. --Continue Zanaflex as needed; refill provided today. --Continue Lidocaine patches as needed. --Continue OTC capsaicin patches as needed. --Continue NSAID as needed with food. --Continue Tylenol optimization; pt may take a total of 300mg per acetaminophen from all sources per 24 hour period. 2) Restorative Therapies  ----Consider aquatic PT, acupuncture therapy, yoga therapy and/or massage therapy in the future. 3) Interventional Procedures  --Consider interventional pain procedures with Dr Elier Joya in the future (SIJ and/or cervical steroid injections or trigger point injections).     4) [address] Approaches  --Pt would benefit from referral to pain psychology for training and cognitive behavorial therapy, acceptance commitment therapy, biofeedback and mindfulness mediation and other modalities as indicated for treatment of chronic pain once this service is available at our clinic or by referral to Dr Hailee Brito. 5) Complementary & Integrative Health  --Pt post op pain to be managed by her surgeon. Pt states understanding. She has follow up with him on 8/22/19. --Follow up ongoing assessment and ongoing development of integrative and comprehensive plan of care for chronic pain. Goals: To establish complementary and integrative plan of care to address chronic pain issues while minimizing pharmaceuticals to maximize patient's function improve quality of life. Education:  Patient again educated on the importance of strict compliance with the opioid care agreement while on opioid therapy. Patient also again educated that they should avoid driving while on chronic opioid therapy. Also advised to avoid alcohol and to avoid benzodiazepines while on opioid therapy. Handouts given regarding opioid safety. Follow-up and Dispositions    · Return in about 3 months (around 11/12/2019) for 30 min. [address] was used for portions of this report. Unintended errors may occur.

## 2019-09-07 DIAGNOSIS — M50.30 DDD (DEGENERATIVE DISC DISEASE), CERVICAL: ICD-10-CM

## 2019-09-07 DIAGNOSIS — M51.36 DDD (DEGENERATIVE DISC DISEASE), LUMBAR: ICD-10-CM

## 2019-09-07 DIAGNOSIS — G89.29 CHRONIC MIDLINE LOW BACK PAIN WITHOUT SCIATICA: ICD-10-CM

## 2019-09-07 DIAGNOSIS — G89.4 CHRONIC PAIN SYNDROME: ICD-10-CM

## 2019-09-07 DIAGNOSIS — M54.50 CHRONIC MIDLINE LOW BACK PAIN WITHOUT SCIATICA: ICD-10-CM

## 2019-09-07 DIAGNOSIS — M54.12 CERVICAL RADICULOPATHY: ICD-10-CM

## 2019-09-07 DIAGNOSIS — M54.2 CERVICALGIA: ICD-10-CM

## 2019-09-09 RX ORDER — TIZANIDINE 4 MG/1
TABLET ORAL
Qty: 30 TAB | Refills: 0 | OUTPATIENT
Start: 2019-09-09

## 2019-09-09 NOTE — TELEPHONE ENCOUNTER
Can you please call patient and see if she needs a refill of Zanaflex as this was electronically sent over from [facility]. Please let them know they are required to call our office for all refill requests.

## 2019-09-10 RX ORDER — TIZANIDINE 4 MG/1
4-8 TABLET ORAL
Qty: 60 TAB | Refills: 1 | Status: SHIPPED | OUTPATIENT
Start: 2019-09-10 | End: 2019-11-08 | Stop reason: SDUPTHER

## 2019-09-19 DIAGNOSIS — M54.12 CERVICAL RADICULOPATHY: ICD-10-CM

## 2019-09-19 DIAGNOSIS — G89.29 CHRONIC MIDLINE LOW BACK PAIN WITHOUT SCIATICA: ICD-10-CM

## 2019-09-19 DIAGNOSIS — M54.50 CHRONIC MIDLINE LOW BACK PAIN WITHOUT SCIATICA: ICD-10-CM

## 2019-09-19 DIAGNOSIS — M51.36 DDD (DEGENERATIVE DISC DISEASE), LUMBAR: ICD-10-CM

## 2019-09-19 DIAGNOSIS — M54.2 CERVICALGIA: ICD-10-CM

## 2019-09-19 DIAGNOSIS — G89.4 CHRONIC PAIN SYNDROME: ICD-10-CM

## 2019-09-19 DIAGNOSIS — M50.30 DDD (DEGENERATIVE DISC DISEASE), CERVICAL: ICD-10-CM

## 2019-09-19 RX ORDER — OXYCODONE AND ACETAMINOPHEN 7.5; 325 MG/1; MG/1
1 TABLET ORAL
Qty: 90 TAB | Refills: 0 | Status: SHIPPED | OUTPATIENT
Start: 2019-09-22 | End: 2019-10-15 | Stop reason: SDUPTHER

## 2019-09-19 NOTE — TELEPHONE ENCOUNTER
Salima Arben has called requesting a refill of their controlled medication, percocet, for the management of her chronic neck and back pain. Last office visit date: 08/12/19  Last opioid care agreement 05/31/19  Last UDS was done 03/21/19    Date last  was pulled and reviewed : 09/19/19  Last fill date for medication was 08/23/19 for percocet 7.5/325 mg #90 tabs    Was the patient compliant when the above report was pulled? yes    Analgesia: pt reports an 85% pain relief with her current opioid medication regimen     Aberrancies: none noted     ADL's: pt reports that she is able to perform her normal daily tasks because she is taking her medication. Adverse Reaction: none reported by the pt at this time. Provider's last note and plan of care reviewed? yes  Request forwarded to provider for review.

## 2019-09-19 NOTE — TELEPHONE ENCOUNTER
Patient identified using two patient identifiers (name and ); patient advised prescriptions are ready for pick-up. Patient also informed  hours are Mon-Fri [id number]. Patient verbalized understanding.

## 2019-09-20 ENCOUNTER — OFFICE VISIT (OUTPATIENT)
Dept: PAIN MANAGEMENT | Age: 49
End: 2019-09-20

## 2019-09-20 DIAGNOSIS — Z79.899 ENCOUNTER FOR LONG-TERM (CURRENT) USE OF HIGH-RISK MEDICATION: Primary | ICD-10-CM

## 2019-10-15 DIAGNOSIS — G89.29 CHRONIC MIDLINE LOW BACK PAIN WITHOUT SCIATICA: ICD-10-CM

## 2019-10-15 DIAGNOSIS — M51.36 DDD (DEGENERATIVE DISC DISEASE), LUMBAR: ICD-10-CM

## 2019-10-15 DIAGNOSIS — M54.50 CHRONIC MIDLINE LOW BACK PAIN WITHOUT SCIATICA: ICD-10-CM

## 2019-10-15 DIAGNOSIS — M54.2 CERVICALGIA: ICD-10-CM

## 2019-10-15 DIAGNOSIS — G89.4 CHRONIC PAIN SYNDROME: ICD-10-CM

## 2019-10-15 DIAGNOSIS — M54.12 CERVICAL RADICULOPATHY: ICD-10-CM

## 2019-10-15 DIAGNOSIS — M50.30 DDD (DEGENERATIVE DISC DISEASE), CERVICAL: ICD-10-CM

## 2019-10-15 RX ORDER — OXYCODONE AND ACETAMINOPHEN 7.5; 325 MG/1; MG/1
1 TABLET ORAL
Qty: 90 TAB | Refills: 0 | Status: SHIPPED | OUTPATIENT
Start: 2019-10-21 | End: 2019-11-08 | Stop reason: CLARIF

## 2019-10-15 NOTE — TELEPHONE ENCOUNTER
Yun Oleary has called requesting a refill of their controlled medication, percocet, for the management of her chronic neck and back pain. Last office visit date: 08/12/19  Last opioid care agreement 05/24/19  Last UDS was done 09/20/19    Date last  was pulled and reviewed : 10/15/19  Last fill date for medication was 09/21/19 for percocet 7.5/325 mg #90 tabs     Was the patient compliant when the above report was pulled? yes    Analgesia: pt reports an 85% pain relief with her current opioid medication regimen     Aberrancies: none noted     ADL's: pt reports that she is able to perform her normal daily tasks because she is taking her medication. Adverse Reaction: none reported by the pt at this time. Provider's last note and plan of care reviewed? yes  Request forwarded to provider for review.

## 2019-10-16 NOTE — TELEPHONE ENCOUNTER
Spoke with patient after getting 2 points of identity, informing patient she has a script ready to pickup at the office, please come by 3:45pm Monday thru Friday and bring a valid picture ID- patient will come end of week.

## 2019-11-08 ENCOUNTER — OFFICE VISIT (OUTPATIENT)
Dept: PAIN MANAGEMENT | Age: 49
End: 2019-11-08

## 2019-11-08 VITALS
RESPIRATION RATE: 16 BRPM | HEIGHT: 69 IN | DIASTOLIC BLOOD PRESSURE: 76 MMHG | BODY MASS INDEX: 33.33 KG/M2 | OXYGEN SATURATION: 97 % | HEART RATE: 79 BPM | TEMPERATURE: 96.8 F | WEIGHT: 225 LBS | SYSTOLIC BLOOD PRESSURE: 114 MMHG

## 2019-11-08 DIAGNOSIS — M51.36 DDD (DEGENERATIVE DISC DISEASE), LUMBAR: ICD-10-CM

## 2019-11-08 DIAGNOSIS — G89.29 CHRONIC MIDLINE LOW BACK PAIN WITHOUT SCIATICA: ICD-10-CM

## 2019-11-08 DIAGNOSIS — M54.12 CERVICAL RADICULOPATHY: ICD-10-CM

## 2019-11-08 DIAGNOSIS — M54.50 CHRONIC MIDLINE LOW BACK PAIN WITHOUT SCIATICA: ICD-10-CM

## 2019-11-08 DIAGNOSIS — M50.30 DDD (DEGENERATIVE DISC DISEASE), CERVICAL: ICD-10-CM

## 2019-11-08 DIAGNOSIS — M54.2 CERVICALGIA: Primary | ICD-10-CM

## 2019-11-08 DIAGNOSIS — G89.4 CHRONIC PAIN SYNDROME: ICD-10-CM

## 2019-11-08 DIAGNOSIS — Z98.1 S/P CERVICAL SPINAL FUSION: ICD-10-CM

## 2019-11-08 DIAGNOSIS — Z79.899 ENCOUNTER FOR LONG-TERM (CURRENT) USE OF HIGH-RISK MEDICATION: ICD-10-CM

## 2019-11-08 RX ORDER — GABAPENTIN 300 MG/1
600 CAPSULE ORAL
Qty: 60 CAP | Refills: 2 | Status: SHIPPED | OUTPATIENT
Start: 2019-11-08 | End: 2019-12-08

## 2019-11-08 RX ORDER — OXYCODONE AND ACETAMINOPHEN 5; 325 MG/1; MG/1
1 TABLET ORAL
Qty: 90 TAB | Refills: 0 | Status: SHIPPED | OUTPATIENT
Start: 2019-11-20 | End: 2019-12-20

## 2019-11-08 RX ORDER — TIZANIDINE 4 MG/1
4-8 TABLET ORAL
Qty: 60 TAB | Refills: 2 | Status: SHIPPED | OUTPATIENT
Start: 2019-11-08 | End: 2019-12-08

## 2019-11-08 NOTE — PROGRESS NOTES
Referral date 9/12/16, source ? and for polyarticular pain including cervical and lumbar spine with radiation. Calculated MEQ - 33.75  Naloxone rescue - yes  Prophylactic bowel program - yes  Date of last OCA 5/24/19  Last  UDS 9/20/19, not consistent (+) 545 ng/mL hydrocodone, 650 ng/mL norhydrocodone and 389 ng/mL hydromorphone   Prior UDS 3/21/19, consistent   date checked today, findings consistent    Today   Last Visit Prior Visit(s)  [facility] - 5 & 4  5 & 3  6 & 2  5 & 2  [facility] - 18%  22%  22%  14%  [facility] - 5  1  4  3      HPI:  Oswaldo Gardner is a 54 y.o. female here for f/u visit for ongoing evaluation of chronic neck and lower back pain. S/P ACDF C5-C7. Pt was last seen here on 5/24/19. Pt denies interval changes on the character or distribution of pain. She states she really is only having muscle spasms along her right shoulder blade region described as aching. Pain at its best is 2/10. Pain at its worse is 8/10. Pain on average is 4/10. The pain is worsened by prolonged standing and standing on concrete. Symptoms are improved by rest, Naproxen, Zanaflex, Lidocaine patches, OTC capsaicin patches and heat. SIJ, cervical steroid injections and lower back trigger point injections in the past with relief. Never had PT for neck or back. Knee PT ~1 year ago with good improvement. Pt has tried TENS unit with no perceived benefit. She has not tried aquatic PT, acupuncture therapy, yoga therapy, chiuropractor, massage therapy, implantable pain pump, SCS trial, RFA, Lyrica or TCA. Allergy to Wellbutrin and is therefore unable to take Cymbalta stating \"that would do the same thing\". Stopped taking Topamax for migraines due to it affecting her memory. Since last visit, she reports pain in between her shoulder blades described as aching. She has had this since her cervical surgery but has not discussed it with her PCP or surgeon.  Discussed with patient that any new pain or chronic pain that has changed character or distribution needs full workup by PCP, orthopedics, urgent care or ED; pt states understanding. Capsaicin patches burned too much so she stopped using them.       Social History     Socioeconomic History    Marital status:      Spouse name: Not on file    Number of children: Not on file    Years of education: Not on file    Highest education level: Not on file   Occupational History    Not on file   Social Needs    Financial resource strain: Not on file    Food insecurity:     Worry: Not on file     Inability: Not on file    Transportation needs:     Medical: Not on file     Non-medical: Not on file   Tobacco Use    Smoking status: Former Smoker    Smokeless tobacco: Never Used   Substance and Sexual Activity    Alcohol use: No    Drug use: No    Sexual activity: Not on file   Lifestyle    Physical activity:     Days per week: Not on file     Minutes per session: Not on file    Stress: Not on file   Relationships    Social connections:     Talks on phone: Not on file     Gets together: Not on file     Attends Judaism service: Not on file     Active member of club or organization: Not on file     Attends meetings of clubs or organizations: Not on file     Relationship status: Not on file    Intimate partner violence:     Fear of current or ex partner: Not on file     Emotionally abused: Not on file     Physically abused: Not on file     Forced sexual activity: Not on file   Other Topics Concern    Not on file   Social History Narrative    Not on file     Family History   Problem Relation Age of Onset    Hypertension Mother     Alcohol abuse Father     Drug Abuse Father     No Known Problems Sister     Asthma Brother     Hypertension Maternal Grandmother     No Known Problems Sister      Allergies   Allergen Reactions    Wellbutrin [Bupropion Hcl] Other (comments)     manic     Past Medical History:   Diagnosis Date    Depression     GERD (gastroesophageal reflux disease)     Hypoglycemia     Insomnia     Migraines     Nicotine vapor product user     6%    Obesity, Class I, BMI 30-34.9     Tinnitus      Past Surgical History:   Procedure Laterality Date    HX ADENOIDECTOMY N/A 1978    HX ANKLE FRACTURE TX Left 1998    HX CHOLECYSTECTOMY  2010    HX COLONOSCOPY      HX ENDOSCOPY      HX SHOULDER ARTHROSCOPY Right [id number]     Current Outpatient Medications on File Prior to Visit   Medication Sig    docusate sodium (COLACE) 100 mg capsule Take 1 Cap by mouth two (2) times a day.  SUMAtriptan (IMITREX) 100 mg tablet Take 100 mg by mouth as needed for Migraine.  fremanezumab-vfrm (AJOVY) 225 mg/1.5 mL syrg by SubCUTAneous route every thirty (30) days. Indications: Migraine Prevention    lidocaine (LIDODERM) 5 % 1 Patch by TransDERmal route as needed. Apply patch to the affected area for 12 hours a day and remove for 12 hours a day.  esomeprazole magnesium (NEXIUM PO) Take 1 Cap by mouth as needed.  traZODone (DESYREL) 100 mg tablet Take 100 mg by mouth nightly.  venlafaxine-SR (EFFEXOR XR) 150 mg capsule Take 150 mg by mouth daily.  [DISCONTINUED] oxyCODONE-acetaminophen (PERCOCET 7.5) 7.5-325 mg per tablet Take 1 Tab by mouth every eight (8) hours as needed for Pain for up to 30 days. Max Daily Amount: 3 Tabs.  [DISCONTINUED] tiZANidine (ZANAFLEX) 4 mg tablet Take 1-2 Tabs by mouth nightly for 30 days.  [DISCONTINUED] gabapentin (NEURONTIN) 300 mg capsule Take 2 Caps by mouth nightly for 30 days. Max Daily Amount: 600 mg. No current facility-administered medications on file prior to visit. ROS:  Denies fever, chills, nausea, vomiting, diarrhea, constipation, abdominal pain, chest pain, shortness or breath/trouble breathing, trouble swallowing,  weakness, changes in vision, changes in hearing, falls, dizziness, bladder incontinence, bowel incontinence, depression, anxiety, suicidal ideations, homicidal ideations or alcohol use.     Opioid specific risk: GERD, obesity, insomnia, being short on prescribed medication at pill count and self increasing. Vitals:    11/08/19 1113   BP: 114/76   Pulse: 79   Resp: 16   Temp: 96.8 °F (36 °C)   SpO2: 97%   Weight: 102.1 kg (225 lb)   Height: 5' 9\" (1.753 m)   PainSc:   2   PainLoc: Back          Physical exam:  AFVSS, no acute distress, obese body habitus. A&OXs 3. Normocephalic, atraumatic. Conjugate gaze, clear sclerae. Speech is clear and appropriate. Mood is appropriate and patient is cooperative. Gait and balance are within functional limits. Non-labored breathing. Primary Care Physician  Gi Ferrara  [address] [facility] [address]  [phone]      [place] [address] -- .  3 most recent PHQ Screens 11/8/2019   PHQ Not Done -   Little interest or pleasure in doing things Not at all   Feeling down, depressed, irritable, or hopeless Not at all   Total Score PHQ 2 0   Trouble falling or staying asleep, or sleeping too much -   Feeling tired or having little energy -   Poor appetite, weight loss, or overeating -   Feeling bad about yourself - or that you are a failure or have let yourself or your family down -   Trouble concentrating on things such as school, work, reading, or watching TV -   Moving or speaking so slowly that other people could have noticed; or the opposite being so fidgety that others notice -   Thoughts of being better off dead, or hurting yourself in some way -   PHQ 9 Score -   How difficult have these problems made it for you to do your work, take care of your home and get along with others -         Assessment/Plan:     ICD-10-CM ICD-9-CM    1. Cervicalgia M54.2 723.1 REFERRAL TO PAIN MANAGEMENT      oxyCODONE-acetaminophen (PERCOCET) 5-325 mg per tablet      REFERRAL TO PSYCHOLOGY      gabapentin (NEURONTIN) 300 mg capsule      tiZANidine (ZANAFLEX) 4 mg tablet   2.  Cervical radiculopathy M54.12 723.4 REFERRAL TO PAIN MANAGEMENT      oxyCODONE-acetaminophen (PERCOCET) 5-325 mg per tablet      REFERRAL TO PSYCHOLOGY      gabapentin (NEURONTIN) 300 mg capsule      tiZANidine (ZANAFLEX) 4 mg tablet   3. DDD (degenerative disc disease), cervical M50.30 722.4 REFERRAL TO PAIN MANAGEMENT      oxyCODONE-acetaminophen (PERCOCET) 5-325 mg per tablet      REFERRAL TO PSYCHOLOGY      gabapentin (NEURONTIN) 300 mg capsule      tiZANidine (ZANAFLEX) 4 mg tablet   4. Chronic midline low back pain without sciatica M54.5 724.2 REFERRAL TO PAIN MANAGEMENT    G89.29 338.29 oxyCODONE-acetaminophen (PERCOCET) 5-325 mg per tablet      REFERRAL TO PSYCHOLOGY      gabapentin (NEURONTIN) 300 mg capsule      tiZANidine (ZANAFLEX) 4 mg tablet   5. DDD (degenerative disc disease), lumbar M51.36 722.52 REFERRAL TO PAIN MANAGEMENT      oxyCODONE-acetaminophen (PERCOCET) 5-325 mg per tablet      REFERRAL TO PSYCHOLOGY      gabapentin (NEURONTIN) 300 mg capsule      tiZANidine (ZANAFLEX) 4 mg tablet   6. S/P cervical spinal fusion Z98.1 V45.4 REFERRAL TO PAIN MANAGEMENT      oxyCODONE-acetaminophen (PERCOCET) 5-325 mg per tablet   7. Chronic pain syndrome G89.4 338.4 REFERRAL TO PAIN MANAGEMENT      oxyCODONE-acetaminophen (PERCOCET) 5-325 mg per tablet      REFERRAL TO PSYCHOLOGY      gabapentin (NEURONTIN) 300 mg capsule      tiZANidine (ZANAFLEX) 4 mg tablet   8. Encounter for long-term (current) use of high-risk medication Z79.899 V58.69         1) Medications (opiate and non-opiate)  -- I do not recommend long term opioid therapy for this patient at this time; risks outweigh potential benefits. Pt currently taking Percocet 7.5/325mg up to 3 times a day as needed with a total of 90 tabs to be budgeted over 30 days. Their MME is 33.75.  --Today, we will continue the weaning of patients opioid medication with a goal of being opioid free, pending safety and compliance.  Patient was educated on signs and symptoms of opioid withdrawal and advised to call the clinic should these symptoms arise so that we may provide support as needed. She is provided with Percocet 5/325mg up to 3 times a day as needed with a total of 90 tabs to be budgeted over 30 days. Her new MME will be 22.5. She was informed of the planned clinic closure on 12/12/19 and that this will be the last prescription of narcotic provided from this clinic. She was referred to Dr Nelida Mejia for continuing of care. She was advised to call her PCP to inform them so that they may anticipate providing support for her pain while she awaits establishment at the new clinic. --Continue Gabapentin 600mg QHS, refill provided today. --Continue Zanaflex as needed, refill provided today. --Continue Lidocaine patches as needed. --Stopped using OTC capsaicin patches. --Continue NSAID as needed, take with food. --Continue Tylenol optimization; pt may take a total of 3000mg per acetaminophen from all sources per 24 hour period. 2) Restorative Therapies  --Consider aquatic PT, acupuncture therapy, yoga therapy and/or massage therapy in the future. 3) Interventional Procedures  --Consider interventional pain procedures (SIJ and/or cervical steroid injections or trigger point injections) in the future. 4) Behavorial Health Approaches  --Pt would benefit from referral to pain psychology for training and cognitive behavorial therapy, acceptance commitment therapy, biofeedback and mindfulness mediation and other modalities as indicated for treatment of chronic pain;. Referral provided today to Dr Thomas Pereira. 5) Complementary & Integrative Health  --Follow up ongoing assessment and ongoing development of integrative and comprehensive plan of care for chronic pain. Goals: To establish complementary and integrative plan of care to address chronic pain issues while minimizing pharmaceuticals to maximize patient's function improve quality of life.     Education:  Patient again educated on the importance of strict compliance with the opioid care agreement while on opioid therapy. Patient also again educated that they should avoid driving while on chronic opioid therapy. Also advised to avoid alcohol and to avoid benzodiazepines while on opioid therapy. Handouts given regarding opioid safety. Follow-up and [address] was used for portions of this report. Unintended errors may occur.

## 2019-11-08 NOTE — PROGRESS NOTES
Nursing Notes    Patient presents to the office today in follow-up. Patient rates her pain at 2/10 on the numerical pain scale. Reviewed medications with counts as follows:    Rx Date filled Qty Dispensed Pill Count Last Dose Short   Percocet 7.5 mg 10/21/19 90 32 This am no        reviewed YES  Any aberrancies noted on  NO  Last opioid agreement 05/24/19  Last urine drug screen 09/20/19    Comments:  Patient is here today for a follow up appt today for her chronic joint,neck and back pain. She states her pain level today is a 2  PHQ 2 was done patient denies any depression. Pt states she needs a new Rx for the Gabapentin and Zanaflex          POC UDS was not performed in office today    Any new labs or imaging since last appointment? NO    Have you been to an emergency room (ER) or urgent care clinic since your last visit? NO            Have you been hospitalized since your last visit? NO     If yes, where, when, and reason for visit? Have you seen or consulted any other health care providers outside of the [address] [place] [address]  since your last visit? NO     If yes, where, when, and reason for visit? Ms. Saavedra has a reminder for a \"due or due soon\" health maintenance. I have asked that she contact her primary care provider for follow-up on this health maintenance.

## 2019-11-08 NOTE — PATIENT INSTRUCTIONS

## 2019-11-21 ENCOUNTER — TELEPHONE (OUTPATIENT)
Dept: PAIN MANAGEMENT | Age: 49
End: 2019-11-21

## 2019-11-21 DIAGNOSIS — G89.29 CHRONIC MIDLINE LOW BACK PAIN WITHOUT SCIATICA: ICD-10-CM

## 2019-11-21 DIAGNOSIS — M51.36 DDD (DEGENERATIVE DISC DISEASE), LUMBAR: ICD-10-CM

## 2019-11-21 DIAGNOSIS — Z98.1 S/P CERVICAL SPINAL FUSION: ICD-10-CM

## 2019-11-21 DIAGNOSIS — M54.2 CERVICALGIA: Primary | ICD-10-CM

## 2019-11-21 DIAGNOSIS — M54.50 CHRONIC MIDLINE LOW BACK PAIN WITHOUT SCIATICA: ICD-10-CM

## 2019-11-21 DIAGNOSIS — M54.12 CERVICAL RADICULOPATHY: ICD-10-CM

## 2019-11-21 DIAGNOSIS — M50.30 DDD (DEGENERATIVE DISC DISEASE), CERVICAL: ICD-10-CM

## 2019-11-21 NOTE — TELEPHONE ENCOUNTER
The pt was contacted about the recent referral that was sent out to Dr. Taryn Ralph office. The pt was identified using 2 pt identifiers. She was declined to be seen for treatment. The pt was asked if there were any other providers that she would like to be referred to. She stated that she had called and spoken to Dr. Tony Billy office and they will take her as a patient. Pt was informed that her referral will be generated and faxed out as soon as it is available. She verbalized understanding and has no questions at this time. Message routed to provider for review.

## 2021-05-10 NOTE — PATIENT INSTRUCTIONS
1.  Continue medications as prescribed. 2.  Follow up in two months  3.   Please see ortho for your right shoulder and elbow
Patient presenting with acute AMS 2/2 K2 use. No trauma or injuries noted. History and ROS limited due to altered state.  No evidence of head or extremity trauma. Will observe for clinical sobriety.

## 2021-08-05 ENCOUNTER — APPOINTMENT (OUTPATIENT)
Dept: GENERAL RADIOLOGY | Age: 51
End: 2021-08-05
Attending: EMERGENCY MEDICINE
Payer: COMMERCIAL

## 2021-08-05 ENCOUNTER — HOSPITAL ENCOUNTER (EMERGENCY)
Age: 51
Discharge: HOME | End: 2021-08-05
Attending: EMERGENCY MEDICINE
Payer: COMMERCIAL

## 2021-08-05 VITALS
HEART RATE: 95 BPM | OXYGEN SATURATION: 97 % | TEMPERATURE: 98.6 F | DIASTOLIC BLOOD PRESSURE: 72 MMHG | HEIGHT: 69 IN | BODY MASS INDEX: 39.4 KG/M2 | WEIGHT: 266 LBS | RESPIRATION RATE: 16 BRPM | SYSTOLIC BLOOD PRESSURE: 121 MMHG

## 2021-08-05 DIAGNOSIS — R10.13 ABDOMINAL PAIN, EPIGASTRIC: Primary | ICD-10-CM

## 2021-08-05 LAB
ALBUMIN SERPL-MCNC: 3.8 G/DL (ref 3.5–5)
ALBUMIN/GLOB SERPL: 1.1 {RATIO} (ref 1.1–2.2)
ALP SERPL-CCNC: 67 U/L (ref 45–117)
ALT SERPL-CCNC: 39 U/L (ref 12–78)
ANION GAP SERPL CALC-SCNC: 2 MMOL/L (ref 5–15)
AST SERPL-CCNC: 39 U/L (ref 15–37)
BASOPHILS # BLD: 0.1 K/UL (ref 0–0.1)
BASOPHILS NFR BLD: 1 % (ref 0–1)
BILIRUB SERPL-MCNC: 0.5 MG/DL (ref 0.2–1)
BUN SERPL-MCNC: 11 MG/DL (ref 6–20)
BUN/CREAT SERPL: 14 (ref 12–20)
CALCIUM SERPL-MCNC: 8.5 MG/DL (ref 8.5–10.1)
CHLORIDE SERPL-SCNC: 105 MMOL/L (ref 97–108)
CO2 SERPL-SCNC: 28 MMOL/L (ref 21–32)
COMMENT, HOLDF: NORMAL
CREAT SERPL-MCNC: 0.79 MG/DL (ref 0.55–1.02)
DIFFERENTIAL METHOD BLD: ABNORMAL
EOSINOPHIL # BLD: 0.1 K/UL (ref 0–0.4)
EOSINOPHIL NFR BLD: 2 % (ref 0–7)
ERYTHROCYTE [DISTWIDTH] IN BLOOD BY AUTOMATED COUNT: 12 % (ref 11.5–14.5)
GLOBULIN SER CALC-MCNC: 3.4 G/DL (ref 2–4)
GLUCOSE SERPL-MCNC: 172 MG/DL (ref 65–100)
HCT VFR BLD AUTO: 42.8 % (ref 35–47)
HGB BLD-MCNC: 14.9 G/DL (ref 11.5–16)
IMM GRANULOCYTES # BLD AUTO: 0 K/UL (ref 0–0.04)
IMM GRANULOCYTES NFR BLD AUTO: 0 % (ref 0–0.5)
LIPASE SERPL-CCNC: 83 U/L (ref 73–393)
LYMPHOCYTES # BLD: 2 K/UL (ref 0.8–3.5)
LYMPHOCYTES NFR BLD: 29 % (ref 12–49)
MCH RBC QN AUTO: 31.3 PG (ref 26–34)
MCHC RBC AUTO-ENTMCNC: 34.8 G/DL (ref 30–36.5)
MCV RBC AUTO: 89.9 FL (ref 80–99)
MONOCYTES # BLD: 1.1 K/UL (ref 0–1)
MONOCYTES NFR BLD: 16 % (ref 5–13)
NEUTS SEG # BLD: 3.7 K/UL (ref 1.8–8)
NEUTS SEG NFR BLD: 52 % (ref 32–75)
NRBC # BLD: 0 K/UL (ref 0–0.01)
NRBC BLD-RTO: 0 PER 100 WBC
PLATELET # BLD AUTO: 195 K/UL (ref 150–400)
PMV BLD AUTO: 11 FL (ref 8.9–12.9)
POTASSIUM SERPL-SCNC: 4 MMOL/L (ref 3.5–5.1)
PROT SERPL-MCNC: 7.2 G/DL (ref 6.4–8.2)
RBC # BLD AUTO: 4.76 M/UL (ref 3.8–5.2)
SAMPLES BEING HELD,HOLD: NORMAL
SODIUM SERPL-SCNC: 135 MMOL/L (ref 136–145)
TROPONIN I SERPL-MCNC: <0.05 NG/ML
WBC # BLD AUTO: 7.1 K/UL (ref 3.6–11)

## 2021-08-05 PROCEDURE — C9113 INJ PANTOPRAZOLE SODIUM, VIA: HCPCS | Performed by: EMERGENCY MEDICINE

## 2021-08-05 PROCEDURE — 71045 X-RAY EXAM CHEST 1 VIEW: CPT

## 2021-08-05 PROCEDURE — 74011250636 HC RX REV CODE- 250/636: Performed by: EMERGENCY MEDICINE

## 2021-08-05 PROCEDURE — 96374 THER/PROPH/DIAG INJ IV PUSH: CPT

## 2021-08-05 PROCEDURE — 74011000250 HC RX REV CODE- 250: Performed by: EMERGENCY MEDICINE

## 2021-08-05 PROCEDURE — 93005 ELECTROCARDIOGRAM TRACING: CPT

## 2021-08-05 PROCEDURE — 99284 EMERGENCY DEPT VISIT MOD MDM: CPT

## 2021-08-05 PROCEDURE — 85025 COMPLETE CBC W/AUTO DIFF WBC: CPT

## 2021-08-05 PROCEDURE — 74011250637 HC RX REV CODE- 250/637: Performed by: EMERGENCY MEDICINE

## 2021-08-05 PROCEDURE — 36415 COLL VENOUS BLD VENIPUNCTURE: CPT

## 2021-08-05 PROCEDURE — 84484 ASSAY OF TROPONIN QUANT: CPT

## 2021-08-05 PROCEDURE — 83690 ASSAY OF LIPASE: CPT

## 2021-08-05 PROCEDURE — 80053 COMPREHEN METABOLIC PANEL: CPT

## 2021-08-05 RX ORDER — SUCRALFATE 1 G/1
1 TABLET ORAL 4 TIMES DAILY
Qty: 40 TABLET | Refills: 0 | Status: SHIPPED | OUTPATIENT
Start: 2021-08-05 | End: 2021-08-15

## 2021-08-05 RX ORDER — PANTOPRAZOLE SODIUM 40 MG/1
40 TABLET, DELAYED RELEASE ORAL DAILY
Qty: 20 TABLET | Refills: 0 | Status: SHIPPED | OUTPATIENT
Start: 2021-08-05 | End: 2021-08-25

## 2021-08-05 RX ADMIN — PANTOPRAZOLE SODIUM 40 MG: 40 INJECTION, POWDER, FOR SOLUTION INTRAVENOUS at 21:26

## 2021-08-05 RX ADMIN — ALUMINUM HYDROXIDE, MAGNESIUM HYDROXIDE, AND SIMETHICONE 40 ML: 200; 200; 20 SUSPENSION ORAL at 21:24

## 2021-08-06 NOTE — ED PROVIDER NOTES
Date of Service:  8/5/2021    Patient:  [facility]    Chief Complaint:  Chest Pain       HPI:  [facility] is a 54 y.o.  female who presents for evaluation of chest pain. Patient had midsternal chest discomfort since 9 AM this morning. She states whenever she eats she has a flareup of the discomfort and it goes up with esophagus. She states that she had a stomach ulcer when she was 14 and also notes that food does make this pain worse. She is been struggling with worsening gastric reflux recently. She does not have a gallbladder. She denies any type of abdominal pain otherwise, nausea vomiting diarrhea headaches fevers or chills. Patient states that the pain is worse with eating but nothing else makes it worse.            Past Medical History:   Diagnosis Date    Depression     GERD (gastroesophageal reflux disease)     Hypoglycemia     Insomnia     Migraines     Nicotine vapor product user     6%    Obesity, Class I, BMI 30-34.9     Tinnitus        Past Surgical History:   Procedure Laterality Date    HX ADENOIDECTOMY N/A 1978    HX ANKLE FRACTURE TX Left 1998    HX CHOLECYSTECTOMY  2010    HX COLONOSCOPY      HX ENDOSCOPY      HX SHOULDER ARTHROSCOPY Right [id number]         Family History:   Problem Relation Age of Onset    Hypertension Mother     Alcohol abuse Father     Drug Abuse Father     No Known Problems Sister     Asthma Brother     Hypertension Maternal Grandmother     No Known Problems Sister        Social History     Socioeconomic History    Marital status:      Spouse name: Not on file    Number of children: Not on file    Years of education: Not on file    Highest education level: Not on file   Occupational History    Not on file   Tobacco Use    Smoking status: Former Smoker    Smokeless tobacco: Never Used   Substance and Sexual Activity    Alcohol use: No    Drug use: No    Sexual activity: Not on file   Other Topics Concern    Not on file   Social History Narrative    Not on file     Social Determinants of Health     Financial Resource Strain:     Difficulty of Paying Living Expenses:    Food Insecurity:     Worried About Running Out of Food in the Last Year:     [address] in the Last Year:    Transportation Needs:     Lack of Transportation (Medical):  Lack of Transportation (Non-Medical):    Physical Activity:     Days of Exercise per Week:     Minutes of Exercise per Session:    Stress:     Feeling of Stress :    Social Connections:     Frequency of Communication with Friends and Family:     Frequency of Social Gatherings with Friends and Family:     Attends Denominational Services:     Active Member of Clubs or Organizations:     Attends Club or Organization Meetings:     Marital Status:    Intimate Partner Violence:     Fear of Current or Ex-Partner:     Emotionally Abused:     Physically Abused:     Sexually Abused: ALLERGIES: Wellbutrin [bupropion hcl]    Review of Systems   Constitutional: Negative for fever. HENT: Negative for hearing loss. Eyes: Negative for visual disturbance. Respiratory: Positive for chest tightness. Negative for shortness of breath. Cardiovascular: Negative for chest pain. Gastrointestinal: Positive for abdominal pain. Genitourinary: Negative for flank pain. Musculoskeletal: Negative for back pain. Skin: Negative for rash. Neurological: Negative for dizziness and light-headedness. Psychiatric/Behavioral: Negative for confusion. Vitals:    08/05/21 2039   BP: (!) 159/84   Pulse: 96   Resp: 16   Temp: 98.6 °F (37 °C)   SpO2: 97%   Weight: 120.7 kg (266 lb)   Height: 5' 9\" (1.753 m)            Physical Exam  Vitals and nursing note reviewed. Constitutional:       Appearance: She is well-developed. HENT:      Head: Normocephalic and atraumatic. Cardiovascular:      Rate and Rhythm: Normal rate. Heart sounds: Normal heart sounds.    Pulmonary:      Effort: Pulmonary effort is normal. No tachypnea. Breath sounds: Normal breath sounds. Chest:      Chest wall: No mass or tenderness. Abdominal:      Palpations: Abdomen is soft. Tenderness: There is no abdominal tenderness. Musculoskeletal:         General: Normal range of motion. Cervical back: Normal range of motion. Skin:     General: Skin is warm. Capillary Refill: Capillary refill takes less than 2 seconds. Neurological:      Mental Status: She is alert and oriented to person, place, and time. Psychiatric:         Mood and Affect: Mood normal.          MDM     VITAL SIGNS:  No data found. LABS:  No results found for this or any previous visit (from the past 6 hour(s)). IMAGING:  XR CHEST PORT   Final Result   No acute cardiopulmonary process            Medications During Visit:  Medications   pantoprazole (PROTONIX) 40 mg in 0.9% sodium chloride 10 mL injection (40 mg IntraVENous Given 8/5/21 2126)   mylanta/viscous lidocaine (GI COCKTAIL) (40 mL Oral Given 8/5/21 2124)         DECISION MAKING:  [place] is a 54 y.o. female who comes in as above. Labs and imaging reviewed, all WNL and patient has reproducible pain with food. Most likely GI related with resolution after meds here. Will DC home with med as below and GI follow up. Return as needed. IMPRESSION:  1. Abdominal pain, epigastric        DISPOSITION:  Discharged      Discharge Medication List as of 8/5/2021 10:12 PM      START taking these medications    Details   pantoprazole (Protonix) 40 mg tablet Take 1 Tablet by mouth daily for 20 days. , Normal, Disp-20 Tablet, R-0      sucralfate (Carafate) 1 gram tablet Take 1 Tablet by mouth four (4) times daily for 10 days. , Normal, Disp-40 Tablet, R-0         CONTINUE these medications which have NOT CHANGED    Details   docusate sodium (COLACE) 100 mg capsule Take 1 Cap by mouth two (2) times a day., Print, Disp-60 Cap, R-0      SUMAtriptan (IMITREX) 100 mg tablet Take 100 mg by mouth as needed for Migraine., Historical Med      fremanezumab-vfrm (AJOVY) 225 mg/1.5 mL syrg by SubCUTAneous route every thirty (30) days. Indications: Migraine Prevention, Historical Med      lidocaine (LIDODERM) 5 % 1 Patch by TransDERmal route as needed. Apply patch to the affected area for 12 hours a day and remove for 12 hours a day., Historical Med      esomeprazole magnesium (NEXIUM PO) Take 1 Cap by mouth as needed., Historical Med      traZODone (DESYREL) 100 mg tablet Take 100 mg by mouth nightly., Historical Med      venlafaxine-SR (EFFEXOR XR) 150 mg capsule Take 150 mg by mouth daily. , Historical Med              Follow-up Information     Follow up With Specialties Details Why Contact Info    Laurel Saunders MD Family Medicine Call  As needed [phone] [place] [phone]      Cira Pierre MD Gastroenterology Schedule an appointment as soon as possible for a visit   [address] [facility] Tanner Miller [address] [place] [address]  [phone]              The patient is asked to follow-up with their primary care provider in the next several days. They are to call tomorrow for an appointment. The patient is asked to return promptly for any increased concerns or worsening of symptoms. They can return to this emergency department or any other emergency department.     Procedures

## 2021-08-06 NOTE — ED TRIAGE NOTES
Pt arrives with complaint of mid epigastric pain and back pain for 24 hrs. Pt states pain gets worse after eating. Mylanta has eased the pain.

## 2021-08-06 NOTE — ED NOTES
Pt given discharge instructions and verbalized understanding. Pt ambulatory from ED to home with wife as  of vehicle.

## 2021-08-09 LAB
ATRIAL RATE: 94 BPM
CALCULATED P AXIS, ECG09: 23 DEGREES
CALCULATED R AXIS, ECG10: -12 DEGREES
CALCULATED T AXIS, ECG11: 18 DEGREES
DIAGNOSIS, 93000: NORMAL
P-R INTERVAL, ECG05: 134 MS
Q-T INTERVAL, ECG07: 338 MS
QRS DURATION, ECG06: 84 MS
QTC CALCULATION (BEZET), ECG08: 422 MS
VENTRICULAR RATE, ECG03: 94 BPM

## 2021-08-10 ENCOUNTER — HOSPITAL ENCOUNTER (EMERGENCY)
Age: 51
Discharge: HOME | End: 2021-08-10
Attending: STUDENT IN AN ORGANIZED HEALTH CARE EDUCATION/TRAINING PROGRAM
Payer: COMMERCIAL

## 2021-08-10 VITALS
HEIGHT: 69 IN | WEIGHT: 266 LBS | TEMPERATURE: 97.6 F | OXYGEN SATURATION: 96 % | DIASTOLIC BLOOD PRESSURE: 69 MMHG | BODY MASS INDEX: 39.4 KG/M2 | HEART RATE: 96 BPM | SYSTOLIC BLOOD PRESSURE: 139 MMHG | RESPIRATION RATE: 19 BRPM

## 2021-08-10 DIAGNOSIS — R11.2 NON-INTRACTABLE VOMITING WITH NAUSEA, UNSPECIFIED VOMITING TYPE: ICD-10-CM

## 2021-08-10 DIAGNOSIS — R51.9 ACUTE NONINTRACTABLE HEADACHE, UNSPECIFIED HEADACHE TYPE: Primary | ICD-10-CM

## 2021-08-10 DIAGNOSIS — R73.9 HYPERGLYCEMIA: ICD-10-CM

## 2021-08-10 LAB
ALBUMIN SERPL-MCNC: 3.8 G/DL (ref 3.5–5)
ALBUMIN/GLOB SERPL: 0.9 {RATIO} (ref 1.1–2.2)
ALP SERPL-CCNC: 70 U/L (ref 45–117)
ALT SERPL-CCNC: 25 U/L (ref 12–78)
ANION GAP SERPL CALC-SCNC: 6 MMOL/L (ref 5–15)
AST SERPL-CCNC: 17 U/L (ref 15–37)
BASOPHILS # BLD: 0.1 K/UL (ref 0–0.1)
BASOPHILS NFR BLD: 1 % (ref 0–1)
BILIRUB SERPL-MCNC: 0.9 MG/DL (ref 0.2–1)
BUN SERPL-MCNC: 13 MG/DL (ref 6–20)
BUN/CREAT SERPL: 18 (ref 12–20)
CALCIUM SERPL-MCNC: 8.9 MG/DL (ref 8.5–10.1)
CHLORIDE SERPL-SCNC: 102 MMOL/L (ref 97–108)
CO2 SERPL-SCNC: 26 MMOL/L (ref 21–32)
COMMENT, HOLDF: NORMAL
CREAT SERPL-MCNC: 0.72 MG/DL (ref 0.55–1.02)
DIFFERENTIAL METHOD BLD: ABNORMAL
EOSINOPHIL # BLD: 0 K/UL (ref 0–0.4)
EOSINOPHIL NFR BLD: 0 % (ref 0–7)
ERYTHROCYTE [DISTWIDTH] IN BLOOD BY AUTOMATED COUNT: 11.8 % (ref 11.5–14.5)
GLOBULIN SER CALC-MCNC: 4.1 G/DL (ref 2–4)
GLUCOSE SERPL-MCNC: 229 MG/DL (ref 65–100)
HCT VFR BLD AUTO: 43 % (ref 35–47)
HGB BLD-MCNC: 15.2 G/DL (ref 11.5–16)
IMM GRANULOCYTES # BLD AUTO: 0.1 K/UL (ref 0–0.04)
IMM GRANULOCYTES NFR BLD AUTO: 1 % (ref 0–0.5)
LIPASE SERPL-CCNC: 61 U/L (ref 73–393)
LYMPHOCYTES # BLD: 1.6 K/UL (ref 0.8–3.5)
LYMPHOCYTES NFR BLD: 12 % (ref 12–49)
MAGNESIUM SERPL-MCNC: 2.3 MG/DL (ref 1.6–2.4)
MCH RBC QN AUTO: 30.8 PG (ref 26–34)
MCHC RBC AUTO-ENTMCNC: 35.3 G/DL (ref 30–36.5)
MCV RBC AUTO: 87 FL (ref 80–99)
MONOCYTES # BLD: 1 K/UL (ref 0–1)
MONOCYTES NFR BLD: 8 % (ref 5–13)
NEUTS SEG # BLD: 10 K/UL (ref 1.8–8)
NEUTS SEG NFR BLD: 78 % (ref 32–75)
NRBC # BLD: 0 K/UL (ref 0–0.01)
NRBC BLD-RTO: 0 PER 100 WBC
PLATELET # BLD AUTO: 218 K/UL (ref 150–400)
PMV BLD AUTO: 10.3 FL (ref 8.9–12.9)
POTASSIUM SERPL-SCNC: 3.8 MMOL/L (ref 3.5–5.1)
PROT SERPL-MCNC: 7.9 G/DL (ref 6.4–8.2)
RBC # BLD AUTO: 4.94 M/UL (ref 3.8–5.2)
SAMPLES BEING HELD,HOLD: NORMAL
SODIUM SERPL-SCNC: 134 MMOL/L (ref 136–145)
WBC # BLD AUTO: 12.7 K/UL (ref 3.6–11)

## 2021-08-10 PROCEDURE — 36415 COLL VENOUS BLD VENIPUNCTURE: CPT

## 2021-08-10 PROCEDURE — 74011250636 HC RX REV CODE- 250/636: Performed by: STUDENT IN AN ORGANIZED HEALTH CARE EDUCATION/TRAINING PROGRAM

## 2021-08-10 PROCEDURE — 96375 TX/PRO/DX INJ NEW DRUG ADDON: CPT

## 2021-08-10 PROCEDURE — 96361 HYDRATE IV INFUSION ADD-ON: CPT

## 2021-08-10 PROCEDURE — 85025 COMPLETE CBC W/AUTO DIFF WBC: CPT

## 2021-08-10 PROCEDURE — 96374 THER/PROPH/DIAG INJ IV PUSH: CPT

## 2021-08-10 PROCEDURE — 99283 EMERGENCY DEPT VISIT LOW MDM: CPT

## 2021-08-10 PROCEDURE — 80053 COMPREHEN METABOLIC PANEL: CPT

## 2021-08-10 PROCEDURE — 83735 ASSAY OF MAGNESIUM: CPT

## 2021-08-10 PROCEDURE — 83690 ASSAY OF LIPASE: CPT

## 2021-08-10 RX ORDER — ONDANSETRON 2 MG/ML
4 INJECTION INTRAMUSCULAR; INTRAVENOUS
Status: COMPLETED | OUTPATIENT
Start: 2021-08-10 | End: 2021-08-10

## 2021-08-10 RX ORDER — METOCLOPRAMIDE HYDROCHLORIDE 5 MG/ML
10 INJECTION INTRAMUSCULAR; INTRAVENOUS
Status: COMPLETED | OUTPATIENT
Start: 2021-08-10 | End: 2021-08-10

## 2021-08-10 RX ORDER — PROMETHAZINE HYDROCHLORIDE 25 MG/1
25 TABLET ORAL
Qty: 12 TABLET | Refills: 0 | Status: SHIPPED | OUTPATIENT
Start: 2021-08-10

## 2021-08-10 RX ORDER — KETOROLAC TROMETHAMINE 30 MG/ML
30 INJECTION, SOLUTION INTRAMUSCULAR; INTRAVENOUS
Status: COMPLETED | OUTPATIENT
Start: 2021-08-10 | End: 2021-08-10

## 2021-08-10 RX ORDER — DIPHENHYDRAMINE HYDROCHLORIDE 50 MG/ML
25 INJECTION, SOLUTION INTRAMUSCULAR; INTRAVENOUS ONCE
Status: COMPLETED | OUTPATIENT
Start: 2021-08-10 | End: 2021-08-10

## 2021-08-10 RX ADMIN — SODIUM CHLORIDE 1000 ML: 9 INJECTION, SOLUTION INTRAVENOUS at 21:03

## 2021-08-10 RX ADMIN — KETOROLAC TROMETHAMINE 30 MG: 30 INJECTION, SOLUTION INTRAMUSCULAR at 22:08

## 2021-08-10 RX ADMIN — ONDANSETRON 4 MG: 2 INJECTION INTRAMUSCULAR; INTRAVENOUS at 21:05

## 2021-08-10 RX ADMIN — DIPHENHYDRAMINE HYDROCHLORIDE 25 MG: 50 INJECTION, SOLUTION INTRAMUSCULAR; INTRAVENOUS at 22:09

## 2021-08-10 RX ADMIN — METOCLOPRAMIDE 10 MG: 5 INJECTION, SOLUTION INTRAMUSCULAR; INTRAVENOUS at 22:10

## 2021-08-11 NOTE — ED PROVIDER NOTES
Patient is a 43-year-old female with history of migraine headaches. For the last 2 days she has had worse headache with nausea and vomiting. Was recently seen diagnosed with gastroenteritis prior to that. Patient seen at [facility] and Covid swab sent with results pending. The history is provided by the patient, the spouse and medical records. Vomiting   This is a new problem. The current episode started 2 days ago. The problem occurs 5 to 10 times per day. The problem has not changed since onset. The emesis has an appearance of stomach contents. There has been a fever of 100 - 100.9 F. The fever has been present for less than 1 day. Associated symptoms include a fever, headaches and headaches. Pertinent negatives include no diarrhea. The patient is not pregnant. Risk factors include ill contacts. Headache   This is a recurrent problem. The current episode started 2 days ago. The problem occurs constantly. The problem has not changed since onset. The headache is aggravated by bright light, fever, vomiting, visual complaints and nausea. The pain is located in the generalized region. The quality of the pain is described as sharp. The pain is at a severity of 8/10. The pain is moderate. Associated symptoms include a fever, nausea and vomiting. Treatments tried: Unable to take usual oral medications due to nausea.         Past Medical History:   Diagnosis Date    Depression     GERD (gastroesophageal reflux disease)     Hypoglycemia     Insomnia     Migraines     Nicotine vapor product user     6%    Obesity, Class I, BMI 30-34.9     Tinnitus        Past Surgical History:   Procedure Laterality Date    HX ADENOIDECTOMY N/A 1978    HX ANKLE FRACTURE TX Left 1998    HX CHOLECYSTECTOMY  2010    HX COLONOSCOPY      HX ENDOSCOPY      HX SHOULDER ARTHROSCOPY Right 1973,0675         Family History:   Problem Relation Age of Onset    Hypertension Mother     Alcohol abuse Father     Drug Abuse Father  No Known Problems Sister     Asthma Brother     Hypertension Maternal Grandmother     No Known Problems Sister        Social History     Socioeconomic History    Marital status:      Spouse name: Not on file    Number of children: Not on file    Years of education: Not on file    Highest education level: Not on file   Occupational History    Not on file   Tobacco Use    Smoking status: Former Smoker    Smokeless tobacco: Never Used   Substance and Sexual Activity    Alcohol use: No    Drug use: No    Sexual activity: Not on file   Other Topics Concern    Not on file   Social History Narrative    Not on file     Social Determinants of Health     Financial Resource Strain:     Difficulty of Paying Living Expenses:    Food Insecurity:     Worried About [address] in the Last Year:     [address] [employer] in the Last Year:    Transportation Needs:     Lack of Transportation (Medical):  Lack of Transportation (Non-Medical):    Physical Activity:     Days of Exercise per Week:     Minutes of Exercise per Session:    Stress:     Feeling of Stress :    Social Connections:     Frequency of Communication with Friends and Family:     Frequency of Social Gatherings with Friends and Family:     Attends Confucianism Services:     Active Member of Clubs or Organizations:     Attends Club or Organization Meetings:     Marital Status:    Intimate Partner Violence:     Fear of Current or Ex-Partner:     Emotionally Abused:     Physically Abused:     Sexually Abused: ALLERGIES: Wellbutrin [bupropion hcl]    Review of Systems   Constitutional: Positive for fever. HENT: Negative. Eyes: Negative. Respiratory: Negative. Cardiovascular: Negative. Gastrointestinal: Positive for nausea and vomiting. Negative for constipation and diarrhea. Endocrine: Negative. Genitourinary: Negative. Musculoskeletal: Negative. Skin: Negative. Allergic/Immunologic: Negative. Neurological: Positive for headaches. Hematological: Negative. Psychiatric/Behavioral: Negative. Vitals:    08/10/21 2031 08/10/21 2200 08/10/21 2213   BP: (!) 175/93     Pulse: (!) 120  99   Resp: 20     Temp: (!) 100.5 °F (38.1 °C)     SpO2: 96% 98%    Weight: 120.7 kg (266 lb)     Height: 5' 9\" (1.753 m)              Physical Exam  Vitals and nursing note reviewed. Constitutional:       General: She is not in acute distress. Appearance: Normal appearance. She is obese. She is ill-appearing. HENT:      Head: Normocephalic and atraumatic. Right Ear: External ear normal.      Left Ear: External ear normal.      Nose: Nose normal.      Mouth/Throat:      Mouth: Mucous membranes are moist.      Pharynx: Oropharynx is clear. No posterior oropharyngeal erythema. Eyes:      Extraocular Movements: Extraocular movements intact. Conjunctiva/sclera: Conjunctivae normal.   Cardiovascular:      Rate and Rhythm: Normal rate. Pulses: Normal pulses. Heart sounds: Normal heart sounds. Pulmonary:      Effort: Pulmonary effort is normal.      Breath sounds: Normal breath sounds. Chest:      Chest wall: No deformity or tenderness. Abdominal:      General: Abdomen is flat. There is no distension. Tenderness: There is no abdominal tenderness. Musculoskeletal:         General: No deformity or signs of injury. Normal range of motion. Cervical back: Normal range of motion and neck supple. No tenderness. Skin:     General: Skin is warm and dry. Capillary Refill: Capillary refill takes less than 2 seconds. Neurological:      General: No focal deficit present. Mental Status: She is alert and oriented to person, place, and time. Psychiatric:         Mood and Affect: Mood normal.         Behavior: Behavior normal.         Thought Content:  Thought content normal.         Judgment: Judgment normal.          MDM     LABORATORY RESULTS:  Labs Reviewed   CBC WITH AUTOMATED DIFF - Abnormal; Notable for the following components:       Result Value    WBC 12.7 (*)     NEUTROPHILS 78 (*)     IMMATURE GRANULOCYTES 1 (*)     ABS. NEUTROPHILS 10.0 (*)     ABS. IMM. GRANS. 0.1 (*)     All other components within normal limits   METABOLIC PANEL, COMPREHENSIVE - Abnormal; Notable for the following components:    Sodium 134 (*)     Glucose 229 (*)     Globulin 4.1 (*)     A-G Ratio 0.9 (*)     All other components within normal limits   LIPASE - Abnormal; Notable for the following components:    Lipase 61 (*)     All other components within normal limits   SAMPLES BEING HELD   MAGNESIUM       IMAGING RESULTS:  No orders to display       MEDICATIONS GIVEN:  Medications   ondansetron (ZOFRAN) injection 4 mg (4 mg IntraVENous Given 8/10/21 2105)   sodium chloride 0.9 % bolus infusion 1,000 mL (0 mL IntraVENous IV Completed 8/10/21 2314)   ketorolac (TORADOL) injection 30 mg (30 mg IntraVENous Given 8/10/21 2208)   metoclopramide HCl (REGLAN) injection 10 mg (10 mg IntraVENous Given 8/10/21 2210)   diphenhydrAMINE (BENADRYL) injection 25 mg (25 mg IntraVENous Given 8/10/21 2209)       Differential diagnosis: Nausea and vomiting, gastroenteritis, viral prodrome, complex migraine headache, COVID-19. ED physician interpretation of laboratory results: Hyperglycemia on lab work, patient and spouse deny diabetes diagnosis. This may be elevated due to stress response of nausea and vomiting for the last several days. Outpatient work-up indicated patient's and spouse state understanding. Mild leukocytosis consistent with patient's history of 2 days of nausea vomiting and low-grade fever. Otherwise patient's lab work unremarkable.     MDM: Patient is a 51-year-old female presenting ED with several days of nausea vomiting and significant migraine headache with stable vital signs, fever improving on antipyretics, no signs of dehydration on lab work with resolution of migraine headache with IV medications appropriate for discharge home. Patient vitals are stable, patient has low fever of 100.5. On recheck temperature was normal limits. Patient has no focal neurologic deficits, family and patient deny any altered mental status. Patient has Covid swab pending at a nearby clinic. Patient's primary reason for exam ED was severe headache that she was unable to treat at home due to her nausea and vomiting and inability to take her p.o. medications. As nausea and vomiting has resolved, patient is tolerating p.o. and headache is much improved with medications patient and spouse are comfortable discharge home. As patient has fever and headache discussion about lumbar puncture was had. Family declined and stated if things do not improve they will return for further treatment and evaluation. Most likely this is a viral GI bug causing inability to take normal medications leading to intractable headache required IV medications. Key discharge instructions: You presented to the ED with 2 days of nausea vomiting and worsening migraine headache. Lab work shows a mild elevation white blood cell count which could be from nausea and vomiting. Additionally you were diagnosed gastroenteritis several days ago. This may be a continuation of that diagnosis. Her headache was likely triggered by this. You had mild fever that improved with IV fluids. Headache improved with IV medications. Prescription written for Phenergan as you state this helps with your nausea in the past.  Strict return precautions given. The patient has been re-evaluated and feeling much better and are stable for discharge. All available radiology and laboratory results have been reviewed with patient and/or available family.   Patient and/or family verbally conveyed their understanding and agreement of the patient's signs, symptoms, diagnosis, treatment and prognosis and additionally agree to follow-up as recommended in the discharge instructions or to return to the Emergency Department should their condition change or worsen prior to their follow-up appointment. All questions have been answered and patient and/or available family express understanding. IMPRESSION:  1. Acute nonintractable headache, unspecified headache type    2. Non-intractable vomiting with nausea, unspecified vomiting type    3. Hyperglycemia        DISPOSITION: Discharged    Kulwant Rodriguez MD        Procedures

## 2021-08-11 NOTE — ED NOTES
Patient seen by provider prior to discharge and no further questions. Discharge papers given to patient by RN. Ambulatory to home and no apparent distress.      Visit Vitals  /69   Pulse 96   Temp 97.6 °F (36.4 °C)   Resp 19   Ht 5' 9\" (1.753 m)   Wt 120.7 kg (266 lb)   SpO2 96%   BMI 39.28 kg/m²

## 2021-08-11 NOTE — ED TRIAGE NOTES
Pt reports nausea and vomiting onset about 2 days ago accompanied by fevers, headache, photophobia, and abdominal pain. Unable to tolerate PO intake for the past 2 days. Had a covid test done but has not yet received results. Hx of migraines, took sumatriptan yesterday but unable to keep it down. Seen in the ED on 8/5 for epigastric pain.

## 2021-08-11 NOTE — DISCHARGE INSTRUCTIONS
You presented to the ED with 2 days of nausea vomiting and worsening migraine headache. Lab work shows a mild elevation white blood cell count which could be from nausea and vomiting. Additionally you were diagnosed gastroenteritis several days ago. This may be a continuation of that diagnosis. Her headache was likely triggered by this. You had mild fever that improved with IV fluids. Headache improved with IV medications.

## 2022-03-19 PROBLEM — M48.02 CERVICAL STENOSIS OF SPINAL CANAL: Status: ACTIVE | Noted: 2019-07-29

## 2022-08-12 NOTE — TELEPHONE ENCOUNTER
Pt called requesting a refill for oxycodone. She gets 80% relief, can do daily tasks, no side effects. Quality 226: Preventive Care And Screening: Tobacco Use: Screening And Cessation Intervention: Patient screened for tobacco use and is an ex/non-smoker Quality 130: Documentation Of Current Medications In The Medical Record: Current Medications Documented Quality 431: Preventive Care And Screening: Unhealthy Alcohol Use - Screening: Patient not identified as an unhealthy alcohol user when screened for unhealthy alcohol use using a systematic screening method Detail Level: Detailed

## 2023-11-02 NOTE — PROGRESS NOTES
Nursing Notes    Patient presents to the office today in follow-up. Patient rates her pain at 2/10 on the numerical pain scale. Reviewed medications with counts as follows:    Rx Date filled Qty Dispensed Pill Count Last Dose Short   Percocet 7.5/325 mg  01/30/17 90 51 today no                                  POC UDS was not performed in office today    Any new labs or imaging since last appointment? YES. Pt states that she had an MRI done of her right shoulder    Have you been to an emergency room (ER) or urgent care clinic since your last visit? NO            Have you been hospitalized since your last visit? NO     If yes, where, when, and reason for visit? Have you seen or consulted any other health care providers outside of the [address] [place] [address]  since your last visit? YES     If yes, where, when, and reason for visit? GI    HM deferred to pcp. [facility]

## 2024-01-31 ENCOUNTER — HOSPITAL ENCOUNTER (OUTPATIENT)
Facility: HOSPITAL | Age: 54
Discharge: HOME | End: 2024-02-03
Attending: INTERNAL MEDICINE

## 2024-01-31 DIAGNOSIS — K75.81 NASH (NONALCOHOLIC STEATOHEPATITIS): ICD-10-CM

## 2024-01-31 PROCEDURE — 77080 DXA BONE DENSITY AXIAL: CPT

## 2024-01-31 PROCEDURE — 76700 US EXAM ABDOM COMPLETE: CPT

## 2024-02-15 ENCOUNTER — HOSPITAL ENCOUNTER (OUTPATIENT)
Facility: HOSPITAL | Age: 54
Discharge: HOME | End: 2024-02-15
Payer: COMMERCIAL

## 2024-02-15 DIAGNOSIS — N28.89 RENAL MASS: ICD-10-CM

## 2024-02-15 PROCEDURE — 74170 CT ABD WO CNTRST FLWD CNTRST: CPT

## 2024-02-15 PROCEDURE — 6360000004 HC RX CONTRAST MEDICATION: Performed by: INTERNAL MEDICINE

## 2024-02-15 RX ADMIN — IOPAMIDOL 100 ML: 755 INJECTION, SOLUTION INTRAVENOUS at 18:43

## 2024-04-17 ENCOUNTER — HOSPITAL ENCOUNTER (OUTPATIENT)
Facility: HOSPITAL | Age: 54
Discharge: HOME | End: 2024-04-20
Attending: INTERNAL MEDICINE

## 2024-04-17 DIAGNOSIS — K75.81 NASH (NONALCOHOLIC STEATOHEPATITIS): ICD-10-CM

## 2024-04-17 PROCEDURE — 76700 US EXAM ABDOM COMPLETE: CPT

## 2024-04-19 ENCOUNTER — HOSPITAL ENCOUNTER (OUTPATIENT)
Facility: HOSPITAL | Age: 54
Setting detail: SPECIMEN
Discharge: HOME | End: 2024-04-22

## 2024-04-19 LAB
ERYTHROCYTE [DISTWIDTH] IN BLOOD BY AUTOMATED COUNT: 13 % (ref 11.5–14.5)
HCT VFR BLD AUTO: 44.3 % (ref 35–47)
HGB BLD-MCNC: 15.8 G/DL (ref 11.5–16)
INR PPP: 1.1 (ref 0.9–1.1)
MCH RBC QN AUTO: 31.9 PG (ref 26–34)
MCHC RBC AUTO-ENTMCNC: 35.7 G/DL (ref 30–36.5)
MCV RBC AUTO: 89.3 FL (ref 80–99)
NRBC # BLD: 0 K/UL (ref 0–0.01)
NRBC BLD-RTO: 0 PER 100 WBC
PLATELET # BLD AUTO: 197 K/UL (ref 150–400)
PMV BLD AUTO: 11.7 FL (ref 8.9–12.9)
PROTHROMBIN TIME: 11.1 SEC (ref 9–11.1)
RBC # BLD AUTO: 4.96 M/UL (ref 3.8–5.2)
WBC # BLD AUTO: 8.3 K/UL (ref 3.6–11)

## 2024-04-19 PROCEDURE — 85027 COMPLETE CBC AUTOMATED: CPT

## 2024-04-19 PROCEDURE — 36415 COLL VENOUS BLD VENIPUNCTURE: CPT

## 2024-04-19 PROCEDURE — 85610 PROTHROMBIN TIME: CPT

## 2024-04-23 ENCOUNTER — HOSPITAL ENCOUNTER (OUTPATIENT)
Facility: HOSPITAL | Age: 54
Discharge: HOME | End: 2024-04-26
Attending: INTERNAL MEDICINE

## 2024-04-23 VITALS
HEIGHT: 69 IN | TEMPERATURE: 98.9 F | BODY MASS INDEX: 36.14 KG/M2 | OXYGEN SATURATION: 96 % | HEART RATE: 87 BPM | SYSTOLIC BLOOD PRESSURE: 138 MMHG | WEIGHT: 244 LBS | DIASTOLIC BLOOD PRESSURE: 73 MMHG | RESPIRATION RATE: 16 BRPM

## 2024-04-23 DIAGNOSIS — K75.81 NASH (NONALCOHOLIC STEATOHEPATITIS): ICD-10-CM

## 2024-04-23 PROCEDURE — 76942 ECHO GUIDE FOR BIOPSY: CPT

## 2024-04-23 PROCEDURE — 6370000000 HC RX 637 (ALT 250 FOR IP): Performed by: NURSE PRACTITIONER

## 2024-04-23 PROCEDURE — 2580000003 HC RX 258: Performed by: NURSE PRACTITIONER

## 2024-04-23 PROCEDURE — 6360000002 HC RX W HCPCS: Performed by: NURSE PRACTITIONER

## 2024-04-23 PROCEDURE — 2500000003 HC RX 250 WO HCPCS: Performed by: NURSE PRACTITIONER

## 2024-04-23 RX ORDER — OXYCODONE HYDROCHLORIDE 5 MG/1
5 CAPSULE ORAL EVERY 4 HOURS PRN
COMMUNITY

## 2024-04-23 RX ORDER — FENTANYL CITRATE 50 UG/ML
INJECTION, SOLUTION INTRAMUSCULAR; INTRAVENOUS PRN
Status: COMPLETED | OUTPATIENT
Start: 2024-04-23 | End: 2024-04-23

## 2024-04-23 RX ORDER — DOCUSATE SODIUM 100 MG/1
100 CAPSULE, LIQUID FILLED ORAL 2 TIMES DAILY
COMMUNITY
Start: 2019-07-30

## 2024-04-23 RX ORDER — MIDAZOLAM HYDROCHLORIDE 2 MG/2ML
INJECTION, SOLUTION INTRAMUSCULAR; INTRAVENOUS PRN
Status: COMPLETED | OUTPATIENT
Start: 2024-04-23 | End: 2024-04-23

## 2024-04-23 RX ORDER — LIDOCAINE HYDROCHLORIDE 10 MG/ML
INJECTION, SOLUTION EPIDURAL; INFILTRATION; INTRACAUDAL; PERINEURAL PRN
Status: COMPLETED | OUTPATIENT
Start: 2024-04-23 | End: 2024-04-23

## 2024-04-23 RX ORDER — KETOCONAZOLE 20 MG/G
CREAM TOPICAL
COMMUNITY
Start: 2019-06-07

## 2024-04-23 RX ORDER — CELECOXIB 200 MG/1
CAPSULE ORAL
COMMUNITY

## 2024-04-23 RX ORDER — OXYCODONE HYDROCHLORIDE AND ACETAMINOPHEN 5; 325 MG/1; MG/1
1 TABLET ORAL ONCE
Status: COMPLETED | OUTPATIENT
Start: 2024-04-23 | End: 2024-04-23

## 2024-04-23 RX ORDER — SODIUM CHLORIDE 9 MG/ML
INJECTION, SOLUTION INTRAVENOUS CONTINUOUS PRN
Status: COMPLETED | OUTPATIENT
Start: 2024-04-23 | End: 2024-04-23

## 2024-04-23 RX ORDER — DOXYCYCLINE 100 MG/10ML
INJECTION, POWDER, LYOPHILIZED, FOR SOLUTION INTRAVENOUS
COMMUNITY

## 2024-04-23 RX ORDER — VENLAFAXINE HYDROCHLORIDE 150 MG/1
150 CAPSULE, EXTENDED RELEASE ORAL DAILY
COMMUNITY

## 2024-04-23 RX ORDER — ATORVASTATIN CALCIUM 10 MG/1
10 TABLET, FILM COATED ORAL DAILY
COMMUNITY
Start: 2024-03-28

## 2024-04-23 RX ORDER — GABAPENTIN 100 MG/1
200 CAPSULE ORAL 3 TIMES DAILY
COMMUNITY

## 2024-04-23 RX ADMIN — OXYCODONE AND ACETAMINOPHEN 1 TABLET: 5; 325 TABLET ORAL at 12:32

## 2024-04-23 RX ADMIN — FENTANYL CITRATE 50 MCG: 50 INJECTION INTRAMUSCULAR; INTRAVENOUS at 11:07

## 2024-04-23 RX ADMIN — MIDAZOLAM HYDROCHLORIDE 1 MG: 1 INJECTION, SOLUTION INTRAMUSCULAR; INTRAVENOUS at 11:02

## 2024-04-23 RX ADMIN — MIDAZOLAM HYDROCHLORIDE 1 MG: 1 INJECTION, SOLUTION INTRAMUSCULAR; INTRAVENOUS at 11:07

## 2024-04-23 RX ADMIN — FENTANYL CITRATE 50 MCG: 50 INJECTION INTRAMUSCULAR; INTRAVENOUS at 11:02

## 2024-04-23 RX ADMIN — LIDOCAINE HYDROCHLORIDE 10 ML: 10 INJECTION, SOLUTION EPIDURAL; INFILTRATION; INTRACAUDAL; PERINEURAL at 11:05

## 2024-04-23 RX ADMIN — SODIUM CHLORIDE 125 ML/HR: 9 INJECTION, SOLUTION INTRAVENOUS at 10:59

## 2024-04-23 ASSESSMENT — PAIN DESCRIPTION - ORIENTATION
ORIENTATION: RIGHT;UPPER
ORIENTATION: RIGHT;UPPER

## 2024-04-23 ASSESSMENT — PAIN SCALES - GENERAL
PAINLEVEL_OUTOF10: 5
PAINLEVEL_OUTOF10: 4
PAINLEVEL_OUTOF10: 2

## 2024-04-23 ASSESSMENT — PAIN DESCRIPTION - LOCATION
LOCATION: ABDOMEN
LOCATION: ABDOMEN

## 2024-04-23 ASSESSMENT — PAIN DESCRIPTION - DESCRIPTORS: DESCRIPTORS: ACHING;CRAMPING;SHARP

## 2024-04-23 NOTE — PROGRESS NOTES
Pt arrives ambulatory to angio department accompanied by wife Devika for US guided liver biopsy procedure. All assessments completed and consent was reviewed.  Education given was regarding procedure, moderatee sedation, post-procedure care and  management/follow-up. Opportunity for questions was provided and all questions and concerns were addressed.      Pt declined pregnancy test as patient has female partner and is asexual. Waiver signed by pt.

## 2024-04-23 NOTE — H&P
Dispense Refill    docusate sodium (COLACE) 100 MG capsule Take 1 capsule by mouth 2 times daily      atorvastatin (LIPITOR) 10 MG tablet Take 1 tablet by mouth daily      vitamin D3 (CHOLECALCIFEROL) 125 MCG (5000 UT) TABS tablet TK 2 TS PO D FOR VITAMIN D DEFICIENCY      ketoconazole (NIZORAL) 2 % cream CARIE EXT AA BID FOR 14 DAYS      oxyCODONE 5 MG capsule Take 1 capsule by mouth every 4 hours as needed for Pain. Max Daily Amount: 30 mg      venlafaxine (EFFEXOR XR) 150 MG extended release capsule Take 1 capsule by mouth daily      celecoxib (CELEBREX) 200 MG capsule       doxycycline (DOXY 100) 100 MG injection       esomeprazole (NEXIUM) 20 MG delayed release capsule 1 capsule      gabapentin (NEURONTIN) 100 MG capsule Take 2 capsules by mouth in the morning, at noon, and at bedtime.       No current facility-administered medications for this encounter.     ALLERGIES  Allergies   Allergen Reactions    Adhesive Tape Dermatitis    Wellbutrin [Bupropion] Other (See Comments)     Pt becomes \"manic\"       DIAGNOSTIC STUDIES   IMAGING STUDIES  Relevant Imaging studies reviewed    LABS  Lab Results   Component Value Date/Time    WBC 8.3 04/19/2024 10:51 AM    HGB 15.8 04/19/2024 10:51 AM    HCT 44.3 04/19/2024 10:51 AM     04/19/2024 10:51 AM    MCV 89.3 04/19/2024 10:51 AM     Lab Results   Component Value Date/Time     08/10/2021 08:54 PM    K 3.8 08/10/2021 08:54 PM     08/10/2021 08:54 PM    CO2 26 08/10/2021 08:54 PM    BUN 13 08/10/2021 08:54 PM    GFRAA >60 08/10/2021 08:54 PM     Lab Results   Component Value Date/Time    INR 1.1 04/19/2024 10:51 AM       PHYSICAL EXAM   /80   Pulse 86   Temp 98.9 °F (37.2 °C) (Oral)   Resp 16   Ht 1.753 m (5' 9\")   Wt 110.7 kg (244 lb)   SpO2 96%   BMI 36.03 kg/m²    General:  NAD  Heart:  RRR  Lungs:  NWOB  Neurological:  AAOX3      ASSESSMENT / PLAN   Procedure to be performed:  US guided liver biopsy  Plan for sedation:  moderate  Mallampati

## 2024-11-12 ENCOUNTER — TRANSCRIBE ORDERS (OUTPATIENT)
Facility: HOSPITAL | Age: 54
End: 2024-11-12

## 2024-11-12 DIAGNOSIS — K75.81 NASH (NONALCOHOLIC STEATOHEPATITIS): Primary | ICD-10-CM

## 2024-11-18 NOTE — TELEPHONE ENCOUNTER
The pt called the office to report that she went to see the orthopedic doctor that [facility] wanted her to see. She was told by the surgeon that she will need to have surgery on her shoulder. She will be having a ganglion cyst removed and a nerve decompression done on 03/22/17. The pt states that the surgeon is wanting our office to take control of her post surgical pain. I called the pt and was not able to reach her on any number that was listed. I was able to leave a message for the pt on one number and the number to the nurse triage line was provided. numerical [phone]

## 2024-12-11 ENCOUNTER — HOSPITAL ENCOUNTER (OUTPATIENT)
Facility: HOSPITAL | Age: 54
Discharge: HOME | End: 2024-12-14
Attending: INTERNAL MEDICINE

## 2024-12-11 DIAGNOSIS — K75.81 NASH (NONALCOHOLIC STEATOHEPATITIS): ICD-10-CM

## 2024-12-11 PROCEDURE — 76700 US EXAM ABDOM COMPLETE: CPT

## 2025-03-28 ENCOUNTER — TRANSCRIBE ORDERS (OUTPATIENT)
Facility: HOSPITAL | Age: 55
End: 2025-03-28

## 2025-03-28 DIAGNOSIS — K75.81 NASH (NONALCOHOLIC STEATOHEPATITIS): Primary | ICD-10-CM

## 2025-04-03 ENCOUNTER — TRANSCRIBE ORDERS (OUTPATIENT)
Facility: HOSPITAL | Age: 55
End: 2025-04-03

## 2025-04-03 DIAGNOSIS — K75.81 NASH (NONALCOHOLIC STEATOHEPATITIS): Primary | ICD-10-CM

## 2025-04-21 ENCOUNTER — HOSPITAL ENCOUNTER (OUTPATIENT)
Facility: HOSPITAL | Age: 55
Discharge: HOME | End: 2025-04-24
Attending: INTERNAL MEDICINE

## 2025-04-21 VITALS — HEIGHT: 69 IN | BODY MASS INDEX: 36.88 KG/M2 | WEIGHT: 249 LBS

## 2025-04-21 DIAGNOSIS — K75.81 NASH (NONALCOHOLIC STEATOHEPATITIS): ICD-10-CM

## 2025-04-21 PROCEDURE — 76700 US EXAM ABDOM COMPLETE: CPT

## 2025-04-21 PROCEDURE — 77080 DXA BONE DENSITY AXIAL: CPT

## 2025-04-22 ENCOUNTER — HOSPITAL ENCOUNTER (OUTPATIENT)
Facility: HOSPITAL | Age: 55
Discharge: HOME | End: 2025-04-25
Attending: INTERNAL MEDICINE

## 2025-04-22 VITALS
DIASTOLIC BLOOD PRESSURE: 76 MMHG | BODY MASS INDEX: 22.07 KG/M2 | OXYGEN SATURATION: 96 % | HEIGHT: 69 IN | WEIGHT: 149 LBS | TEMPERATURE: 98.5 F | RESPIRATION RATE: 22 BRPM | HEART RATE: 79 BPM | SYSTOLIC BLOOD PRESSURE: 120 MMHG

## 2025-04-22 DIAGNOSIS — K75.81 NASH (NONALCOHOLIC STEATOHEPATITIS): ICD-10-CM

## 2025-04-22 PROCEDURE — 2580000003 HC RX 258: Performed by: PHYSICIAN ASSISTANT

## 2025-04-22 PROCEDURE — 76942 ECHO GUIDE FOR BIOPSY: CPT

## 2025-04-22 PROCEDURE — 47000 NEEDLE BIOPSY OF LIVER PERQ: CPT

## 2025-04-22 PROCEDURE — 6360000002 HC RX W HCPCS: Performed by: PHYSICIAN ASSISTANT

## 2025-04-22 RX ORDER — LIDOCAINE HYDROCHLORIDE 10 MG/ML
INJECTION, SOLUTION EPIDURAL; INFILTRATION; INTRACAUDAL; PERINEURAL PRN
Status: COMPLETED | OUTPATIENT
Start: 2025-04-22 | End: 2025-04-22

## 2025-04-22 RX ORDER — SODIUM CHLORIDE 9 MG/ML
INJECTION, SOLUTION INTRAVENOUS CONTINUOUS PRN
Status: COMPLETED | OUTPATIENT
Start: 2025-04-22 | End: 2025-04-22

## 2025-04-22 RX ORDER — FENTANYL CITRATE 50 UG/ML
INJECTION, SOLUTION INTRAMUSCULAR; INTRAVENOUS PRN
Status: COMPLETED | OUTPATIENT
Start: 2025-04-22 | End: 2025-04-22

## 2025-04-22 RX ORDER — MIDAZOLAM HYDROCHLORIDE 2 MG/2ML
INJECTION, SOLUTION INTRAMUSCULAR; INTRAVENOUS PRN
Status: COMPLETED | OUTPATIENT
Start: 2025-04-22 | End: 2025-04-22

## 2025-04-22 RX ADMIN — MIDAZOLAM HYDROCHLORIDE 0.5 MG: 1 INJECTION, SOLUTION INTRAMUSCULAR; INTRAVENOUS at 10:03

## 2025-04-22 RX ADMIN — MIDAZOLAM HYDROCHLORIDE 1 MG: 1 INJECTION, SOLUTION INTRAMUSCULAR; INTRAVENOUS at 09:59

## 2025-04-22 RX ADMIN — LIDOCAINE HYDROCHLORIDE 10 ML: 10 INJECTION, SOLUTION EPIDURAL; INFILTRATION; INTRACAUDAL; PERINEURAL at 10:03

## 2025-04-22 RX ADMIN — FENTANYL CITRATE 50 MCG: 50 INJECTION INTRAMUSCULAR; INTRAVENOUS at 09:56

## 2025-04-22 RX ADMIN — MIDAZOLAM HYDROCHLORIDE 0.5 MG: 1 INJECTION, SOLUTION INTRAMUSCULAR; INTRAVENOUS at 10:09

## 2025-04-22 RX ADMIN — FENTANYL CITRATE 50 MCG: 50 INJECTION INTRAMUSCULAR; INTRAVENOUS at 10:03

## 2025-04-22 RX ADMIN — FENTANYL CITRATE 50 MCG: 50 INJECTION INTRAMUSCULAR; INTRAVENOUS at 09:59

## 2025-04-22 RX ADMIN — MIDAZOLAM HYDROCHLORIDE 1 MG: 1 INJECTION, SOLUTION INTRAMUSCULAR; INTRAVENOUS at 09:56

## 2025-04-22 RX ADMIN — SODIUM CHLORIDE 125 ML/HR: 9 INJECTION, SOLUTION INTRAVENOUS at 09:51

## 2025-04-22 ASSESSMENT — PAIN - FUNCTIONAL ASSESSMENT: PAIN_FUNCTIONAL_ASSESSMENT: NONE - DENIES PAIN

## 2025-04-22 NOTE — DISCHARGE INSTRUCTIONS
Percutaneous Liver Biopsy: What to Expect at Home  Your Recovery  A needle biopsy of the liver is a procedure to take a tiny sample (biopsy) of your liver tissue. The tissue sample is looked at under a microscope. Your doctor can look for infection or other liver problems.  You may have some pain where the biopsy needle entered your skin (the puncture site). You may also have pain in your shoulder. This is called referred pain. It is caused by pain traveling along a nerve near the biopsy site. The referred pain usually lasts less than 12 hours. You may have a small amount of bleeding from the puncture site.  You can probably go home if you have no problems after the test. You will need to take it easy at home for 1 or 2 days after the procedure. You will probably be able to return to work and most of your usual activities after that.  This care sheet gives you a general idea about how long it will take for you to recover. But each person recovers at a different pace. Follow the steps below to get better as quickly as possible.  How can you care for yourself at home?  Activity    Rest when you feel tired. Getting enough sleep will help you recover.     Try to walk each day. Start by walking a little more than you did the day before. Bit by bit, increase the amount you walk. Walking boosts blood flow and helps prevent pneumonia and constipation.     Avoid exercises that use your belly muscles and strenuous activities, such as bicycle riding, jogging, weight lifting, or aerobic exercise, for 1 week or until your doctor says it is okay.     Wait 24 hours to drive.     You will probably need to take 1 or 2 days off from work. It depends on the type of work you do and how you feel.     Wait 24 hours to shower.  Pat the puncture site dry. Do not take a bath for at least 7 days after the test, or until your doctor tells you it is okay.   Diet    You can eat your normal diet. If your stomach is upset, try bland, low-fat

## 2025-04-22 NOTE — H&P
Interventional Radiology History and Physical      Patient: Usha Vega 55 y.o. female  [id number]    Consult Requested by:  Dwayne Arias MD    Chief Complaint: LLAMAS    History of Present Illness: 54 yo female with the above cc presents for image-guided liver biopsy.    History:  Past Medical History:   Diagnosis Date    Depression     GERD (gastroesophageal reflux disease)     Hypoglycemia     Insomnia     Migraines     Nicotine vapor product user     6%    Obesity, Class I, BMI 30-34.9     Tinnitus      Family History   Problem Relation Age of Onset    No Known Problems Sister     Hypertension Maternal Grandmother     Asthma Brother     No Known Problems Sister     Drug Abuse Father     Hypertension Mother     Alcohol Abuse Father      Social History     Socioeconomic History    Marital status:      Spouse name: Not on file    Number of children: Not on file    Years of education: Not on file    Highest education level: Not on file   Occupational History    Not on file   Tobacco Use    Smoking status: Former    Smokeless tobacco: Never   Substance and Sexual Activity    Alcohol use: No    Drug use: No    Sexual activity: Not on file   Other Topics Concern    Not on file   Social History Narrative    Not on file     Social Drivers of Health     Financial Resource Strain: Not on file   Food Insecurity: Not on file   Transportation Needs: Not on file   Physical Activity: Not on file   Stress: Not on file   Social Connections: Not on file   Intimate Partner Violence: Not on file   Housing Stability: Not on file       Allergies:   Allergies   Allergen Reactions    Adhesive Tape Dermatitis    Wellbutrin [Bupropion] Other (See Comments)     Pt becomes \"manic\"       Current Medications:  Current Outpatient Medications   Medication Sig    venlafaxine (EFFEXOR XR) 150 MG extended release capsule Take 1 capsule by mouth daily    atorvastatin (LIPITOR) 10 MG tablet Take 1 tablet by mouth daily    celecoxib (CELEBREX)

## 2025-04-22 NOTE — PROGRESS NOTES
0845: Pt arrives ambulatory to angio department accompanied by wife Susanna for US guided liver biopsy procedure. All assessments completed and consent was reviewed.  Education given was regarding procedure, moderate sedation, post-procedure care and  management/follow-up. Opportunity for questions was provided and all questions and concerns were addressed.     1215: Patient given copy of discharge instructions and verbalized understanding.  Patient wheeled to exit via wheelchair. Patient in NAD. No bleeding noted at puncture site.

## 2025-07-15 ENCOUNTER — TRANSCRIBE ORDERS (OUTPATIENT)
Facility: HOSPITAL | Age: 55
End: 2025-07-15

## 2025-07-15 DIAGNOSIS — K75.81 NASH (NONALCOHOLIC STEATOHEPATITIS): Primary | ICD-10-CM

## 2025-07-23 ENCOUNTER — HOSPITAL ENCOUNTER (OUTPATIENT)
Facility: HOSPITAL | Age: 55
Discharge: HOME | End: 2025-07-26
Attending: INTERNAL MEDICINE

## 2025-07-23 VITALS — HEIGHT: 69 IN | WEIGHT: 239 LBS | BODY MASS INDEX: 35.4 KG/M2

## 2025-07-23 DIAGNOSIS — K75.81 NASH (NONALCOHOLIC STEATOHEPATITIS): ICD-10-CM

## 2025-07-23 PROCEDURE — 77080 DXA BONE DENSITY AXIAL: CPT

## (undated) DEVICE — INFECTION CONTROL KIT SYS

## (undated) DEVICE — TOOL 14MH30 LEGEND 14CM 3MM: Brand: MIDAS REX ™

## (undated) DEVICE — 3M™ IOBAN™ 2 ANTIMICROBIAL INCISE DRAPE 6650EZ: Brand: IOBAN™ 2

## (undated) DEVICE — DRAPE,UTILITY,TAPE,15X26,STERILE: Brand: MEDLINE

## (undated) DEVICE — SOLUTION IV 1000ML 0.9% SOD CHL

## (undated) DEVICE — SURGICAL PROCEDURE PACK BASIN MAJ SET CUST NO CAUT

## (undated) DEVICE — CODMAN® SURGICAL PATTIES 3/4" X 3/4" (1.91CM X 1.91CM): Brand: CODMAN®

## (undated) DEVICE — COVER,TABLE,60X90,STERILE: Brand: MEDLINE

## (undated) DEVICE — STRIP,CLOSURE,WOUND,MEDI-STRIP,1/2X4: Brand: MEDLINE

## (undated) DEVICE — STRAP,POSITIONING,KNEE/BODY,FOAM,4X60": Brand: MEDLINE

## (undated) DEVICE — PACK,BASIC,SIRUS,V: Brand: MEDLINE

## (undated) DEVICE — COVER LT HNDL PLAS RIG 1 PER PK

## (undated) DEVICE — 3M™ TEGADERM™ TRANSPARENT FILM DRESSING FRAME STYLE, 1626W, 4 IN X 4-3/4 IN (10 CM X 12 CM), 50/CT 4CT/CASE: Brand: 3M™ TEGADERM™

## (undated) DEVICE — SPONGE: SPECIALTY PEANUT XR 100/CS: Brand: MEDICAL ACTION INDUSTRIES

## (undated) DEVICE — DRAPE,REIN 53X77,STERILE: Brand: MEDLINE

## (undated) DEVICE — BONE WAX WHITE: Brand: BONE WAX WHITE

## (undated) DEVICE — INSULATED BLADE ELECTRODE: Brand: EDGE

## (undated) DEVICE — STERILE POLYISOPRENE POWDER-FREE SURGICAL GLOVES: Brand: PROTEXIS

## (undated) DEVICE — ACCY PA100-A LEGEND LUB/DIFFUSER 4 PACK: Brand: MIDAS REX®

## (undated) DEVICE — SYRINGE MED 20ML STD CLR PLAS LUERLOCK TIP N CTRL DISP

## (undated) DEVICE — DRAIN KT WND 10FR RND 400ML --

## (undated) DEVICE — REM POLYHESIVE ADULT PATIENT RETURN ELECTRODE: Brand: VALLEYLAB

## (undated) DEVICE — DRAPE MICSCP W46XL120IN FOR ZEISS MD FEATURING CLEARLENS

## (undated) DEVICE — SUTURE MCRYL SZ 4-0 L27IN ABSRB UD L19MM PS-2 1/2 CIR PRIM Y426H

## (undated) DEVICE — ROCKER SWITCH PENCIL BLADE ELECTRODE, HOLSTER: Brand: EDGE

## (undated) DEVICE — BIPOLAR FORCEPS CORD: Brand: VALLEYLAB

## (undated) DEVICE — PAD,NON-ADHERENT,3X8,STERILE,LF,1/PK: Brand: MEDLINE

## (undated) DEVICE — DRAPE XR C ARM 41X74IN LF --

## (undated) DEVICE — 3M™ TEGADERM™ TRANSPARENT FILM DRESSING FRAME STYLE, 1624W, 2-3/8 IN X 2-3/4 IN (6 CM X 7 CM), 100/CT 4CT/CASE: Brand: 3M™ TEGADERM™

## (undated) DEVICE — DRAPE,CHEST,FENES,15X10,STERIL: Brand: MEDLINE

## (undated) DEVICE — TAPE,CLOTH/SILK,CURAD,3"X10YD,LF,40/CS: Brand: CURAD

## (undated) DEVICE — STOPCOCK IV 3W --

## (undated) DEVICE — GOWN,SIRUS,NONRNF,SETINSLV,XL,20/CS: Brand: MEDLINE

## (undated) DEVICE — SYR 5ML 1/5 GRAD LL NSAF LF --

## (undated) DEVICE — 3000CC GUARDIAN II: Brand: GUARDIAN

## (undated) DEVICE — MAGNETIC INSTR DRAPE 20X16: Brand: MEDLINE INDUSTRIES, INC.

## (undated) DEVICE — MEDI-VAC NON-CONDUCTIVE SUCTION TUBING: Brand: CARDINAL HEALTH

## (undated) DEVICE — SOLUTION IRRIG 1000ML H2O STRL BLT

## (undated) DEVICE — COVER,MAYO STAND,STERILE: Brand: MEDLINE

## (undated) DEVICE — NDL SPNE QNCKE 18GX3.5IN LF --

## (undated) DEVICE — BIT DRL L12MM DIA2.3MM CERV STP W/ EPOXY RNG DISP PYRENEES

## (undated) DEVICE — SUTURE VCRL SZ 3-0 L27IN ABSRB UD L26MM SH 1/2 CIR J416H

## (undated) DEVICE — PREP CHLORAPREP 10.5 ML ORG --

## (undated) DEVICE — TIP CLEANER: Brand: VALLEYLAB

## (undated) DEVICE — STERILE POLYISOPRENE POWDER-FREE SURGICAL GLOVES WITH EMOLLIENT COATING: Brand: PROTEXIS

## (undated) DEVICE — CATHETER IV 14GA L1.25IN TEF STR HUB INTROCAN SFTY

## (undated) DEVICE — COVER,MAYO STAND,XL,STERILE: Brand: MEDLINE

## (undated) DEVICE — 1010 S-DRAPE TOWEL DRAPE 10/BX: Brand: STERI-DRAPE™